# Patient Record
Sex: FEMALE | Race: BLACK OR AFRICAN AMERICAN | Employment: OTHER | URBAN - METROPOLITAN AREA
[De-identification: names, ages, dates, MRNs, and addresses within clinical notes are randomized per-mention and may not be internally consistent; named-entity substitution may affect disease eponyms.]

---

## 2017-03-01 ENCOUNTER — TELEPHONE (OUTPATIENT)
Dept: INTERNAL MEDICINE CLINIC | Age: 67
End: 2017-03-01

## 2017-03-01 DIAGNOSIS — I10 ESSENTIAL HYPERTENSION, BENIGN: Primary | ICD-10-CM

## 2017-03-01 DIAGNOSIS — E78.5 HYPERLIPIDEMIA, UNSPECIFIED HYPERLIPIDEMIA TYPE: ICD-10-CM

## 2017-03-01 NOTE — TELEPHONE ENCOUNTER
Pt is requesting a Lab slip for labs to be done next door   please call pt when ready for pick      # [phone]

## 2017-03-07 LAB
ALBUMIN SERPL-MCNC: 4.1 G/DL (ref 3.6–4.8)
ALBUMIN/GLOB SERPL: 1.2 {RATIO} (ref 1.1–2.5)
ALP SERPL-CCNC: 88 IU/L (ref 39–117)
ALT SERPL-CCNC: 17 IU/L (ref 0–32)
AST SERPL-CCNC: 20 IU/L (ref 0–40)
BASOPHILS # BLD AUTO: 0 X10E3/UL (ref 0–0.2)
BASOPHILS NFR BLD AUTO: 0 %
BILIRUB SERPL-MCNC: 0.6 MG/DL (ref 0–1.2)
BUN SERPL-MCNC: 20 MG/DL (ref 8–27)
BUN/CREAT SERPL: 20 (ref 11–26)
CALCIUM SERPL-MCNC: 9 MG/DL (ref 8.7–10.3)
CHLORIDE SERPL-SCNC: 100 MMOL/L (ref 96–106)
CHOLEST SERPL-MCNC: 170 MG/DL (ref 100–199)
CO2 SERPL-SCNC: 23 MMOL/L (ref 18–29)
CREAT SERPL-MCNC: 1 MG/DL (ref 0.57–1)
EOSINOPHIL # BLD AUTO: 0.1 X10E3/UL (ref 0–0.4)
EOSINOPHIL NFR BLD AUTO: 2 %
ERYTHROCYTE [DISTWIDTH] IN BLOOD BY AUTOMATED COUNT: 13.8 % (ref 12.3–15.4)
GLOBULIN SER CALC-MCNC: 3.5 G/DL (ref 1.5–4.5)
GLUCOSE SERPL-MCNC: 86 MG/DL (ref 65–99)
HCT VFR BLD AUTO: 37.7 % (ref 34–46.6)
HDLC SERPL-MCNC: 45 MG/DL
HGB BLD-MCNC: 12.4 G/DL (ref 11.1–15.9)
IMM GRANULOCYTES # BLD: 0 X10E3/UL (ref 0–0.1)
IMM GRANULOCYTES NFR BLD: 1 %
INTERPRETATION, 910389: NORMAL
INTERPRETATION: NORMAL
LDLC SERPL CALC-MCNC: 108 MG/DL (ref 0–99)
LYMPHOCYTES # BLD AUTO: 2.9 X10E3/UL (ref 0.7–3.1)
LYMPHOCYTES NFR BLD AUTO: 45 %
MCH RBC QN AUTO: 29.2 PG (ref 26.6–33)
MCHC RBC AUTO-ENTMCNC: 32.9 G/DL (ref 31.5–35.7)
MCV RBC AUTO: 89 FL (ref 79–97)
MONOCYTES # BLD AUTO: 0.5 X10E3/UL (ref 0.1–0.9)
MONOCYTES NFR BLD AUTO: 8 %
NEUTROPHILS # BLD AUTO: 2.8 X10E3/UL (ref 1.4–7)
NEUTROPHILS NFR BLD AUTO: 44 %
PDF IMAGE, 910387: NORMAL
PLATELET # BLD AUTO: 270 X10E3/UL (ref 150–379)
POTASSIUM SERPL-SCNC: 4.2 MMOL/L (ref 3.5–5.2)
PROT SERPL-MCNC: 7.6 G/DL (ref 6–8.5)
RBC # BLD AUTO: 4.24 X10E6/UL (ref 3.77–5.28)
SODIUM SERPL-SCNC: 140 MMOL/L (ref 134–144)
TRIGL SERPL-MCNC: 86 MG/DL (ref 0–149)
VLDLC SERPL CALC-MCNC: 17 MG/DL (ref 5–40)
WBC # BLD AUTO: 6.4 X10E3/UL (ref 3.4–10.8)

## 2017-03-09 NOTE — TELEPHONE ENCOUNTER
Patient called back, Writer informed patient Per Dr Tasneem Pastrana advise a higher dose of atorvastatin, needs a follow up appointment to discuss.  Patient understood and has appointment for March 10th with Dr Tasneem Pastrana

## 2017-03-09 NOTE — TELEPHONE ENCOUNTER
Advise a higher dose of atorvastatin    She needs to make a follow up appointment with me to review this

## 2017-03-10 ENCOUNTER — OFFICE VISIT (OUTPATIENT)
Dept: INTERNAL MEDICINE CLINIC | Age: 67
End: 2017-03-10

## 2017-03-10 VITALS
SYSTOLIC BLOOD PRESSURE: 140 MMHG | BODY MASS INDEX: 31.28 KG/M2 | DIASTOLIC BLOOD PRESSURE: 88 MMHG | WEIGHT: 170 LBS | HEART RATE: 86 BPM | RESPIRATION RATE: 16 BRPM | HEIGHT: 62 IN

## 2017-03-10 DIAGNOSIS — Z12.39 BREAST CANCER SCREENING: ICD-10-CM

## 2017-03-10 DIAGNOSIS — I10 ESSENTIAL HYPERTENSION WITH GOAL BLOOD PRESSURE LESS THAN 130/80: Primary | ICD-10-CM

## 2017-03-10 DIAGNOSIS — I10 ESSENTIAL HYPERTENSION: ICD-10-CM

## 2017-03-10 DIAGNOSIS — Z12.11 COLON CANCER SCREENING: ICD-10-CM

## 2017-03-10 DIAGNOSIS — I63.00 CEREBRAL INFARCTION DUE TO THROMBOSIS OF PRECEREBRAL ARTERY (HCC): ICD-10-CM

## 2017-03-10 DIAGNOSIS — E78.5 DYSLIPIDEMIA, GOAL LDL BELOW 100: ICD-10-CM

## 2017-03-10 RX ORDER — SPIRONOLACTONE 25 MG/1
TABLET ORAL
Qty: 60 TAB | Refills: 5 | Status: SHIPPED | OUTPATIENT
Start: 2017-03-10 | End: 2017-10-17 | Stop reason: SDUPTHER

## 2017-03-10 RX ORDER — AMLODIPINE BESYLATE 10 MG/1
10 TABLET ORAL DAILY
Qty: 30 TAB | Refills: 5 | Status: SHIPPED | OUTPATIENT
Start: 2017-03-10 | End: 2017-10-02 | Stop reason: SDUPTHER

## 2017-03-10 RX ORDER — ATORVASTATIN CALCIUM 20 MG/1
20 TABLET, FILM COATED ORAL DAILY
Qty: 30 TAB | Refills: 11 | Status: SHIPPED | OUTPATIENT
Start: 2017-03-10 | End: 2018-02-21 | Stop reason: SDUPTHER

## 2017-03-10 NOTE — MR AVS SNAPSHOT
Visit Information Date & Time Provider Department Dept. Phone Encounter #  
 3/10/2017  8:30 AM Margot Bahena MD [facility] [phone] Upcoming Health Maintenance Date Due Hepatitis C Screening 1950 ZOSTER VACCINE AGE 60> 5/29/2011 BREAST CANCER SCRN MAMMOGRAM 5/12/2016 GLAUCOMA SCREENING Q2Y 5/29/2016 OSTEOPOROSIS SCREENING (DEXA) 5/29/2016 Pneumococcal 65+ Low/Medium Risk (1 of 2 - PCV13) 5/29/2016 MEDICARE YEARLY EXAM 5/29/2016 INFLUENZA AGE 9 TO ADULT 8/1/2016 COLONOSCOPY 4/28/2020 DTaP/Tdap/Td series (2 - Td) 5/21/2022 Allergies as of 3/10/2017  Review Complete On: 8/1/2016 By: Cheyenne Curry LPN Severity Noted Reaction Type Reactions Ace Inhibitors  06/15/2010    Cough Asa-acetaminophen-salicyl-caff  71/45/8983    Not Reported This Time Motrin [Ibuprofen]  06/15/2010    Hives, Swelling Current Immunizations  Reviewed on 3/10/2017 Name Date TDAP Vaccine 5/21/2012 Reviewed by Margot Bahena MD on 3/10/2017 at  9:02 AM  
You Were Diagnosed With   
  
 Codes Comments Cerebral infarction due to thrombosis of precerebral artery (HCC)    -  Primary ICD-10-CM: I63.00 ICD-9-CM: 433.91 Dyslipidemia, goal LDL below 100     ICD-10-CM: E78.5 ICD-9-CM: 272.4 Essential hypertension     ICD-10-CM: I10 
ICD-9-CM: 401.9 Essential hypertension with goal blood pressure less than 130/80     ICD-10-CM: I10 
ICD-9-CM: 401.9 Colon cancer screening     ICD-10-CM: Z12.11 ICD-9-CM: V76.51 Breast cancer screening     ICD-10-CM: Z12.39 
ICD-9-CM: V76.10 Vitals BP Pulse Resp Height(growth percentile) Weight(growth percentile) BMI  
 140/88 86 16 5' 2\" (1.575 m) 170 lb (77.1 kg) 31.09 kg/m2 OB Status Smoking Status Postmenopausal Never Smoker Vitals History BMI and BSA Data Body Mass Index Body Surface Area 31.09 kg/m 2 1.84 m 2 Preferred Pharmacy Pharmacy Name Phone [facility] [phone] Your Updated Medication List  
  
   
This list is accurate as of: 3/10/17  9:16 AM.  Always use your most recent med list. amLODIPine 10 mg tablet Commonly known as:  Ram Alexis Take 1 Tab by mouth daily. aspirin 81 mg chewable tablet Take 81 mg by mouth daily. atorvastatin 20 mg tablet Commonly known as:  LIPITOR Take 1 Tab by mouth daily. spironolactone 25 mg tablet Commonly known as:  ALDACTONE  
TAKE ONE TABLET BY MOUTH TWICE DAILY Prescriptions Sent to Pharmacy Refills  
 atorvastatin (LIPITOR) 20 mg tablet 11 Sig: Take 1 Tab by mouth daily. Class: Normal  
 Pharmacy: [facility] Ph #: [phone] Route: Oral  
 amLODIPine (NORVASC) 10 mg tablet 5 Sig: Take 1 Tab by mouth daily. Class: Normal  
 [facility] Ph #: [phone] Route: Oral  
 spironolactone (ALDACTONE) 25 mg tablet 5 Sig: TAKE ONE TABLET BY MOUTH TWICE DAILY Class: Normal  
 [facility] Ph #: [phone] To-Do List   
 03/10/2017 Imaging:  [facility] BI SCREENING INCL CAD Introducing [facility]! Rasta Edmond introduces OLSET patient portal. Now you can access parts of your medical record, email your doctor's office, and request medication refills online. 1. In your internet browser, go to [email or web address] 2. Click on the First Time User? Click Here link in the Sign In box. You will see the New Member Sign Up page. 3. Enter your OLSET Access Code exactly as it appears below. You will not need to use this code after youve completed the sign-up process.  If you do not sign up before the expiration date, you must request a new code. · Plurality Access Code: [email or web address] Expires: 6/8/2017  9:16 AM 
 
4. Enter the last four digits of your Social Security Number (xxxx) and Date of Birth (mm/dd/yyyy) as indicated and click Submit. You will be taken to the next sign-up page. 5. Create a Plurality ID. This will be your Plurality login ID and cannot be changed, so think of one that is secure and easy to remember. 6. Create a Plurality password. You can change your password at any time. 7. Enter your Password Reset Question and Answer. This can be used at a later time if you forget your password. 8. Enter your e-mail address. You will receive e-mail notification when new information is available in [address] 9. Click Sign Up. You can now view and download portions of your medical record. 10. Click the Download Summary menu link to download a portable copy of your medical information. If you have questions, please visit the Frequently Asked Questions section of the Plurality website. Remember, Plurality is NOT to be used for urgent needs. For medical emergencies, dial 911. Now available from your iPhone and Android! Please provide this summary of care documentation to your next provider. Your primary care clinician is listed as Kaci Mar. If you have any questions after today's visit, please call [phone].

## 2017-03-10 NOTE — PROGRESS NOTES
Chief Complaint   Patient presents with    Follow-up     Thyroid chnage discussion     Medication Evaluation     pt has been taking simvastatin instead of atorvasatin     Patient Active Problem List    Diagnosis    Cerebral infarction due to thrombosis of precerebral artery (HCC)    Dyslipidemia, goal LDL below 100    CVA (cerebral infarction)     2010   Right thalamus      Hypertension     Non compliant with follow up  Not checking home BP  Hypertension ROS: taking medications as instructed, no medication side effects noted, no TIA's, no chest pain on exertion, no dyspnea on exertion, no swelling of ankles, no palpitations, no muscle aches or pain. New concerns: weight better was off satin for 5 days when had labs  Wt Readings from Last 3 Encounters:   03/10/17 170 lb (77.1 kg)   08/01/16 187 lb (84.8 kg)   12/16/15 184 lb (83.5 kg)     Vitals:    03/10/17 0842   BP: 140/88   Pulse: 86   Resp: 16   Weight: 170 lb (77.1 kg)   Height: 5' 2\" (1.575 m)     BP Readings from Last 3 Encounters:   03/10/17 140/88   08/01/16 144/80   12/16/15 147/85       Lab Results   Component Value Date/Time    Cholesterol, total 170 03/06/2017 12:17 PM    HDL Cholesterol 45 03/06/2017 12:17 PM    LDL, calculated 108 03/06/2017 12:17 PM    VLDL, calculated 17 03/06/2017 12:17 PM    Triglyceride 86 03/06/2017 12:17 PM    CHOL/HDL Ratio 3.6 09/17/2010 10:59 AM     .   Assessment:good ldl control  Plan: lab results and schedule of future lab studies reviewed with patient  repeat labs ordered prior to next appointment  orders and follow up as documented in patient record  reviewed diet, exercise and weight control  recommended sodium restriction. DASH diet advised and information given for lifestyle adjustment  May be able to lower BP 10 points with this diet    . Jose Johnston was seen today for follow-up and medication evaluation.     Diagnoses and all orders for this visit:    Cerebral infarction due to thrombosis of precerebral artery (HCC)    Dyslipidemia, goal LDL below 100  -     atorvastatin (LIPITOR) 20 mg tablet; Take 1 Tab by mouth daily. Essential hypertension    Essential hypertension with goal blood pressure less than 130/80  -     amLODIPine (NORVASC) 10 mg tablet; Take 1 Tab by mouth daily. Other orders  -     spironolactone (ALDACTONE) 25 mg tablet; TAKE ONE TABLET BY MOUTH TWICE DAILY    Exercise program for specific area of concern is given with written instructions  Stable status on multiple high risk medications for multiple comorbidities, will not change any of the present treatment plans  See orders  Switch  Atorvastatin due to drug interaction      Advance Care Planning (ACP) Provider Conversation Snapshot    Date of ACP Conversation: 03/10/17  Persons included in Conversation:  patient  Length of ACP Conversation in minutes:  <16 minutes (Non-Billable)    Authorized Decision Maker (if patient is incapable of making informed decisions):    This person is:   Healthcare Agent/Medical Power of  under Advance Directive          For Patients with Decision Making Capacity:   Values/Goals: Exploration of values, goals, and preferences if recovery is not expected, even with continued medical treatment in the event of:  Imminent death  Severe, permanent brain injury    Conversation Outcomes / Follow-Up Plan:   Reviewed existing Advance Directive

## 2017-03-22 ENCOUNTER — HOSPITAL ENCOUNTER (OUTPATIENT)
Dept: MAMMOGRAPHY | Age: 67
Discharge: HOME | End: 2017-03-22
Attending: INTERNAL MEDICINE
Payer: MEDICARE

## 2017-03-22 DIAGNOSIS — Z12.39 BREAST CANCER SCREENING: ICD-10-CM

## 2017-03-22 PROCEDURE — 77067 SCR MAMMO BI INCL CAD: CPT

## 2017-07-28 ENCOUNTER — OFFICE VISIT (OUTPATIENT)
Dept: INTERNAL MEDICINE CLINIC | Age: 67
End: 2017-07-28

## 2017-07-28 VITALS
OXYGEN SATURATION: 96 % | RESPIRATION RATE: 15 BRPM | BODY MASS INDEX: 34.6 KG/M2 | SYSTOLIC BLOOD PRESSURE: 128 MMHG | DIASTOLIC BLOOD PRESSURE: 80 MMHG | WEIGHT: 188 LBS | TEMPERATURE: 98.1 F | HEIGHT: 62 IN | HEART RATE: 97 BPM

## 2017-07-28 DIAGNOSIS — I10 ESSENTIAL HYPERTENSION: Primary | ICD-10-CM

## 2017-07-28 DIAGNOSIS — Z86.73 HISTORY OF CVA (CEREBROVASCULAR ACCIDENT): ICD-10-CM

## 2017-07-28 DIAGNOSIS — E78.5 DYSLIPIDEMIA, GOAL LDL BELOW 100: ICD-10-CM

## 2017-07-28 NOTE — MR AVS SNAPSHOT
Visit Information Date & Time Provider Department Dept. Phone Encounter #  
 7/28/2017  4:00 PM Bharati Jacome MD [facility] [phone] [id number] Upcoming Health Maintenance Date Due Hepatitis C Screening 1950 ZOSTER VACCINE AGE 60> 3/29/2011 GLAUCOMA SCREENING Q2Y 5/29/2016 OSTEOPOROSIS SCREENING (DEXA) 5/29/2016 Pneumococcal 65+ Low/Medium Risk (1 of 2 - PCV13) 5/29/2016 MEDICARE YEARLY EXAM 5/29/2016 INFLUENZA AGE 9 TO ADULT 8/1/2017 BREAST CANCER SCRN MAMMOGRAM 3/22/2019 COLONOSCOPY 4/28/2020 DTaP/Tdap/Td series (2 - Td) 5/21/2022 Allergies as of 7/28/2017  Review Complete On: 8/1/2016 By: Miracle Sweet LPN Severity Noted Reaction Type Reactions Ace Inhibitors  06/15/2010    Cough Asa-acetaminophen-salicyl-caff  76/51/8113    Not Reported This Time Motrin [Ibuprofen]  06/15/2010    Hives, Swelling Current Immunizations  Reviewed on 3/10/2017 Name Date TDAP Vaccine 5/21/2012 Not reviewed this visit You Were Diagnosed With   
  
 Codes Comments Essential hypertension    -  Primary ICD-10-CM: I10 
ICD-9-CM: 401.9 Dyslipidemia, goal LDL below 100     ICD-10-CM: E78.5 ICD-9-CM: 272.4 History of CVA (cerebrovascular accident)     ICD-10-CM: Z86.73 
ICD-9-CM: V12.54 Vitals BP Pulse Temp Resp Height(growth percentile) Weight(growth percentile) 128/80 97 98.1 °F (36.7 °C) (Oral) 15 5' 2\" (1.575 m) 188 lb (85.3 kg) SpO2 BMI OB Status Smoking Status 96% 34.39 kg/m2 Postmenopausal Never Smoker Vitals History BMI and BSA Data Body Mass Index Body Surface Area  
 34.39 kg/m 2 1.93 m 2 Preferred Pharmacy Pharmacy Name Phone [facility] [phone] Your Updated Medication List  
  
   
This list is accurate as of: 7/28/17  4:30 PM.  Always use your most recent med list.  
 amLODIPine 10 mg tablet Commonly known as:  Haig [place] Take 1 Tab by mouth daily. aspirin 81 mg chewable tablet Take 81 mg by mouth daily. atorvastatin 20 mg tablet Commonly known as:  LIPITOR Take 1 Tab by mouth daily. pneumococcal 13 alan conj dip 0.5 mL Syrg injection Commonly known as:  PREVNAR-13  
0.5 mL by IntraMUSCular route once for 1 dose. spironolactone 25 mg tablet Commonly known as:  ALDACTONE  
TAKE ONE TABLET BY MOUTH TWICE DAILY Prescriptions Printed Refills  
 pneumococcal 13 alan conj dip (PREVNAR-13) 0.5 mL syrg injection 0 Si.5 mL by IntraMUSCular route once for 1 dose. Class: Print Route: IntraMUSCular Please provide this summary of care documentation to your next provider. Your primary care clinician is listed as Sophy Taylor. If you have any questions after today's visit, please call [phone].

## 2017-07-28 NOTE — PROGRESS NOTES
Coordination of Care Questions    1. Have you been to the ER, urgent care clinic since your last visit? No       Hospitalized since your last visit? No    2. Have you seen or consulted any other health care providers outside of the [facility] since your last visit? Include any pap smears or colon screening.  No

## 2017-07-29 NOTE — PROGRESS NOTES
Chief Complaint   Patient presents with    Hypertension    Ankle swelling     Ramírez Andujar is a 78 y.o. female with the following Problems and Medications. Patient Active Problem List   Diagnosis Code    Hypertension I10    CVA (cerebral infarction) I63.9    Dyslipidemia, goal LDL below 100 E78.5    Cerebral infarction due to thrombosis of precerebral artery (HCC) I63.00     Current Outpatient Prescriptions   Medication Sig Dispense Refill    atorvastatin (LIPITOR) 20 mg tablet Take 1 Tab by mouth daily. 30 Tab 11    amLODIPine (NORVASC) 10 mg tablet Take 1 Tab by mouth daily. 30 Tab 5    spironolactone (ALDACTONE) 25 mg tablet TAKE ONE TABLET BY MOUTH TWICE DAILY 60 Tab 5    aspirin 81 mg chewable tablet Take 81 mg by mouth daily.        Denies big issues weight gain  Less active  ankle edema better when feet elevated    Cardiovascular ROS: no chest pain or dyspnea on exertion  Neurological ROS: no TIA or stroke symptoms  General ROS: negative for - chills, fatigue, fever, malaise, night sweats or sleep disturbance  Endocrine ROS: negative for - hair pattern changes, hot flashes, malaise/lethargy, palpitations, polydipsia/polyuria, temperature intolerance or unexpected weight changes      Vitals:    07/28/17 1614 07/28/17 1624   BP: 143/88 128/80   Pulse: 97    Resp: 15    Temp: 98.1 °F (36.7 °C)    TempSrc: Oral    SpO2: 96%    Weight: 188 lb (85.3 kg)    Height: 5' 2\" (1.575 m)      no apparent distress  Wt Readings from Last 3 Encounters:   07/28/17 188 lb (85.3 kg)   03/10/17 170 lb (77.1 kg)   08/01/16 187 lb (84.8 kg)     Chest clear  Rhythm regular  No edema now  Lab Results   Component Value Date/Time    Cholesterol, total 170 03/06/2017 12:17 PM    HDL Cholesterol 45 03/06/2017 12:17 PM    LDL, calculated 108 03/06/2017 12:17 PM    VLDL, calculated 17 03/06/2017 12:17 PM    Triglyceride 86 03/06/2017 12:17 PM    CHOL/HDL Ratio 3.6 09/17/2010 10:59 AM       Lipid abnormality controlled on med dose statin    Hypertension controlled for age based on guidelines      Diagnoses and all orders for this visit:    1. Essential hypertension    2. Dyslipidemia, goal LDL below 100    3. History of CVA (cerebrovascular accident)    Other orders  -     pneumococcal 13 alan conj dip (PREVNAR-13) 0.5 mL syrg injection; 0.5 mL by IntraMUSCular route once for 1 dose.

## 2017-10-02 DIAGNOSIS — I10 ESSENTIAL HYPERTENSION WITH GOAL BLOOD PRESSURE LESS THAN 130/80: ICD-10-CM

## 2017-10-02 RX ORDER — AMLODIPINE BESYLATE 10 MG/1
10 TABLET ORAL DAILY
Qty: 30 TAB | Refills: 5 | Status: SHIPPED | OUTPATIENT
Start: 2017-10-02 | End: 2018-02-21 | Stop reason: SDUPTHER

## 2018-02-21 DIAGNOSIS — E78.5 DYSLIPIDEMIA, GOAL LDL BELOW 100: ICD-10-CM

## 2018-02-21 DIAGNOSIS — I10 ESSENTIAL HYPERTENSION WITH GOAL BLOOD PRESSURE LESS THAN 130/80: ICD-10-CM

## 2018-02-21 RX ORDER — SPIRONOLACTONE 25 MG/1
TABLET ORAL
Qty: 60 TAB | Refills: 2 | Status: SHIPPED | OUTPATIENT
Start: 2018-02-21 | End: 2018-06-11 | Stop reason: SDUPTHER

## 2018-02-21 RX ORDER — ATORVASTATIN CALCIUM 20 MG/1
20 TABLET, FILM COATED ORAL DAILY
Qty: 30 TAB | Refills: 11 | Status: SHIPPED | OUTPATIENT
Start: 2018-02-21 | End: 2019-03-08 | Stop reason: SDUPTHER

## 2018-02-21 RX ORDER — AMLODIPINE BESYLATE 10 MG/1
10 TABLET ORAL DAILY
Qty: 30 TAB | Refills: 5 | Status: SHIPPED | OUTPATIENT
Start: 2018-02-21 | End: 2018-05-17 | Stop reason: SDUPTHER

## 2018-02-21 NOTE — TELEPHONE ENCOUNTER
Patient is calling to refill medication amlodipine, atorvastatin and spironolactone.  Questions please call [phone] or [phone]

## 2018-05-17 ENCOUNTER — OFFICE VISIT (OUTPATIENT)
Dept: INTERNAL MEDICINE CLINIC | Age: 68
End: 2018-05-17

## 2018-05-17 VITALS
HEIGHT: 62 IN | HEART RATE: 77 BPM | OXYGEN SATURATION: 98 % | DIASTOLIC BLOOD PRESSURE: 75 MMHG | BODY MASS INDEX: 34.3 KG/M2 | SYSTOLIC BLOOD PRESSURE: 132 MMHG | WEIGHT: 186.4 LBS | TEMPERATURE: 97.9 F | RESPIRATION RATE: 18 BRPM

## 2018-05-17 DIAGNOSIS — H10.12 ACUTE ATOPIC CONJUNCTIVITIS OF LEFT EYE: ICD-10-CM

## 2018-05-17 DIAGNOSIS — Z00.00 MEDICARE ANNUAL WELLNESS VISIT, SUBSEQUENT: ICD-10-CM

## 2018-05-17 DIAGNOSIS — E78.5 DYSLIPIDEMIA, GOAL LDL BELOW 100: ICD-10-CM

## 2018-05-17 DIAGNOSIS — M17.11 PRIMARY OSTEOARTHRITIS OF RIGHT KNEE: ICD-10-CM

## 2018-05-17 DIAGNOSIS — I10 ESSENTIAL HYPERTENSION: Primary | ICD-10-CM

## 2018-05-17 DIAGNOSIS — I10 ESSENTIAL HYPERTENSION WITH GOAL BLOOD PRESSURE LESS THAN 130/80: ICD-10-CM

## 2018-05-17 PROBLEM — M17.10 PRIMARY OSTEOARTHRITIS OF KNEE: Status: ACTIVE | Noted: 2018-05-17

## 2018-05-17 RX ORDER — AMLODIPINE BESYLATE 10 MG/1
10 TABLET ORAL DAILY
Qty: 30 TAB | Refills: 5 | Status: SHIPPED | OUTPATIENT
Start: 2018-05-17 | End: 2019-05-28 | Stop reason: SDUPTHER

## 2018-05-17 NOTE — PROGRESS NOTES
Johnathan Mead is a 78 y.o. female    Chief Complaint   Patient presents with    Hypertension     follow up    Cholesterol Problem     follow up    Ankle swelling     for last 2 weeks in right ankle swells when sitting goes away when gets up and walks around    Knee Pain     right knee     1. Have you been to the ER, urgent care clinic since your last visit? Hospitalized since your last visit? Went to [facility]. for pink eye 3 weeks ago    2. Have you seen or consulted any other health care providers outside of the [address] [place] [address] since your last visit? Include any pap smears or colon screening.  no    Visit Vitals    /70 (BP 1 Location: Right arm, BP Patient Position: Sitting)    Pulse 77    Temp 97.9 °F (36.6 °C) (Oral)    Resp 18    Ht 5' 2\" (1.575 m)    Wt 186 lb 6.4 oz (84.6 kg)    SpO2 98%    BMI 34.09 kg/m2

## 2018-05-17 NOTE — PROGRESS NOTES
Chief Complaint   Patient presents with    Hypertension     follow up    Cholesterol Problem     follow up    Ankle swelling     for last 2 weeks in right ankle swells when sitting goes away when gets up and walks around    Knee Pain     right knee     SUBJECTIVE: Shaun Yan is a 78 y.o. female seen for a follow up visit; she has hypertension, hyperlipidemia and history of prior stroke. Current Outpatient Prescriptions   Medication Sig Dispense Refill    amLODIPine (NORVASC) 10 mg tablet Take 1 Tab by mouth daily. 30 Tab 5    atorvastatin (LIPITOR) 20 mg tablet Take 1 Tab by mouth daily. 30 Tab 11    spironolactone (ALDACTONE) 25 mg tablet TAKE ONE TABLET BY MOUTH TWICE DAILY 60 Tab 2    aspirin 81 mg chewable tablet Take 81 mg by mouth daily. Patient Active Problem List   Diagnosis Code    Hypertension I10    CVA (cerebral infarction) I63.9    Dyslipidemia, goal LDL below 100 E78.5    Cerebral infarction due to thrombosis of precerebral artery (HCC) I63.00    Primary osteoarthritis of knee M17.10     System Review: Cardiovascular ROS - taking medications as instructed, no medication side effects noted, no TIA's, no chest pain on exertion, no dyspnea on exertion, no swelling of ankles, CNS ROS - no TIA or stroke-like symptoms, no amaurosis, diplopia, abnormal speech, unilateral numbness or weakness. New concerns: knee pain unilateral.     OBJECTIVE:  Visit Vitals    /75    Pulse 77    Temp 97.9 °F (36.6 °C) (Oral)    Resp 18    Ht 5' 2\" (1.575 m)    Wt 186 lb 6.4 oz (84.6 kg)    SpO2 98%    BMI 34.09 kg/m2      Appearance: alert, well appearing, and in no distress and oriented to person, place, and time. General exam: CVS exam BP noted to be well controlled today in office, S1, S2 normal, no gallop, no murmur, chest clear, no JVD, no HSM, no edema. Knee rom reduced  Lab review: orders written for new lab studies as appropriate; see orders.      ASSESSMENT:  hypertension stable, hyperlipidemia stable, cerebrovascular disease stable, continue asa. Knee DJD likely use tylenol and aspercream    PLAN:  current treatment plan is effective, no change in therapy. This is the Subsequent Medicare Annual Wellness Exam, performed 12 months or more after the Initial AWV or the last Subsequent AWV    I have reviewed the patient's medical history in detail and updated the computerized patient record. History     Past Medical History:   Diagnosis Date    Hypertension 6/15/2010      Past Surgical History:   Procedure Laterality Date    HX BREAST BIOPSY  1990's    neg     Current Outpatient Prescriptions   Medication Sig Dispense Refill    amLODIPine (NORVASC) 10 mg tablet Take 1 Tab by mouth daily. 30 Tab 5    atorvastatin (LIPITOR) 20 mg tablet Take 1 Tab by mouth daily. 30 Tab 11    spironolactone (ALDACTONE) 25 mg tablet TAKE ONE TABLET BY MOUTH TWICE DAILY 60 Tab 2    aspirin 81 mg chewable tablet Take 81 mg by mouth daily.        Allergies   Allergen Reactions    Ace Inhibitors Cough    Asa-Acetaminophen-Salicyl-Caff Not Reported This Time    Motrin [Ibuprofen] Hives and Swelling     Family History   Problem Relation Age of Onset    Heart Disease Mother     Diabetes Mother     Hypertension Mother     Breast Cancer Maternal Aunt      Social History   Substance Use Topics    Smoking status: Never Smoker    Smokeless tobacco: Never Used    Alcohol use No     Patient Active Problem List   Diagnosis Code    Hypertension I10    CVA (cerebral infarction) I63.9    Dyslipidemia, goal LDL below 100 E78.5    Cerebral infarction due to thrombosis of precerebral artery (HCC) I63.00    Primary osteoarthritis of knee M17.10       Depression Risk Factor Screening:     PHQ over the last two weeks 7/28/2017   Little interest or pleasure in doing things Not at all   Feeling down, depressed or hopeless Not at all   Total Score PHQ 2 0     Alcohol Risk Factor Screening: Functional Ability and Level of Safety:   Hearing Loss  Hearing is good. Activities of Daily Living  The home contains: no safety equipment. Patient does total self care    Fall Risk  Fall Risk Assessment, last 12 mths 7/28/2017   Able to walk? Yes   Fall in past 12 months? No       Abuse Screen  Patient is not abused    Cognitive Screening   Evaluation of Cognitive Function:  Has your family/caregiver stated any concerns about your memory: no  Normal    Patient Care Team   Patient Care Team:  Rojelio Ch MD as PCP - General    Assessment/Plan   Education and counseling provided:  Are appropriate based on today's review and evaluation    Diagnoses and all orders for this visit:    1. Essential hypertension  -     CBC WITH AUTOMATED DIFF  -     LIPID PANEL  -     METABOLIC PANEL, COMPREHENSIVE    2. Dyslipidemia, goal LDL below 100  -     CBC WITH AUTOMATED DIFF  -     LIPID PANEL  -     METABOLIC PANEL, COMPREHENSIVE    3. Acute atopic conjunctivitis of left eye    4. Primary osteoarthritis of right knee  -     XR KNEE RT MAX 2 VWS; Future    5. Essential hypertension with goal blood pressure less than 130/80  -     amLODIPine (NORVASC) 10 mg tablet; Take 1 Tab by mouth daily. 6. Medicare annual wellness visit, subsequent        Health Maintenance Due   Topic Date Due    Hepatitis C Screening  1950    ZOSTER VACCINE AGE 60>  03/29/2011    GLAUCOMA SCREENING Q2Y  05/29/2016    Bone Densitometry (Dexa) Screening  05/29/2016    Pneumococcal 65+ Low/Medium Risk (1 of 2 - PCV13) 05/29/2016    MEDICARE YEARLY EXAM  03/28/2018     I have recommended that this patient have a flu shot and any vaccine but she declines at this time. I have discussed the risks and benefits of this examination with her. The patient verbalizes understanding.

## 2018-05-17 NOTE — PATIENT INSTRUCTIONS
Medicare Wellness Visit, Female    The best way to live healthy is to have a lifestyle where you eat a well-balanced diet, exercise regularly, limit alcohol use, and quit all forms of tobacco/nicotine, if applicable. Regular preventive services are another way to keep healthy. Preventive services (vaccines, screening tests, monitoring & exams) can help personalize your care plan, which helps you manage your own care. Screening tests can find health problems at the earliest stages, when they are easiest to treat. [address] [facility] follows the current, evidence-based guidelines published by the [facility] Cristiano Stuart ([facility]) when recommending preventive services for our patients. Because we follow these guidelines, sometimes recommendations change over time as research supports it. (For example, mammograms used to be recommended annually. Even though Medicare will still pay for an annual mammogram, the newer guidelines recommend a mammogram every two years for women of average risk.)    Of course, you and your provider may decide to screen more often for some diseases, based on your risk and co-morbidities (chronic disease you are already diagnosed with). Preventive services for you include:    - Medicare offers their members a free annual wellness visit, which is time for you and your primary care provider to discuss and plan for your preventive service needs. Take advantage of this benefit every year!    -All people over age 72 should receive the recommended pneumonia vaccines. Current USPSTF guidelines recommend a series of two vaccines for the best pneumonia protection.     -All adults should have a yearly flu vaccine and a tetanus vaccine every 10 years. All adults age 61 years should receive a shingles vaccine once in their lifetime.      -A bone mass density test is recommended when a woman turns 65 to screen for osteoporosis.  This test is only recommended once as a screening. Some providers will use this same test as a disease monitoring tool if you already have osteoporosis. -All adults age 38-68 years who are overweight should have a diabetes screening test once every three years.     -Other screening tests & preventive services for persons with diabetes include: an eye exam to screen for diabetic retinopathy, a kidney function test, a foot exam, and stricter control over your cholesterol.     -Cardiovascular screening for adults with routine risk involves an electrocardiogram (ECG) at intervals determined by the provider.     -Colorectal cancer screenings should be done for adults age 54-65 years with normal risk. There are a number of acceptable methods of screening for this type of cancer. Each test has its own benefits and drawbacks. Discuss with your provider what is most appropriate for you during your annual wellness visit. The different tests include: colonoscopy (considered the best screening method), a fecal occult blood test, a fecal DNA test, and sigmoidoscopy. -Breast cancer screenings are recommended every other year for women of normal risk age 54-69 years.     -Cervical cancer screenings for women over age 72 are only recommended with certain risk factors.     -All adults born between [facility] should be screened once for Hepatitis C.      Here is a list of your current Health Maintenance items (your personalized list of preventive services) with a due date:  Health Maintenance Due   Topic Date Due    Hepatitis C Test  1950    Shingles Vaccine  03/29/2011    Glaucoma Screening   05/29/2016    Bone Mineral Density   05/29/2016    Pneumococcal Vaccine (1 of 2 - PCV13) 05/29/2016    Annual Well Visit  03/28/2018

## 2018-07-04 LAB
ALBUMIN SERPL-MCNC: 3.9 G/DL (ref 3.6–4.8)
ALBUMIN/GLOB SERPL: 1.3 {RATIO} (ref 1.2–2.2)
ALP SERPL-CCNC: 97 IU/L (ref 39–117)
ALT SERPL-CCNC: 16 IU/L (ref 0–32)
AST SERPL-CCNC: 14 IU/L (ref 0–40)
BASOPHILS # BLD AUTO: 0 X10E3/UL (ref 0–0.2)
BASOPHILS NFR BLD AUTO: 0 %
BILIRUB SERPL-MCNC: 0.4 MG/DL (ref 0–1.2)
BUN SERPL-MCNC: 19 MG/DL (ref 8–27)
BUN/CREAT SERPL: 17 (ref 12–28)
CALCIUM SERPL-MCNC: 9.3 MG/DL (ref 8.7–10.3)
CHLORIDE SERPL-SCNC: 101 MMOL/L (ref 96–106)
CHOLEST SERPL-MCNC: 144 MG/DL (ref 100–199)
CO2 SERPL-SCNC: 25 MMOL/L (ref 20–29)
CREAT SERPL-MCNC: 1.13 MG/DL (ref 0.57–1)
EOSINOPHIL # BLD AUTO: 0.1 X10E3/UL (ref 0–0.4)
EOSINOPHIL NFR BLD AUTO: 2 %
ERYTHROCYTE [DISTWIDTH] IN BLOOD BY AUTOMATED COUNT: 13.5 % (ref 12.3–15.4)
GLOBULIN SER CALC-MCNC: 3 G/DL (ref 1.5–4.5)
GLUCOSE SERPL-MCNC: 88 MG/DL (ref 65–99)
HCT VFR BLD AUTO: 38.4 % (ref 34–46.6)
HDLC SERPL-MCNC: 47 MG/DL
HGB BLD-MCNC: 12.4 G/DL (ref 11.1–15.9)
IMM GRANULOCYTES # BLD: 0 X10E3/UL (ref 0–0.1)
IMM GRANULOCYTES NFR BLD: 0 %
INTERPRETATION, 910389: NORMAL
INTERPRETATION: NORMAL
LDLC SERPL CALC-MCNC: 82 MG/DL (ref 0–99)
LYMPHOCYTES # BLD AUTO: 2.6 X10E3/UL (ref 0.7–3.1)
LYMPHOCYTES NFR BLD AUTO: 36 %
MCH RBC QN AUTO: 29.5 PG (ref 26.6–33)
MCHC RBC AUTO-ENTMCNC: 32.3 G/DL (ref 31.5–35.7)
MCV RBC AUTO: 91 FL (ref 79–97)
MONOCYTES # BLD AUTO: 0.7 X10E3/UL (ref 0.1–0.9)
MONOCYTES NFR BLD AUTO: 10 %
NEUTROPHILS # BLD AUTO: 3.9 X10E3/UL (ref 1.4–7)
NEUTROPHILS NFR BLD AUTO: 52 %
PDF IMAGE, 910387: NORMAL
PLATELET # BLD AUTO: 285 X10E3/UL (ref 150–379)
POTASSIUM SERPL-SCNC: 4.7 MMOL/L (ref 3.5–5.2)
PROT SERPL-MCNC: 6.9 G/DL (ref 6–8.5)
RBC # BLD AUTO: 4.21 X10E6/UL (ref 3.77–5.28)
SODIUM SERPL-SCNC: 139 MMOL/L (ref 134–144)
TRIGL SERPL-MCNC: 77 MG/DL (ref 0–149)
VLDLC SERPL CALC-MCNC: 15 MG/DL (ref 5–40)
WBC # BLD AUTO: 7.3 X10E3/UL (ref 3.4–10.8)

## 2018-09-04 ENCOUNTER — TELEPHONE (OUTPATIENT)
Dept: INTERNAL MEDICINE CLINIC | Age: 68
End: 2018-09-04

## 2018-09-04 DIAGNOSIS — M17.0 PRIMARY OSTEOARTHRITIS OF BOTH KNEES: Primary | ICD-10-CM

## 2018-09-04 NOTE — TELEPHONE ENCOUNTER
Spoke with patient. Advised per Dr Dipak Carlson that ordered plain xray not an MRI of her knee   added an order for both knee xray.  Patient verbalized understanding

## 2018-09-04 NOTE — TELEPHONE ENCOUNTER
Patient is requesting to add the left leg to the MRI order,since both legs are in pain. She is waiting

## 2018-09-05 ENCOUNTER — HOSPITAL ENCOUNTER (OUTPATIENT)
Dept: GENERAL RADIOLOGY | Age: 68
Discharge: HOME | End: 2018-09-05
Attending: INTERNAL MEDICINE
Payer: MEDICARE

## 2018-09-05 DIAGNOSIS — M17.0 PRIMARY OSTEOARTHRITIS OF BOTH KNEES: ICD-10-CM

## 2018-09-05 PROCEDURE — 73565 X-RAY EXAM OF KNEES: CPT

## 2018-09-10 ENCOUNTER — TELEPHONE (OUTPATIENT)
Dept: INTERNAL MEDICINE CLINIC | Age: 68
End: 2018-09-10

## 2018-09-10 NOTE — PROGRESS NOTES
Called pt and gave her the results. She expressed understanding and had no further questions. Gave her Dr Martita Arriola's name/number as well.

## 2018-09-10 NOTE — TELEPHONE ENCOUNTER
[id number]  Dr Gladys Estrada office need a copy of xray she did not know if it could be picked or if it was another way to get it to the office. So need by 9/11/2018 1:30pm appt.

## 2018-09-10 NOTE — TELEPHONE ENCOUNTER
Called pt back. Dr Natalie Gallegos has priviledges at [facility] and performs surgeries in their operating rooms. He should be able to access these records. If there are any issues she will reach out to the  [facility] she went to as we have no way of downloading her xrays or making copies of film for her.

## 2019-03-08 DIAGNOSIS — E78.5 DYSLIPIDEMIA, GOAL LDL BELOW 100: ICD-10-CM

## 2019-03-08 RX ORDER — ATORVASTATIN CALCIUM 20 MG/1
TABLET, FILM COATED ORAL
Qty: 30 TAB | Refills: 0 | Status: SHIPPED | OUTPATIENT
Start: 2019-03-08 | End: 2019-05-28 | Stop reason: SDUPTHER

## 2019-05-28 DIAGNOSIS — I10 ESSENTIAL HYPERTENSION WITH GOAL BLOOD PRESSURE LESS THAN 130/80: ICD-10-CM

## 2019-05-28 DIAGNOSIS — E78.5 DYSLIPIDEMIA, GOAL LDL BELOW 100: ICD-10-CM

## 2019-05-28 NOTE — TELEPHONE ENCOUNTER
Pt requesting refills of \"Atorvastatin 20mg\" and \"Amlodipine 10mg\"   Pt stated she only has 4 of her \"Amlodipine 10mg\" left and completely out of \"Atorvastatin\"   Pt stated she is out of refills   PT rescheduled appt set for 05/30 as she had a recent family passing. Appt rescheduled. Monday, Lisa 10, 2019 04:30   PTs pharmacy is [facility] on file.       [place]

## 2019-05-29 DIAGNOSIS — I10 ESSENTIAL HYPERTENSION WITH GOAL BLOOD PRESSURE LESS THAN 130/80: ICD-10-CM

## 2019-05-29 DIAGNOSIS — E78.5 DYSLIPIDEMIA, GOAL LDL BELOW 100: ICD-10-CM

## 2019-05-29 RX ORDER — AMLODIPINE BESYLATE 10 MG/1
10 TABLET ORAL DAILY
Qty: 30 TAB | Refills: 0 | Status: SHIPPED | OUTPATIENT
Start: 2019-05-29 | End: 2019-05-29 | Stop reason: SDUPTHER

## 2019-05-29 RX ORDER — AMLODIPINE BESYLATE 10 MG/1
TABLET ORAL
Qty: 30 TAB | Refills: 0 | Status: SHIPPED | OUTPATIENT
Start: 2019-05-29 | End: 2019-06-10 | Stop reason: SDUPTHER

## 2019-05-29 RX ORDER — ATORVASTATIN CALCIUM 20 MG/1
TABLET, FILM COATED ORAL
Qty: 30 TAB | Refills: 0 | Status: SHIPPED | OUTPATIENT
Start: 2019-05-29 | End: 2019-06-10 | Stop reason: SDUPTHER

## 2019-05-29 RX ORDER — ATORVASTATIN CALCIUM 20 MG/1
TABLET, FILM COATED ORAL
Qty: 30 TAB | Refills: 0 | Status: SHIPPED | OUTPATIENT
Start: 2019-05-29 | End: 2019-05-29 | Stop reason: SDUPTHER

## 2019-06-10 ENCOUNTER — OFFICE VISIT (OUTPATIENT)
Dept: INTERNAL MEDICINE CLINIC | Age: 69
End: 2019-06-10

## 2019-06-10 VITALS
BODY MASS INDEX: 34.04 KG/M2 | HEART RATE: 73 BPM | DIASTOLIC BLOOD PRESSURE: 78 MMHG | HEIGHT: 62 IN | RESPIRATION RATE: 18 BRPM | TEMPERATURE: 97 F | SYSTOLIC BLOOD PRESSURE: 120 MMHG | OXYGEN SATURATION: 99 % | WEIGHT: 185 LBS

## 2019-06-10 DIAGNOSIS — I10 ESSENTIAL HYPERTENSION WITH GOAL BLOOD PRESSURE LESS THAN 130/80: ICD-10-CM

## 2019-06-10 DIAGNOSIS — Z86.73 HISTORY OF CVA (CEREBROVASCULAR ACCIDENT): ICD-10-CM

## 2019-06-10 DIAGNOSIS — E78.5 DYSLIPIDEMIA, GOAL LDL BELOW 100: ICD-10-CM

## 2019-06-10 DIAGNOSIS — I10 ESSENTIAL HYPERTENSION: Primary | ICD-10-CM

## 2019-06-10 DIAGNOSIS — M17.0 PRIMARY OSTEOARTHRITIS OF BOTH KNEES: ICD-10-CM

## 2019-06-10 DIAGNOSIS — I73.9 PAD (PERIPHERAL ARTERY DISEASE) (HCC): ICD-10-CM

## 2019-06-10 RX ORDER — ATORVASTATIN CALCIUM 20 MG/1
20 TABLET, FILM COATED ORAL DAILY
Qty: 30 TAB | Refills: 5 | Status: SHIPPED | OUTPATIENT
Start: 2019-06-10 | End: 2020-01-09 | Stop reason: SDUPTHER

## 2019-06-10 RX ORDER — SPIRONOLACTONE 25 MG/1
TABLET ORAL
Qty: 60 TAB | Refills: 5 | Status: SHIPPED | OUTPATIENT
Start: 2019-06-10 | End: 2020-01-07 | Stop reason: SDUPTHER

## 2019-06-10 RX ORDER — AMLODIPINE BESYLATE 10 MG/1
TABLET ORAL
Qty: 30 TAB | Refills: 5 | Status: SHIPPED | OUTPATIENT
Start: 2019-06-10 | End: 2020-01-07 | Stop reason: SDUPTHER

## 2019-06-10 NOTE — PROGRESS NOTES
1. Have you been to the ER, urgent care clinic since your last visit? Hospitalized since your last visit?no    2. Have you seen or consulted any other health care providers outside of the [address] [place] [address] since your last visit? Include any pap smears or colon screening.  Vascular-dr MCKINNEY

## 2019-06-10 NOTE — PROGRESS NOTES
Chief Complaint   Patient presents with    Hypertension    Cerebrovascular Accident     hx    Cholesterol Problem    Arthritis     Chief Complaint   Patient presents with    Hypertension    Cerebrovascular Accident     hx    Cholesterol Problem    Arthritis     SUBJECTIVE: Sam Moran is a 77 y.o. female seen for a follow up visit; she has hypertension, hyperlipidemia and history of prior stroke. Current Outpatient Medications   Medication Sig Dispense Refill    spironolactone (ALDACTONE) 25 mg tablet TAKE ONE TABLET BY MOUTH TWICE DAILY 60 Tab 5    amLODIPine (NORVASC) 10 mg tablet TAKE ONE TABLET BY MOUTH EVERY DAY 30 Tab 5    atorvastatin (LIPITOR) 20 mg tablet Take 1 Tab by mouth daily. 30 Tab 5    aspirin 81 mg chewable tablet Take 81 mg by mouth daily. Patient Active Problem List   Diagnosis Code    Hypertension I10    CVA (cerebral infarction) I63.9    Dyslipidemia, goal LDL below 100 E78.5    Cerebral infarction due to thrombosis of precerebral artery ([facility]) I63.00    Primary osteoarthritis of knee M17.10    PAD (peripheral artery disease) ([facility]) I73.9    Primary osteoarthritis of both knees M17.0     System Review: Cardiovascular ROS - taking medications as instructed, no medication side effects noted, no TIA's, no chest pain on exertion, no dyspnea on exertion, no swelling of ankles, CNS ROS - no TIA or stroke-like symptoms, no amaurosis, diplopia, abnormal speech, unilateral numbness or weakness. New concerns: knee pain unilateral. Told severe DJD has morning stiffness    OBJECTIVE:  Visit Vitals  /78   Pulse 73   Temp 97 °F (36.1 °C) (Oral)   Resp 18   Ht 5' 2\" (1.575 m)   Wt 185 lb (83.9 kg)   SpO2 99%   BMI 33.84 kg/m²      Appearance: alert, well appearing, and in no distress and oriented to person, place, and time.   General exam: CVS exam BP noted to be well controlled today in office, S1, S2 normal, no gallop, no murmur, chest clear, no JVD, no HSM, no edema.  Knee exam: bilateral positive for moderate crepitations, some mild tenderness and pain on range of motion, nolarge effusion is present, no pseudo laxity noted. Knee rom reduced    Gait normal  Non focal neyuro  Lab review: orders written for new lab studies as appropriate; see orders. ASSESSMENT:  hypertension stable, hyperlipidemia stable, cerebrovascular disease stable, continue asa. Knee DJD likely use tylenol and aspercream    PLAN:  current treatment plan is effective, no change in therapy. Diagnoses and all orders for this visit:    1. Essential hypertension  -     CBC WITH AUTOMATED DIFF  -     LIPID PANEL  -     METABOLIC PANEL, COMPREHENSIVE    2. PAD (peripheral artery disease) ([facility]    3. Primary osteoarthritis of both knees    4. Dyslipidemia, goal LDL below 100  -     atorvastatin (LIPITOR) 20 mg tablet; Take 1 Tab by mouth daily. 5. History of CVA (cerebrovascular accident)    6.  Essential hypertension with goal blood pressure less than 130/80  -     amLODIPine (NORVASC) 10 mg tablet; TAKE ONE TABLET BY MOUTH EVERY DAY    Other orders  -     spironolactone (ALDACTONE) 25 mg tablet; TAKE ONE TABLET BY MOUTH TWICE DAILY      High risk medications reviewed  Prolonged visit with 15 minutes of time out  of more than a  25 minute visit spent counseling patient and family and formulation of care

## 2019-11-13 LAB
CREATININE, EXTERNAL: 1.27
INR, EXTERNAL: 1
PT, EXTERNAL: 11.5

## 2019-11-14 ENCOUNTER — OFFICE VISIT (OUTPATIENT)
Dept: INTERNAL MEDICINE CLINIC | Age: 69
End: 2019-11-14

## 2019-11-14 VITALS
HEART RATE: 88 BPM | BODY MASS INDEX: 34.96 KG/M2 | HEIGHT: 62 IN | DIASTOLIC BLOOD PRESSURE: 75 MMHG | SYSTOLIC BLOOD PRESSURE: 126 MMHG | OXYGEN SATURATION: 99 % | TEMPERATURE: 97.3 F | RESPIRATION RATE: 18 BRPM | WEIGHT: 190 LBS

## 2019-11-14 DIAGNOSIS — H81.13 BENIGN POSITIONAL VERTIGO, BILATERAL: Primary | ICD-10-CM

## 2019-11-14 DIAGNOSIS — I10 ESSENTIAL HYPERTENSION: ICD-10-CM

## 2019-11-14 DIAGNOSIS — E78.5 DYSLIPIDEMIA, GOAL LDL BELOW 100: ICD-10-CM

## 2019-11-14 NOTE — PROGRESS NOTES
1. Have you been to the ER, urgent care clinic since your last visit? Hospitalized since your last visit? ER dizzy and vomiting @ [facility].  EKG was normal, MRI-normal    2. Have you seen or consulted any other health care providers outside of the [address] [place] [address] since your last visit? Include any pap smears or colon screening.  No

## 2019-11-14 NOTE — PROGRESS NOTES
Problem follow up: Demarco Stuart returns for follow up visit regarding er visit. she was seen 2 days ago in  er diagnosed with vertigo or  and treated with antinausea and meclizine. Workup was significant for neg ct lab and ekg. Notes, labs, studies, imaging related to this problem during prior visit were not available . Since that visit, she has significantly improved. she has been compliant with prescribed treatment. Residual symptoms include: none  New issues associated with this problem include: very weak from antinausea    Subjective:  Ms. Orlin Carr labs were reviewed from the hospital ER. Her creatinine was 1.27, total protein 8.8, white count 11.65. The rest of her lab work was normal.  Patient will need to have that all repeated. She never had the lab work done that I gave her in May. Patient Active Problem List    Diagnosis Date Noted    [facility]peripheral artery disease) [facility] 06/10/2019    Primary osteoarthritis of both knees 06/10/2019    Primary osteoarthritis of knee 05/17/2018    [facility] to [facility]cerebral [facility] 12/16/2015    Dyslipidemia, goal LDL below 100 04/28/2011    CVA (cerebral infarction) 07/09/2010    Hypertension 06/15/2010     Vitals:    11/14/19 1610   BP: 142/78   Pulse: 88   Resp: 18   Temp: 97.3 °F (36.3 °C)   TempSrc: Oral   SpO2: 99%   Weight: 190 lb (86.2 kg)   Height: 5' 2\" (1.575 m)     . Vitals:    11/14/19 1610 11/14/19 1633   BP: 142/78 126/75   Pulse: 88    Resp: 18    Temp: 97.3 °F (36.3 °C)    TempSrc: Oral    SpO2: 99%    Weight: 190 lb (86.2 kg)    Height: 5' 2\" (1.575 m)      aileen  S1 and S2 normal, no murmurs, clicks, gallops or rubs. Regular rate and rhythm. Chest is clear; no wheezes or rales. No edema or JVD. The abdomen is soft without tenderness, guarding, mass, rebound or organomegaly. Bowel sounds are normal. No CVA tenderness or inguinal adenopathy noted.       1. Benign positional vertigo, bilateral  Likely advise slow return normal  ./elevate HOB    2. Dyslipidemia, goal LDL below 100  labs  - LIPID PANEL; Future    3. Essential hypertension  Repeat labs  - CBC WITH AUTOMATED DIFF; Future  - LIPID PANEL; Future  - METABOLIC PANEL, COMPREHENSIVE;  Future

## 2020-01-07 DIAGNOSIS — I10 ESSENTIAL HYPERTENSION WITH GOAL BLOOD PRESSURE LESS THAN 130/80: ICD-10-CM

## 2020-01-08 RX ORDER — SPIRONOLACTONE 25 MG/1
TABLET ORAL
Qty: 60 TAB | Refills: 5 | Status: SHIPPED | OUTPATIENT
Start: 2020-01-08 | End: 2020-07-08 | Stop reason: SDUPTHER

## 2020-01-08 RX ORDER — AMLODIPINE BESYLATE 10 MG/1
TABLET ORAL
Qty: 30 TAB | Refills: 5 | Status: SHIPPED | OUTPATIENT
Start: 2020-01-08 | End: 2020-07-08 | Stop reason: SDUPTHER

## 2020-01-09 DIAGNOSIS — E78.5 DYSLIPIDEMIA, GOAL LDL BELOW 100: ICD-10-CM

## 2020-01-09 RX ORDER — ATORVASTATIN CALCIUM 20 MG/1
20 TABLET, FILM COATED ORAL DAILY
Qty: 30 TAB | Refills: 5 | Status: SHIPPED | OUTPATIENT
Start: 2020-01-09 | End: 2020-07-08 | Stop reason: SDUPTHER

## 2020-02-25 ENCOUNTER — TELEPHONE (OUTPATIENT)
Dept: PHARMACY | Age: 70
End: 2020-02-25

## 2020-02-25 NOTE — TELEPHONE ENCOUNTER
CLINICAL PHARMACY: STATIN THERAPY REVIEW    Identified care gap per [employer] statin use in persons with cardiovascular disease and adherence     Per [facility]: [phone]    Medication: Atorvastatin 20 mg tablet  Last 3 fill dates: 1/15/2020  Day supply: 90  Refills remainin  Directions: 1 tab po daily  Insurance billed: [facility]  Prescribing Provider: Savanna Dolan MD      No patient outreach planned at this time. Patient ha 90 day supply delivered and has 1 refill remaining. Last Appt: 20    Next Appt: [facility]  Department, toll free: [phone], option 7    CLINICAL PHARMACY CONSULT: MED RECONCILIATION/REVIEW ADDENDUM    For Pharmacy Admin Tracking Only    PHSO: PHSO Patient?: Yes  Total # of Interventions Recommended: Count: 1    - Maintenance Safety Lab Monitoring #: 1  Recommended intervention potential cost savings:   Accepted intervention potential cost savings:    Total Interventions Accepted: 1  Time Spent (min): [facility]

## 2020-07-08 ENCOUNTER — VIRTUAL VISIT (OUTPATIENT)
Dept: INTERNAL MEDICINE CLINIC | Age: 70
End: 2020-07-08

## 2020-07-08 DIAGNOSIS — E78.5 DYSLIPIDEMIA, GOAL LDL BELOW 100: ICD-10-CM

## 2020-07-08 DIAGNOSIS — I10 ESSENTIAL HYPERTENSION WITH GOAL BLOOD PRESSURE LESS THAN 130/80: ICD-10-CM

## 2020-07-08 DIAGNOSIS — Z12.11 COLON CANCER SCREENING: ICD-10-CM

## 2020-07-08 DIAGNOSIS — I73.9 PAD (PERIPHERAL ARTERY DISEASE) (HCC): ICD-10-CM

## 2020-07-08 DIAGNOSIS — Z78.0 POSTMENOPAUSAL STATE: ICD-10-CM

## 2020-07-08 DIAGNOSIS — Z12.31 ENCOUNTER FOR SCREENING MAMMOGRAM FOR MALIGNANT NEOPLASM OF BREAST: ICD-10-CM

## 2020-07-08 DIAGNOSIS — I10 ESSENTIAL HYPERTENSION: Primary | ICD-10-CM

## 2020-07-08 RX ORDER — SPIRONOLACTONE 25 MG/1
TABLET ORAL
Qty: 180 TAB | Refills: 1 | Status: SHIPPED | OUTPATIENT
Start: 2020-07-08 | End: 2020-11-12

## 2020-07-08 RX ORDER — ATORVASTATIN CALCIUM 20 MG/1
20 TABLET, FILM COATED ORAL DAILY
Qty: 90 TAB | Refills: 1 | Status: SHIPPED | OUTPATIENT
Start: 2020-07-08 | End: 2020-11-12

## 2020-07-08 RX ORDER — AMLODIPINE BESYLATE 10 MG/1
TABLET ORAL
Qty: 90 TAB | Refills: 1 | Status: SHIPPED | OUTPATIENT
Start: 2020-07-08 | End: 2020-11-12

## 2020-07-08 NOTE — PATIENT INSTRUCTIONS
Medicare Wellness Visit, Female The best way to live healthy is to have a lifestyle where you eat a well-balanced diet, exercise regularly, limit alcohol use, and quit all forms of tobacco/nicotine, if applicable. Regular preventive services are another way to keep healthy. Preventive services (vaccines, screening tests, monitoring & exams) can help personalize your care plan, which helps you manage your own care. Screening tests can find health problems at the earliest stages, when they are easiest to treat. [facility] follows the current, evidence-based guidelines published by the [facility] Cristiano Davidson ([facility]) when recommending preventive services for our patients. Because we follow these guidelines, sometimes recommendations change over time as research supports it. (For example, mammograms used to be recommended annually. Even though Medicare will still pay for an annual mammogram, the newer guidelines recommend a mammogram every two years for women of average risk). Of course, you and your doctor may decide to screen more often for some diseases, based on your risk and your co-morbidities (chronic disease you are already diagnosed with). Preventive services for you include: - Medicare offers their members a free annual wellness visit, which is time for you and your primary care provider to discuss and plan for your preventive service needs. Take advantage of this benefit every year! 
-All adults over the age of 72 should receive the recommended pneumonia vaccines. Current USPSTF guidelines recommend a series of two vaccines for the best pneumonia protection.  
-All adults should have a flu vaccine yearly and a tetanus vaccine every 10 years.  
-All adults age 48 and older should receive the shingles vaccines (series of two vaccines). -All adults age 38-68 who are overweight should have a diabetes screening test once every three years. -All adults born between [birth year] and 1964 should be screened once for Hepatitis C. 
-Other screening tests and preventive services for persons with diabetes include: an eye exam to screen for diabetic retinopathy, a kidney function test, a foot exam, and stricter control over your cholesterol.  
-Cardiovascular screening for adults with routine risk involves an electrocardiogram (ECG) at intervals determined by your doctor.  
-Colorectal cancer screenings should be done for adults age 54-65 with no increased risk factors for colorectal cancer. There are a number of acceptable methods of screening for this type of cancer. Each test has its own benefits and drawbacks. Discuss with your doctor what is most appropriate for you during your annual wellness visit. The different tests include: colonoscopy (considered the best screening method), a fecal occult blood test, a fecal DNA test, and sigmoidoscopy. 
 
-A bone mass density test is recommended when a woman turns 65 to screen for osteoporosis. This test is only recommended one time, as a screening. Some providers will use this same test as a disease monitoring tool if you already have osteoporosis. -Breast cancer screenings are recommended every other year for women of normal risk, age 54-69. 
-Cervical cancer screenings for women over age 72 are only recommended with certain risk factors. Here is a list of your current Health Maintenance items (your personalized list of preventive services) with a due date: 
Health Maintenance Due Topic Date Due  
 Hepatitis C Test  1950  Shingles Vaccine (1 of 2) 05/29/2001  Glaucoma Screening   05/29/2016  Bone Mineral Density   05/29/2016  Pneumococcal Vaccine (1 of 1 - PPSV23) 05/29/2016  Mammogram  03/22/2019 [facility] Annual Well Visit  05/18/2019  Cholesterol Test   07/03/2019  Colonoscopy  04/28/2020

## 2020-07-08 NOTE — PROGRESS NOTES
I was at home while conducting this encounter. Consent:  She and/or her healthcare decision maker is aware that this patient-initiated Telehealth encounter is a billable service, with coverage as determined by her insurance carrier. She is aware that she may receive a bill and has provided verbal consent to proceed: Yes    This virtual visit was conducted telephone encounter only. -  I affirm this is a Patient Initiated Episode with an Established Patient who has not had a related appointment within my department in the past 7 days or scheduled within the next 24 hours. Note: this encounter is not billable if this call serves to triage the patient into an appointment for the relevant concern. Total Time: 35 to 40 min    26 minutes on health issues and 15 on wellness  This is the Subsequent Medicare Annual Wellness Exam, performed 12 months or more after the Initial AWV or the last Subsequent AWV    I have reviewed the patient's medical history in detail and updated the computerized patient record. History     Patient Active Problem List   Diagnosis Code    Hypertension I10    CVA (cerebral infarction) I63.9    Dyslipidemia, goal LDL below 100 E78.5    Cerebral infarction due to thrombosis of precerebral artery ([facility]) I63.00    Primary osteoarthritis of knee M17.10    PAD (peripheral artery disease) ([facility]) I73.9    Primary osteoarthritis of both knees M17.0     Past Medical History:   Diagnosis Date    Hypertension 6/15/2010      Past Surgical History:   Procedure Laterality Date    HX BREAST BIOPSY  1990's    neg     Current Outpatient Medications   Medication Sig Dispense Refill    atorvastatin (LIPITOR) 20 mg tablet Take 1 Tab by mouth daily. 30 Tab 5    amLODIPine (NORVASC) 10 mg tablet TAKE ONE TABLET BY MOUTH EVERY DAY 30 Tab 5    spironolactone (ALDACTONE) 25 mg tablet TAKE ONE TABLET BY MOUTH TWICE DAILY 60 Tab 5    aspirin 81 mg chewable tablet Take 81 mg by mouth daily. Allergies   Allergen Reactions    Ace Inhibitors Cough    Asa-Acetaminophen-Salicyl-Caff Not Reported This Time    Motrin [Ibuprofen] Hives and Swelling       Family History   Problem Relation Age of Onset    Heart Disease Mother     Diabetes Mother     Hypertension Mother     Breast Cancer Maternal Aunt      Social History     Tobacco Use    Smoking status: Never Smoker    Smokeless tobacco: Never Used   Substance Use Topics    Alcohol use: No       Depression Risk Factor Screening:     3 most recent PHQ Screens 7/8/2020   Little interest or pleasure in doing things Not at all   Feeling down, depressed, irritable, or hopeless Not at all   Total Score PHQ 2 0       Alcohol Risk Factor Screening:   Do you average 1 drink per night or more than 7 drinks a week:  No    On any one occasion in the past three months have you have had more than 3 drinks containing alcohol:  No      Functional Ability and Level of Safety:   Hearing: Hearing is good. Activities of Daily Living: The home contains: no safety equipment. Patient does total self care     Ambulation: with mild difficulty     Fall Risk:  Fall Risk Assessment, last 12 mths 7/8/2020   Able to walk? Yes   Fall in past 12 months? No     Abuse Screen:  Patient is not abused       Cognitive Screening   Has your family/caregiver stated any concerns about your memory: no    Cognitive Screening: Normal - Verbal Fluency Test    Patient Care Team   Patient Care Team:  Mil Conklin MD as PCP - General  Mil Conklin MD as PCP - Gifty Huerta Provider    Assessment/Plan   Education and counseling provided:  Are appropriate based on today's review and evaluation    Diagnoses and all orders for this visit:    1. PAD (peripheral artery disease) ([facility] [address]    2. Encounter for screening mammogram for malignant neoplasm of breast  -     LUCIANO MAMMO BI SCREENING INCL CAD; Future    3.  Postmenopausal state  -     DEXA BONE DENSITY STUDY AXIAL; Future        Health Maintenance Due   Topic Date Due    Hepatitis C Screening  1950    Shingrix Vaccine Age 50> (1 of 2) 05/29/2001    GLAUCOMA SCREENING Q2Y  05/29/2016    Bone Densitometry (Dexa) Screening  05/29/2016    Pneumococcal 65+ years (1 of 1 - PPSV23) 05/29/2016    Breast Cancer Screen Mammogram  03/22/2019    Medicare Yearly Exam  05/18/2019    Lipid Screen  07/03/2019    Colonoscopy  04/28/2020       Kelsi Kiser, who was evaluated through a synchronous (real-time) audio only encounter, and/or her healthcare decision maker, is aware that it is a billable service, with coverage as determined by her insurance carrier. She provided verbal consent to proceed: Yes, and patient identification was verified. It was conducted pursuant to the emergency declaration under the [address] [place] [address], [address] [place] [address] authority and the [employer] Act. A caregiver was present when appropriate. Ability to conduct physical exam was limited. I was at home. The patient was at home   Chief Complaint   Patient presents with    Medication Refill     Chief Complaint   Patient presents with    Medication Refill     SUBJECTIVE: Kelsi Kiser is a 72 y.o. female seen for a follow up visit; she has hypertension, hyperlipidemia and history of prior stroke. Current Outpatient Medications   Medication Sig Dispense Refill    atorvastatin (LIPITOR) 20 mg tablet Take 1 Tab by mouth daily. 90 Tab 1    amLODIPine (NORVASC) 10 mg tablet TAKE ONE TABLET BY MOUTH EVERY DAY 90 Tab 1    spironolactone (ALDACTONE) 25 mg tablet TAKE ONE TABLET BY MOUTH TWICE DAILY 180 Tab 1    aspirin 81 mg chewable tablet Take 81 mg by mouth daily.        Patient Active Problem List   Diagnosis Code    Hypertension I10    CVA (cerebral infarction) I63.9    Dyslipidemia, goal LDL below 100 E78.5    Cerebral infarction due to thrombosis of precerebral artery ([facility]) I63.00    Primary osteoarthritis of knee M17.10    PAD (peripheral artery disease) ([facility]) I73.9    Primary osteoarthritis of both knees M17.0     System Review: Cardiovascular ROS - taking medications as instructed, no medication side effects noted, no TIA's, no chest pain on exertion, no dyspnea on exertion, no swelling of ankles, CNS ROS - no TIA or stroke-like symptoms, no amaurosis, diplopia, abnormal speech, unilateral numbness or weakness. New concerns:knees worse in cold weather    OBJECTIVE:   Sounds well fluent  There were no vitals taken for this visit. homr BP reviewed        Lab review: orders written for new lab studies as appropriate; see orders. ASSESSMENT:  hypertension stable, hyperlipidemia stable, cerebrovascular disease stable, continue asa. Knee DJD likely use tylenol and aspercream    PLAN:  current treatment plan is effective, no change in therapy. Diagnoses and all orders for this visit:    1. Essential hypertension  -     CBC WITH AUTOMATED DIFF; Future  -     METABOLIC PANEL, COMPREHENSIVE; Future  -     URIC ACID; Future  -     MAGNESIUM; Future    2. PAD (peripheral artery disease) ([facility] [address]    3. Encounter for screening mammogram for malignant neoplasm of breast  -     [facility] MAMMO BI SCREENING INCL CAD; Future    4. Postmenopausal state  -     DEXA BONE DENSITY STUDY AXIAL; Future    5. Dyslipidemia, goal LDL below 100  -     LIPID PANEL; Future  -     atorvastatin (LIPITOR) 20 mg tablet; Take 1 Tab by mouth daily. 6. Colon cancer screening  -     COLOGUARD TEST (FECAL DNA COLORECTAL CANCER SCREENING)    7.  Essential hypertension with goal blood pressure less than 130/80  -     amLODIPine (NORVASC) 10 mg tablet; TAKE ONE TABLET BY MOUTH EVERY DAY    Other orders  -     spironolactone (ALDACTONE) 25 mg tablet; TAKE ONE TABLET BY MOUTH TWICE DAILY      High risk medications reviewed  Prolonged visit with 15 minutes of time out  of more than a  25 minute visit spent counseling patient and family and formulation of care    Requested Prescriptions     Signed Prescriptions Disp Refills    atorvastatin (LIPITOR) 20 mg tablet 90 Tab 1     Sig: Take 1 Tab by mouth daily.  amLODIPine (NORVASC) 10 mg tablet 90 Tab 1     Sig: TAKE ONE TABLET BY MOUTH EVERY DAY    spironolactone (ALDACTONE) 25 mg tablet 180 Tab 1     Sig: TAKE ONE TABLET BY MOUTH TWICE DAILY     Vaccines discussed  .     Dodie Cazares MD

## 2020-07-08 NOTE — PROGRESS NOTES
1. Have you been to the ER, urgent care clinic since your last visit? Hospitalized since your last visit? No    2. Have you seen or consulted any other health care providers outside of the [address] [place] [address] since your last visit? Include any pap smears or colon screening.  No   Chief Complaint   Patient presents with    Medication Refill

## 2020-07-15 ENCOUNTER — HOSPITAL ENCOUNTER (OUTPATIENT)
Dept: LAB | Age: 70
Discharge: HOME | End: 2020-07-15

## 2020-07-15 DIAGNOSIS — I10 ESSENTIAL HYPERTENSION: ICD-10-CM

## 2020-07-15 DIAGNOSIS — E78.5 DYSLIPIDEMIA, GOAL LDL BELOW 100: ICD-10-CM

## 2020-07-15 LAB
ALBUMIN SERPL-MCNC: 3.6 G/DL (ref 3.5–5)
ALBUMIN/GLOB SERPL: 0.9 {RATIO} (ref 1.1–2.2)
ALP SERPL-CCNC: 113 U/L (ref 45–117)
ALT SERPL-CCNC: 25 U/L (ref 12–78)
ANION GAP SERPL CALC-SCNC: 6 MMOL/L (ref 5–15)
AST SERPL-CCNC: 17 U/L (ref 15–37)
BASOPHILS # BLD: 0 K/UL (ref 0–0.1)
BASOPHILS NFR BLD: 0 % (ref 0–1)
BILIRUB SERPL-MCNC: 0.5 MG/DL (ref 0.2–1)
BUN SERPL-MCNC: 17 MG/DL (ref 6–20)
BUN/CREAT SERPL: 16 (ref 12–20)
CALCIUM SERPL-MCNC: 8.6 MG/DL (ref 8.5–10.1)
CHLORIDE SERPL-SCNC: 105 MMOL/L (ref 97–108)
CHOLEST SERPL-MCNC: 149 MG/DL
CO2 SERPL-SCNC: 25 MMOL/L (ref 21–32)
CREAT SERPL-MCNC: 1.09 MG/DL (ref 0.55–1.02)
DIFFERENTIAL METHOD BLD: NORMAL
EOSINOPHIL # BLD: 0.1 K/UL (ref 0–0.4)
EOSINOPHIL NFR BLD: 1 % (ref 0–7)
ERYTHROCYTE [DISTWIDTH] IN BLOOD BY AUTOMATED COUNT: 13.1 % (ref 11.5–14.5)
GLOBULIN SER CALC-MCNC: 4.2 G/DL (ref 2–4)
GLUCOSE SERPL-MCNC: 88 MG/DL (ref 65–100)
HCT VFR BLD AUTO: 40.8 % (ref 35–47)
HDLC SERPL-MCNC: 45 MG/DL
HDLC SERPL: 3.3 {RATIO} (ref 0–5)
HGB BLD-MCNC: 13.4 G/DL (ref 11.5–16)
IMM GRANULOCYTES # BLD AUTO: 0 K/UL (ref 0–0.04)
IMM GRANULOCYTES NFR BLD AUTO: 0 % (ref 0–0.5)
LDLC SERPL CALC-MCNC: 86.8 MG/DL (ref 0–100)
LIPID PROFILE,FLP: NORMAL
LYMPHOCYTES # BLD: 3 K/UL (ref 0.8–3.5)
LYMPHOCYTES NFR BLD: 36 % (ref 12–49)
MAGNESIUM SERPL-MCNC: 2.3 MG/DL (ref 1.6–2.4)
MCH RBC QN AUTO: 30 PG (ref 26–34)
MCHC RBC AUTO-ENTMCNC: 32.8 G/DL (ref 30–36.5)
MCV RBC AUTO: 91.3 FL (ref 80–99)
MONOCYTES # BLD: 0.9 K/UL (ref 0–1)
MONOCYTES NFR BLD: 11 % (ref 5–13)
NEUTS SEG # BLD: 4.4 K/UL (ref 1.8–8)
NEUTS SEG NFR BLD: 52 % (ref 32–75)
NRBC # BLD: 0 K/UL (ref 0–0.01)
NRBC BLD-RTO: 0 PER 100 WBC
PLATELET # BLD AUTO: 292 K/UL (ref 150–400)
PMV BLD AUTO: 10 FL (ref 8.9–12.9)
POTASSIUM SERPL-SCNC: 3.9 MMOL/L (ref 3.5–5.1)
PROT SERPL-MCNC: 7.8 G/DL (ref 6.4–8.2)
RBC # BLD AUTO: 4.47 M/UL (ref 3.8–5.2)
SODIUM SERPL-SCNC: 136 MMOL/L (ref 136–145)
TRIGL SERPL-MCNC: 86 MG/DL (ref ?–150)
URATE SERPL-MCNC: 6.2 MG/DL (ref 2.6–6)
VLDLC SERPL CALC-MCNC: 17.2 MG/DL
WBC # BLD AUTO: 8.4 K/UL (ref 3.6–11)

## 2020-07-17 ENCOUNTER — HOSPITAL ENCOUNTER (OUTPATIENT)
Dept: MAMMOGRAPHY | Age: 70
Discharge: HOME | End: 2020-07-17
Attending: INTERNAL MEDICINE
Payer: MEDICARE

## 2020-07-17 DIAGNOSIS — Z12.31 ENCOUNTER FOR SCREENING MAMMOGRAM FOR MALIGNANT NEOPLASM OF BREAST: ICD-10-CM

## 2020-07-17 DIAGNOSIS — Z78.0 POSTMENOPAUSAL STATE: ICD-10-CM

## 2020-07-17 PROCEDURE — 77080 DXA BONE DENSITY AXIAL: CPT

## 2020-07-17 PROCEDURE — 77067 SCR MAMMO BI INCL CAD: CPT

## 2021-03-30 DIAGNOSIS — I10 ESSENTIAL HYPERTENSION WITH GOAL BLOOD PRESSURE LESS THAN 130/80: ICD-10-CM

## 2021-03-30 DIAGNOSIS — E78.5 DYSLIPIDEMIA, GOAL LDL BELOW 100: ICD-10-CM

## 2021-03-30 RX ORDER — SPIRONOLACTONE 25 MG/1
TABLET ORAL
Qty: 180 TAB | Refills: 3 | OUTPATIENT
Start: 2021-03-30

## 2021-03-30 RX ORDER — AMLODIPINE BESYLATE 10 MG/1
TABLET ORAL
Qty: 90 TAB | Refills: 3 | OUTPATIENT
Start: 2021-03-30

## 2021-03-30 RX ORDER — ATORVASTATIN CALCIUM 20 MG/1
TABLET, FILM COATED ORAL
Qty: 90 TAB | Refills: 3 | OUTPATIENT
Start: 2021-03-30

## 2021-05-03 ENCOUNTER — OFFICE VISIT (OUTPATIENT)
Dept: INTERNAL MEDICINE CLINIC | Age: 71
End: 2021-05-03
Payer: MEDICARE

## 2021-05-03 VITALS
RESPIRATION RATE: 16 BRPM | HEIGHT: 62 IN | TEMPERATURE: 97.4 F | DIASTOLIC BLOOD PRESSURE: 77 MMHG | HEART RATE: 76 BPM | SYSTOLIC BLOOD PRESSURE: 133 MMHG | BODY MASS INDEX: 34.34 KG/M2 | OXYGEN SATURATION: 98 % | WEIGHT: 186.6 LBS

## 2021-05-03 DIAGNOSIS — I10 ESSENTIAL HYPERTENSION: Primary | ICD-10-CM

## 2021-05-03 DIAGNOSIS — I10 ESSENTIAL HYPERTENSION WITH GOAL BLOOD PRESSURE LESS THAN 130/80: ICD-10-CM

## 2021-05-03 DIAGNOSIS — I73.9 PAD (PERIPHERAL ARTERY DISEASE) (HCC): ICD-10-CM

## 2021-05-03 DIAGNOSIS — E78.5 DYSLIPIDEMIA, GOAL LDL BELOW 100: ICD-10-CM

## 2021-05-03 PROBLEM — I87.9 VEIN DISORDER: Status: ACTIVE | Noted: 2021-05-03

## 2021-05-03 PROCEDURE — G8432 DEP SCR NOT DOC, RNG: HCPCS | Performed by: INTERNAL MEDICINE

## 2021-05-03 PROCEDURE — G9899 SCRN MAM PERF RSLTS DOC: HCPCS | Performed by: INTERNAL MEDICINE

## 2021-05-03 PROCEDURE — G8427 DOCREV CUR MEDS BY ELIG CLIN: HCPCS | Performed by: INTERNAL MEDICINE

## 2021-05-03 PROCEDURE — 3017F COLORECTAL CA SCREEN DOC REV: CPT | Performed by: INTERNAL MEDICINE

## 2021-05-03 PROCEDURE — 99214 OFFICE O/P EST MOD 30 MIN: CPT | Performed by: INTERNAL MEDICINE

## 2021-05-03 PROCEDURE — G8752 SYS BP LESS 140: HCPCS | Performed by: INTERNAL MEDICINE

## 2021-05-03 PROCEDURE — 1090F PRES/ABSN URINE INCON ASSESS: CPT | Performed by: INTERNAL MEDICINE

## 2021-05-03 PROCEDURE — G8419 CALC BMI OUT NRM PARAM NOF/U: HCPCS | Performed by: INTERNAL MEDICINE

## 2021-05-03 PROCEDURE — G8754 DIAS BP LESS 90: HCPCS | Performed by: INTERNAL MEDICINE

## 2021-05-03 PROCEDURE — G8399 PT W/DXA RESULTS DOCUMENT: HCPCS | Performed by: INTERNAL MEDICINE

## 2021-05-03 PROCEDURE — G8536 NO DOC ELDER MAL SCRN: HCPCS | Performed by: INTERNAL MEDICINE

## 2021-05-03 PROCEDURE — 1101F PT FALLS ASSESS-DOCD LE1/YR: CPT | Performed by: INTERNAL MEDICINE

## 2021-05-03 RX ORDER — AMLODIPINE BESYLATE 10 MG/1
TABLET ORAL
Qty: 90 TAB | Refills: 1 | Status: SHIPPED | OUTPATIENT
Start: 2021-05-03 | End: 2021-12-06

## 2021-05-03 RX ORDER — SPIRONOLACTONE 25 MG/1
TABLET ORAL
Qty: 180 TAB | Refills: 1 | Status: SHIPPED | OUTPATIENT
Start: 2021-05-03 | End: 2022-02-01

## 2021-05-03 RX ORDER — ATORVASTATIN CALCIUM 20 MG/1
TABLET, FILM COATED ORAL
Qty: 90 TAB | Refills: 1 | Status: SHIPPED | OUTPATIENT
Start: 2021-05-03 | End: 2022-02-01

## 2021-05-03 NOTE — PROGRESS NOTES
1. Have you been to the ER, urgent care clinic since your last visit? Hospitalized since your last visit? No    2. Have you seen or consulted any other health care providers outside of the [address] [place] [address] since your last visit? Include any pap smears or colon screening.  No   Chief Complaint   Patient presents with    Blood Pressure Check    Cerebrovascular Accident    Osteoarthritis    Gallbladder Attack    Medication Refill

## 2021-05-04 NOTE — PROGRESS NOTES
Chief Complaint   Patient presents with    Blood Pressure Check    Cerebrovascular Accident    Osteoarthritis    Gallbladder Attack    Medication Refill     Chief Complaint   Patient presents with    Blood Pressure Check    Cerebrovascular Accident    Osteoarthritis    Gallbladder Attack    Medication Refill   old stroke no 11 years ago  SUBJECTIVE: Abimael Mendieta is a 72 y.o. female seen for a follow up visit; she has hypertension, hyperlipidemia and history of prior stroke. Current Outpatient Medications   Medication Sig Dispense Refill    atorvastatin (LIPITOR) 20 mg tablet TAKE 1 TABLET BY MOUTH  DAILY 90 Tab 1    spironolactone (ALDACTONE) 25 mg tablet TAKE 1 TABLET BY MOUTH  TWICE DAILY 180 Tab 1    amLODIPine (NORVASC) 10 mg tablet TAKE 1 TABLET BY MOUTH  DAILY 90 Tab 1    aspirin 81 mg chewable tablet Take 81 mg by mouth daily. 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   New concerns: knee pain unilateral. Told severe DJD has morning stiffness   Tolerates it so far    OBJECTIVE:  Visit Vitals  /77 (BP 1 Location: Left upper arm, BP Patient Position: Sitting, BP Cuff Size: Adult)   Pulse 76   Temp 97.4 °F (36.3 °C) (Temporal)   Resp 16   Ht 5' 2\" (1.575 m)   Wt 186 lb 9.6 oz (84.6 kg)   SpO2 98%   BMI 34.13 kg/m²      Appearance: alert, well appearing, and in no distress and oriented to person, place, and time. General exam: CVS exam BP noted to be well controlled today in office, S1, S2 normal, no gallop, no murmur, chest clear, no JVD, no HSM, no edema. Knee exam: bilateral positive for moderate crepitations, some mild tenderness and pain on range of motion, nolarge effusion is present, no pseudo laxity noted. Knee rom reduced    Gait normal  Non focal neyuro  Lab review: orders written for new lab studies as appropriate; see orders. ASSESSMENT:  hypertension stable, hyperlipidemia stable, cerebrovascular disease stable, continue asa. Knee DJD likely use tylenol and aspercream    PLAN:  current treatment plan is effective, no change in therapy. Diagnoses and all orders for this visit:    1. Essential hypertension  -     CBC WITH AUTOMATED DIFF; Future  -     METABOLIC PANEL, COMPREHENSIVE; Future  -     LIPID PANEL; Future  -     MAGNESIUM; Future    2. PAD (peripheral artery disease) ([facility] [address]    3.  Dyslipidemia, goal LDL below 100  -     atorvastatin (LIPITOR) 20 mg tablet; TAKE 1 TABLET BY MOUTH  DAILY    4. Essential hypertension with goal blood pressure less than 130/80  -     amLODIPine (NORVASC) 10 mg tablet; TAKE 1 TABLET BY MOUTH  DAILY    Other orders  -     spironolactone (ALDACTONE) 25 mg tablet; TAKE 1 TABLET BY MOUTH  TWICE DAILY      High risk medications reviewed  Prolonged visit with 15 minutes of time out  of more than a  25 minute visit spent counseling patient and family and formulation of care    Requested Prescriptions     Signed Prescriptions Disp Refills    atorvastatin (LIPITOR) 20 mg tablet 90 Tab 1     Sig: TAKE 1 TABLET BY MOUTH  DAILY    spironolactone (ALDACTONE) 25 mg tablet 180 Tab 1     Sig: TAKE 1 TABLET BY MOUTH  TWICE DAILY    amLODIPine (NORVASC) 10 mg tablet 90 Tab 1     Sig: TAKE 1 TABLET BY MOUTH  DAILY

## 2021-11-15 ENCOUNTER — TELEPHONE (OUTPATIENT)
Dept: PHARMACY | Age: 71
End: 2021-11-15

## 2021-11-15 NOTE — TELEPHONE ENCOUNTER
CLINICAL PHARMACY: ADHERENCE REVIEW  Identified care gap per Maru; fills at [facility]: Statin adherence    Last Visit: 5/3/21    Patient not found in Outcomes MTM    [address]    Per Insurance Records through 11/6/21 (YTD [place] = 81%; Potential Fail Date: 11/19/21): Atorvastatin 20mg daily last filled on 6/1/21 for 90 day supply. Next refill due: 9/26/21    Per chart, reordered 5/3/21 for #90, 1 refill    Lab Results   Component Value Date/Time    Cholesterol, total 149 07/15/2020 08:00 AM    HDL Cholesterol 45 07/15/2020 08:00 AM    LDL, calculated 86.8 07/15/2020 08:00 AM    VLDL, calculated 17.2 07/15/2020 08:00 AM    Triglyceride 86 07/15/2020 08:00 AM    CHOL/HDL Ratio 3.3 07/15/2020 08:00 AM     ALT (SGPT)   Date Value Ref Range Status   07/15/2020 25 12 - 78 U/L Final     AST (SGOT)   Date Value Ref Range Status   07/15/2020 17 15 - 37 U/L Final     The ASCVD Risk score (Stephani Castillots., et al., 2013) failed to calculate for the following reasons: The patient has a prior MI or stroke diagnosis     PLAN  The following are interventions that have been identified:  - Patient overdue refilling atorvastatin and active on home medication list - should have refill/s left on rx at [facility]    Attempting to reach patient to review.  Left message asking for return call. No future appointments.     Marquis Stephenson, PharmD, [address] [facility], toll free: [phone], option 2

## 2021-11-17 NOTE — TELEPHONE ENCOUNTER
Reached patient to review. Reports she plans to reorder her rxs today from [facility], including atorvastatin. Unclear how she sets up or organizes her medications, but states she got ahead on fills/supply.  Denies missing doses or ADRs.    =========================================================   For Pharmacy [facility] [address] in place: No   Recommendation Provided To: Patient/Caregiver: 1 via Telephone   Intervention Detail: Adherence Monitorin   Gap Closed?: Yes   Intervention Accepted By: Patient/Caregiver: 1   Time Spent (min): 10

## 2021-12-06 DIAGNOSIS — I10 ESSENTIAL HYPERTENSION WITH GOAL BLOOD PRESSURE LESS THAN 130/80: ICD-10-CM

## 2021-12-06 RX ORDER — AMLODIPINE BESYLATE 10 MG/1
TABLET ORAL
Qty: 90 TABLET | Refills: 3 | Status: SHIPPED | OUTPATIENT
Start: 2021-12-06 | End: 2022-04-27 | Stop reason: SDUPTHER

## 2022-01-26 LAB — SARS-COV-2, NAA: POSITIVE

## 2022-03-18 PROBLEM — M17.10 PRIMARY OSTEOARTHRITIS OF KNEE: Status: ACTIVE | Noted: 2018-05-17

## 2022-03-18 PROBLEM — I73.9 PAD (PERIPHERAL ARTERY DISEASE) (HCC): Status: ACTIVE | Noted: 2019-06-10

## 2022-03-18 PROBLEM — M17.0 PRIMARY OSTEOARTHRITIS OF BOTH KNEES: Status: ACTIVE | Noted: 2019-06-10

## 2022-03-19 PROBLEM — I87.9 VEIN DISORDER: Status: ACTIVE | Noted: 2021-05-03

## 2022-04-27 ENCOUNTER — OFFICE VISIT (OUTPATIENT)
Dept: INTERNAL MEDICINE CLINIC | Age: 72
End: 2022-04-27
Payer: MEDICARE

## 2022-04-27 VITALS
SYSTOLIC BLOOD PRESSURE: 121 MMHG | OXYGEN SATURATION: 98 % | WEIGHT: 186.2 LBS | RESPIRATION RATE: 17 BRPM | HEART RATE: 68 BPM | TEMPERATURE: 98.1 F | BODY MASS INDEX: 34.06 KG/M2 | DIASTOLIC BLOOD PRESSURE: 70 MMHG

## 2022-04-27 DIAGNOSIS — Z12.31 ENCOUNTER FOR SCREENING MAMMOGRAM FOR MALIGNANT NEOPLASM OF BREAST: ICD-10-CM

## 2022-04-27 DIAGNOSIS — I67.9 CEREBRAL VASCULAR DISEASE: ICD-10-CM

## 2022-04-27 DIAGNOSIS — M17.0 PRIMARY OSTEOARTHRITIS OF BOTH KNEES: ICD-10-CM

## 2022-04-27 DIAGNOSIS — Z00.00 MEDICARE ANNUAL WELLNESS VISIT, SUBSEQUENT: ICD-10-CM

## 2022-04-27 DIAGNOSIS — I73.9 PAD (PERIPHERAL ARTERY DISEASE) (HCC): ICD-10-CM

## 2022-04-27 DIAGNOSIS — I10 ESSENTIAL HYPERTENSION WITH GOAL BLOOD PRESSURE LESS THAN 130/80: Primary | ICD-10-CM

## 2022-04-27 PROCEDURE — G8427 DOCREV CUR MEDS BY ELIG CLIN: HCPCS | Performed by: INTERNAL MEDICINE

## 2022-04-27 PROCEDURE — G9899 SCRN MAM PERF RSLTS DOC: HCPCS | Performed by: INTERNAL MEDICINE

## 2022-04-27 PROCEDURE — 1101F PT FALLS ASSESS-DOCD LE1/YR: CPT | Performed by: INTERNAL MEDICINE

## 2022-04-27 PROCEDURE — 3017F COLORECTAL CA SCREEN DOC REV: CPT | Performed by: INTERNAL MEDICINE

## 2022-04-27 PROCEDURE — 99214 OFFICE O/P EST MOD 30 MIN: CPT | Performed by: INTERNAL MEDICINE

## 2022-04-27 PROCEDURE — G8419 CALC BMI OUT NRM PARAM NOF/U: HCPCS | Performed by: INTERNAL MEDICINE

## 2022-04-27 PROCEDURE — G0439 PPPS, SUBSEQ VISIT: HCPCS | Performed by: INTERNAL MEDICINE

## 2022-04-27 PROCEDURE — G8432 DEP SCR NOT DOC, RNG: HCPCS | Performed by: INTERNAL MEDICINE

## 2022-04-27 PROCEDURE — 1090F PRES/ABSN URINE INCON ASSESS: CPT | Performed by: INTERNAL MEDICINE

## 2022-04-27 PROCEDURE — G8752 SYS BP LESS 140: HCPCS | Performed by: INTERNAL MEDICINE

## 2022-04-27 PROCEDURE — G8399 PT W/DXA RESULTS DOCUMENT: HCPCS | Performed by: INTERNAL MEDICINE

## 2022-04-27 PROCEDURE — G8536 NO DOC ELDER MAL SCRN: HCPCS | Performed by: INTERNAL MEDICINE

## 2022-04-27 PROCEDURE — G8754 DIAS BP LESS 90: HCPCS | Performed by: INTERNAL MEDICINE

## 2022-04-27 RX ORDER — AMLODIPINE BESYLATE 10 MG/1
10 TABLET ORAL DAILY
Qty: 90 TABLET | Refills: 3 | Status: SHIPPED | OUTPATIENT
Start: 2022-04-27 | End: 2022-07-19 | Stop reason: SDUPTHER

## 2022-04-27 NOTE — PATIENT INSTRUCTIONS

## 2022-04-27 NOTE — PROGRESS NOTES
Identified pt with two pt identifiers(name and ). Reviewed record in preparation for visit and have obtained necessary documentation. All patient medications has been reviewed. Chief Complaint   Patient presents with   [address] [place] [address] Annual Wellness Visit       3 most recent PHQ Screens 2020   Little interest or pleasure in doing things Not at all   Feeling down, depressed, irritable, or hopeless Not at all   Total Score PHQ 2 0     Abuse Screening Questionnaire 2022   Do you ever feel afraid of your partner? N   Are you in a relationship with someone who physically or mentally threatens you? N   Is it safe for you to go home? Y       Health Maintenance Due   Topic    Hepatitis C Screening     Shingrix Vaccine Age 49> (1 of 2)    Pneumococcal 65+ years (1 - PCV)    Depression Screen     Medicare Yearly Exam     Lipid Screen     Breast Cancer Screen Mammogram      Health Maintenance Review: Patient reminded of \"due or due soon\" health maintenance. I have asked the patient to contact his/her primary care provider (PCP) for follow-up on his/her health maintenance. Vitals:    22 0954   BP: 121/70   Pulse: 68   Resp: 17   Temp: 98.1 °F (36.7 °C)   TempSrc: Temporal   SpO2: 98%   Weight: 186 lb 3.2 oz (84.5 kg)   PainSc:   0 - No pain       Wt Readings from Last 3 Encounters:   22 186 lb 3.2 oz (84.5 kg)   21 186 lb 9.6 oz (84.6 kg)   19 190 lb (86.2 kg)     Temp Readings from Last 3 Encounters:   22 98.1 °F (36.7 °C) (Temporal)   21 97.4 °F (36.3 °C) (Temporal)   19 97.3 °F (36.3 °C) (Oral)     BP Readings from Last 3 Encounters:   22 121/70   21 133/77   19 126/75     Pulse Readings from Last 3 Encounters:   22 68   21 76   19 88       1. \"Have you been to the ER, urgent care clinic since your last visit? Hospitalized since your last visit? \" No    2.  \"Have you seen or consulted any other health care providers outside of the [facility] [address] [facility] since your last visit? \" No     3. For patients aged 39-70: Has the patient had a colonoscopy / FIT/ Cologuard? No      If the patient is female:    4. For patients aged 41-77: Has the patient had a mammogram within the past 2 years? Yes - no Care Gap present      5. For patients aged 21-65: Has the patient had a pap smear?  NA - based on age or sex

## 2022-04-27 NOTE — PROGRESS NOTES
This is the Subsequent Medicare Annual Wellness Exam, performed 12 months or more after the Initial AWV or the last Subsequent AWV    I have reviewed the patient's medical history in detail and updated the computerized patient record. Assessment/Plan   Education and counseling provided:  Are appropriate based on today's review and evaluation  Pneumococcal Vaccine  Influenza Vaccine  Screening Mammography    1. PAD (peripheral artery disease) ([facility]  2. Medicare annual wellness visit, subsequent  3. Encounter for screening mammogram for malignant neoplasm of breast  -     [facility] MAMMO BI SCREENING INCL CAD; Future       Depression Risk Factor Screening     3 most recent PHQ Screens 7/8/2020   Little interest or pleasure in doing things Not at all   Feeling down, depressed, irritable, or hopeless Not at all   Total Score PHQ 2 0       Alcohol & Drug Abuse Risk Screen    Do you average more than 1 drink per night or more than 7 drinks a week:  no    On any one occasion in the past three months have you have had more than 3 drinks containing alcohol:  No          Functional Ability and Level of Safety    Hearing: The patient needs further evaluation. Activities of Daily Living: The home contains: no safety equipment. Patient needs help with:  walking      Ambulation: with mild difficulty   due to knee arthritisFall Risk:  Fall Risk Assessment, last 12 mths 4/27/2022   Able to walk? Yes   Fall in past 12 months? 0   Do you feel unsteady?  0   Are you worried about falling 0      Abuse Screen:  Patient is not abused       Cognitive Screening    Has your family/caregiver stated any concerns about your memory: no     Cognitive Screening: Normal - Verbal Fluency Test    Health Maintenance Due     Health Maintenance Due   Topic Date Due    Shingrix Vaccine Age 49> (1 of 2) Never done    Depression Screen  07/08/2021    Lipid Screen  07/15/2021    Breast Cancer Screen Mammogram  07/17/2021       Patient Care Team Patient Care Team:  Mushtaq Bazzi MD as PCP - General  Mushtaq Bazzi MD as PCP - [facility]    History     Patient Active Problem List   Diagnosis Code    Hypertension I10    CVA (cerebral infarction) I63.9    Dyslipidemia, goal LDL below 100 E78.5    Cerebral infarction due to thrombosis of precerebral artery ([facility] I63.00    Primary osteoarthritis of knee M17.10    [facility]AD [facility]peripheral artery disease) ([facility]) I73.9    Primary osteoarthritis of both knees M17.0    Vein disorder I87.9     Past Medical History:   Diagnosis Date    Hypertension 6/15/2010      Past Surgical History:   Procedure Laterality Date    HX BREAST BIOPSY  1990's    neg     Current Outpatient Medications   Medication Sig Dispense Refill    spironolactone (ALDACTONE) 25 mg tablet TAKE 1 TABLET BY MOUTH  TWICE DAILY 180 Tablet 0    atorvastatin (LIPITOR) 20 mg tablet TAKE 1 TABLET BY MOUTH  DAILY 90 Tablet 0    amLODIPine (NORVASC) 10 mg tablet TAKE 1 TABLET BY MOUTH  DAILY 90 Tablet 3    aspirin 81 mg chewable tablet Take 81 mg by mouth daily. Allergies   Allergen Reactions    Ace Inhibitors Cough    Asa-Acetaminophen-Salicyl-Caff Not Reported This Time    Motrin [Ibuprofen] Hives and Swelling       Family History   Problem Relation Age of Onset    Heart Disease Mother     Diabetes Mother     Hypertension Mother     Breast Cancer Maternal Aunt      Social History     Tobacco Use    Smoking status: Never Smoker    Smokeless tobacco: Never Used   Substance Use Topics    Alcohol use: No     Handicap sticker form given. Chelsea Baird MD         SUBJECTIVE: Li Arboleda is a 80 y.o. female seen for a follow up visit; she has hypertension, hyperlipidemia, peripheral vascular disease and history of prior stroke. Current Outpatient Medications   Medication Sig Dispense Refill    amLODIPine (NORVASC) 10 mg tablet Take 1 Tablet by mouth daily.  90 Tablet 3    spironolactone (ALDACTONE) 25 mg tablet TAKE 1 TABLET BY MOUTH  TWICE DAILY 180 Tablet 0    atorvastatin (LIPITOR) 20 mg tablet TAKE 1 TABLET BY MOUTH  DAILY 90 Tablet 0    aspirin 81 mg chewable tablet Take 81 mg by mouth daily. Patient Active Problem List   Diagnosis Code    Hypertension I10    CVA (cerebral infarction) I63.9    Dyslipidemia, goal LDL below 100 E78.5    Cerebral infarction due to thrombosis of precerebral artery ([facility]) I63.00    Primary osteoarthritis of knee M17.10    PAD (peripheral artery disease) ([facility]) I73.9    Primary osteoarthritis of both knees M17.0    Vein disorder I87.9     System Review: Cardiovascular ROS - taking medications as instructed, no medication side effects noted, patient does not perform home BP monitoring, no TIA's, no chest pain on exertion, no dyspnea on exertion, no swelling of ankles, no orthostatic dizziness or lightheadedness, no palpitations, no intermittent claudication symptoms, CNS ROS - no TIA or stroke-like symptoms, no amaurosis, diplopia, abnormal speech, unilateral numbness or weakness. New concerns: refills  Handicap sticker. OBJECTIVE:  Visit Vitals  /70 (BP 1 Location: Left upper arm, BP Patient Position: Sitting, BP Cuff Size: Adult)   Pulse 68   Temp 98.1 °F (36.7 °C) (Temporal)   Resp 17   Wt 186 lb 3.2 oz (84.5 kg)   SpO2 98%   BMI 34.06 kg/m²      Appearance: alert, well appearing, and in no distress, oriented to person, place, and time and overweight. General exam: CVS exam BP noted to be well controlled today in office, S1, S2 normal, no gallop, no murmur, chest clear, no JVD, no HSM, no edema, neurological exam alert, oriented, normal speech, no focal findings or movement disorder noted, screening mental status exam normal, neck supple without rigidity, motor and sensory grossly normal bilaterally.   rom reduced both knees dark hyperpigmented legs  Lab review: orders written for new lab studies as appropriate; see orders, no lab studies available for review at time of visit. ASSESSMENT:  hypertension well controlled, stable, improved, hyperlipidemia control uncertain, cerebrovascular disease well controlled, stable, knee arthritis bilat will eventually need replacement . PLAN:  current treatment plan is effective, no change in therapy  lab results and schedule of future lab studies reviewed with patient  repeat labs ordered prior to next appointment  orders and follow up as documented in patient record  reviewed diet, exercise and weight control  recommended sodium restriction  reviewed medications and side effects in detail  reviewed potential future medication changes and side effects  use of aspirin to prevent MI and TIA's discussed. ASSESSMENT:.diagmed  1. PAD (peripheral artery disease) (HCC)  No issues now    2. Medicare annual wellness visit, subsequent  reviewed    3. Encounter for screening mammogram for malignant neoplasm of breast  due  - [facility] MAMMO BI SCREENING INCL CAD; Future    4. Essential hypertension with goal blood pressure less than 130/80  Controlled well  - amLODIPine (NORVASC) 10 mg tablet; Take 1 Tablet by mouth daily. Dispense: 90 Tablet; Refill: 3  - MAGNESIUM; Future  - METABOLIC PANEL, COMPREHENSIVE; Future  - LIPID PANEL; Future  - CBC WITH AUTOMATED DIFF; Future    5.  Primary osteoarthritis of both knees  Using topical

## 2022-05-19 ENCOUNTER — HOSPITAL ENCOUNTER (OUTPATIENT)
Dept: MAMMOGRAPHY | Age: 72
Discharge: HOME | End: 2022-05-19
Attending: INTERNAL MEDICINE
Payer: MEDICARE

## 2022-05-19 DIAGNOSIS — Z12.31 ENCOUNTER FOR SCREENING MAMMOGRAM FOR MALIGNANT NEOPLASM OF BREAST: ICD-10-CM

## 2022-05-19 PROCEDURE — 77067 SCR MAMMO BI INCL CAD: CPT

## 2022-06-21 DIAGNOSIS — E78.5 DYSLIPIDEMIA, GOAL LDL BELOW 100: ICD-10-CM

## 2022-06-22 RX ORDER — ATORVASTATIN CALCIUM 20 MG/1
TABLET, FILM COATED ORAL
Qty: 90 TABLET | Refills: 3 | Status: SHIPPED | OUTPATIENT
Start: 2022-06-22

## 2022-06-22 RX ORDER — SPIRONOLACTONE 25 MG/1
TABLET ORAL
Qty: 180 TABLET | Refills: 3 | Status: SHIPPED | OUTPATIENT
Start: 2022-06-22

## 2022-07-19 DIAGNOSIS — I10 ESSENTIAL HYPERTENSION WITH GOAL BLOOD PRESSURE LESS THAN 130/80: ICD-10-CM

## 2022-07-20 RX ORDER — AMLODIPINE BESYLATE 10 MG/1
10 TABLET ORAL DAILY
Qty: 90 TABLET | Refills: 3 | Status: SHIPPED | OUTPATIENT
Start: 2022-07-20

## 2022-09-15 ENCOUNTER — TELEPHONE (OUTPATIENT)
Dept: INTERNAL MEDICINE CLINIC | Age: 72
End: 2022-09-15

## 2022-09-15 NOTE — TELEPHONE ENCOUNTER
[facility]. She dropped off x ray and record of visit. She states she will call to schedule follow up based on how she feels after taking her medications that she was prescribed for a week.

## 2022-11-29 ENCOUNTER — OFFICE VISIT (OUTPATIENT)
Dept: INTERNAL MEDICINE CLINIC | Age: 72
End: 2022-11-29
Payer: MEDICARE

## 2022-11-29 VITALS
OXYGEN SATURATION: 97 % | DIASTOLIC BLOOD PRESSURE: 73 MMHG | SYSTOLIC BLOOD PRESSURE: 136 MMHG | BODY MASS INDEX: 33.64 KG/M2 | TEMPERATURE: 98 F | RESPIRATION RATE: 19 BRPM | HEIGHT: 62 IN | HEART RATE: 75 BPM | WEIGHT: 182.8 LBS

## 2022-11-29 DIAGNOSIS — K21.9 REFLUX LARYNGITIS: Primary | ICD-10-CM

## 2022-11-29 DIAGNOSIS — I10 ESSENTIAL HYPERTENSION WITH GOAL BLOOD PRESSURE LESS THAN 130/80: ICD-10-CM

## 2022-11-29 DIAGNOSIS — N18.31 STAGE 3A CHRONIC KIDNEY DISEASE (HCC): ICD-10-CM

## 2022-11-29 DIAGNOSIS — J04.0 REFLUX LARYNGITIS: Primary | ICD-10-CM

## 2022-11-29 PROBLEM — N18.30 CHRONIC RENAL DISEASE, STAGE III (HCC): Status: ACTIVE | Noted: 2022-11-29

## 2022-11-29 PROCEDURE — G8754 DIAS BP LESS 90: HCPCS | Performed by: INTERNAL MEDICINE

## 2022-11-29 PROCEDURE — 1101F PT FALLS ASSESS-DOCD LE1/YR: CPT | Performed by: INTERNAL MEDICINE

## 2022-11-29 PROCEDURE — G8419 CALC BMI OUT NRM PARAM NOF/U: HCPCS | Performed by: INTERNAL MEDICINE

## 2022-11-29 PROCEDURE — 3017F COLORECTAL CA SCREEN DOC REV: CPT | Performed by: INTERNAL MEDICINE

## 2022-11-29 PROCEDURE — 1090F PRES/ABSN URINE INCON ASSESS: CPT | Performed by: INTERNAL MEDICINE

## 2022-11-29 PROCEDURE — G8536 NO DOC ELDER MAL SCRN: HCPCS | Performed by: INTERNAL MEDICINE

## 2022-11-29 PROCEDURE — 99213 OFFICE O/P EST LOW 20 MIN: CPT | Performed by: INTERNAL MEDICINE

## 2022-11-29 PROCEDURE — G8432 DEP SCR NOT DOC, RNG: HCPCS | Performed by: INTERNAL MEDICINE

## 2022-11-29 PROCEDURE — G8752 SYS BP LESS 140: HCPCS | Performed by: INTERNAL MEDICINE

## 2022-11-29 PROCEDURE — G8399 PT W/DXA RESULTS DOCUMENT: HCPCS | Performed by: INTERNAL MEDICINE

## 2022-11-29 PROCEDURE — G9899 SCRN MAM PERF RSLTS DOC: HCPCS | Performed by: INTERNAL MEDICINE

## 2022-11-29 PROCEDURE — G8427 DOCREV CUR MEDS BY ELIG CLIN: HCPCS | Performed by: INTERNAL MEDICINE

## 2022-11-29 RX ORDER — PANTOPRAZOLE SODIUM 40 MG/1
40 TABLET, DELAYED RELEASE ORAL DAILY
Qty: 90 TABLET | Refills: 1 | Status: SHIPPED | OUTPATIENT
Start: 2022-11-29

## 2022-11-29 RX ORDER — PROMETHAZINE HYDROCHLORIDE AND DEXTROMETHORPHAN HYDROBROMIDE 6.25; 15 MG/5ML; MG/5ML
SYRUP ORAL
COMMUNITY
Start: 2022-10-07

## 2022-11-29 RX ORDER — PANTOPRAZOLE SODIUM 40 MG/1
TABLET, DELAYED RELEASE ORAL
COMMUNITY
Start: 2022-09-15 | End: 2022-11-29 | Stop reason: SDUPTHER

## 2022-11-29 NOTE — PROGRESS NOTES
No chief complaint on file. Vitals:    11/29/22 0950   BP: 136/73   Pulse: 75   Resp: 19   Temp: 98 °F (36.7 °C)   TempSrc: Temporal   SpO2: 97%   Weight: 182 lb 12.8 oz (82.9 kg)   Height: 5' 2\" (1.575 m)   PainSc:   0 - No pain       Current Outpatient Medications on File Prior to Visit   Medication Sig Dispense Refill    amLODIPine (NORVASC) 10 mg tablet Take 1 Tablet by mouth in the morning. 90 Tablet 3    spironolactone (ALDACTONE) 25 mg tablet TAKE 1 TABLET BY MOUTH  TWICE DAILY 180 Tablet 3    aspirin 81 mg chewable tablet Take 81 mg by mouth daily. atorvastatin (LIPITOR) 20 mg tablet TAKE 1 TABLET BY MOUTH  DAILY 90 Tablet 3     No current facility-administered medications on file prior to visit. Health Maintenance Due   Topic    Depression Screen     Shingrix Vaccine Age 50> (1 of 2)    DTaP/Tdap/Td series (2 - Td or Tdap)    COVID-19 Vaccine (4 - Booster for [employer] series)    Flu Vaccine (1)       1. \"Have you been to the ER, urgent care clinic since your last visit? Hospitalized since your last visit? \" Yes [facility]  [facility] October 2. \"Have you seen or consulted any other health care providers outside of the [address] [place] [address] since your last visit? \" No     3. For patients aged 39-70: Has the patient had a colonoscopy / FIT/ Cologuard? No      If the patient is female:    4. For patients aged 41-77: Has the patient had a mammogram within the past 2 years? Yes - no Care Gap present      5. For patients aged 21-65: Has the patient had a pap smear?  NA - based on age or sex

## 2022-11-29 NOTE — PROGRESS NOTES
Urgent care in sept  fotr cough  Neg chest xray    Went to Urgent Care for a chronic cough one to two months back in September, I do not have the reports, nor does she have them. Her chest x-ray she tells me was normal.  They put her on Protonix 40 one daily for 30 days and they gave her a cough medicine. She had no viral syndrome with it. She still has no viral syndrome. She said she was lot better for a month, but the symptoms have come back. She has a little bit of an irritating post nasal drip and cough. Denies reflux or gastric reflux symptoms. Denies gastritis. Denies indigestion. Denies hematemesis. Denies melanotic stools. Blood pressure normal.  Belly soft. Lungs are clear. She seems to have GERD of the upper airway type, responded to a PPI. I have put her on reflux meds. Elevate head of the bed. She did well with Protonix. So, therefore we will put her on 40 of Protonix daily. She will return and see me in about four months to see how she is doing. Vitals:    11/29/22 0950   BP: 136/73   Pulse: 75   Resp: 19   Temp: 98 °F (36.7 °C)   TempSrc: Temporal   SpO2: 97%   Weight: 182 lb 12.8 oz (82.9 kg)   Height: 5' 2\" (1.575 m)     1. Stage 3a chronic kidney disease ([facility]  follow    2. Reflux laryngitis  Ppi and elevate hob  - pantoprazole (PROTONIX) 40 mg tablet; Take 1 Tablet by mouth daily. Dispense: 90 Tablet; Refill: 1    3.  Essential hypertension with goal blood pressure less than 130/80  Controlled use amlodipine at night

## 2023-03-14 LAB
CREATININE, EXTERNAL: 1.16
SARS-COV-2: NEGATIVE

## 2023-03-22 DIAGNOSIS — I10 ESSENTIAL HYPERTENSION WITH GOAL BLOOD PRESSURE LESS THAN 130/80: ICD-10-CM

## 2023-03-22 DIAGNOSIS — E78.5 DYSLIPIDEMIA, GOAL LDL BELOW 100: ICD-10-CM

## 2023-03-22 RX ORDER — ATORVASTATIN CALCIUM 20 MG/1
20 TABLET, FILM COATED ORAL DAILY
Qty: 90 TABLET | Refills: 3 | Status: SHIPPED | OUTPATIENT
Start: 2023-03-22

## 2023-03-22 RX ORDER — AMLODIPINE BESYLATE 10 MG/1
10 TABLET ORAL DAILY
Qty: 90 TABLET | Refills: 3 | Status: SHIPPED | OUTPATIENT
Start: 2023-03-22

## 2023-03-22 RX ORDER — SPIRONOLACTONE 25 MG/1
25 TABLET ORAL 2 TIMES DAILY
Qty: 180 TABLET | Refills: 3 | Status: SHIPPED | OUTPATIENT
Start: 2023-03-22

## 2023-03-22 NOTE — TELEPHONE ENCOUNTER
PCP: Jose Miguel Perez MD    Last appt: 11/29/2022  Future Appointments   Date Time Provider Columba Hickman   5/2/2023  9:00 AM [facility]       Requested Prescriptions     Pending Prescriptions Disp Refills    atorvastatin (LIPITOR) 20 mg tablet 90 Tablet 3     Sig: Take 1 Tablet by mouth daily. amLODIPine (NORVASC) 10 mg tablet 90 Tablet 3     Sig: Take 1 Tablet by mouth daily. spironolactone (ALDACTONE) 25 mg tablet 180 Tablet 3     Sig: Take 1 Tablet by mouth two (2) times a day.

## 2023-07-25 ENCOUNTER — OFFICE VISIT (OUTPATIENT)
Age: 73
End: 2023-07-25
Payer: MEDICARE

## 2023-07-25 VITALS
HEART RATE: 83 BPM | WEIGHT: 181 LBS | RESPIRATION RATE: 18 BRPM | DIASTOLIC BLOOD PRESSURE: 70 MMHG | OXYGEN SATURATION: 97 % | BODY MASS INDEX: 33.31 KG/M2 | SYSTOLIC BLOOD PRESSURE: 130 MMHG | TEMPERATURE: 98.1 F | HEIGHT: 62 IN

## 2023-07-25 DIAGNOSIS — E78.5 DYSLIPIDEMIA, GOAL LDL BELOW 100: ICD-10-CM

## 2023-07-25 DIAGNOSIS — I10 ESSENTIAL (PRIMARY) HYPERTENSION: ICD-10-CM

## 2023-07-25 DIAGNOSIS — Z12.31 BREAST CANCER SCREENING BY MAMMOGRAM: ICD-10-CM

## 2023-07-25 DIAGNOSIS — N18.31 STAGE 3A CHRONIC KIDNEY DISEASE (HCC): ICD-10-CM

## 2023-07-25 DIAGNOSIS — J45.20 MILD INTERMITTENT ASTHMA, UNSPECIFIED WHETHER COMPLICATED: ICD-10-CM

## 2023-07-25 DIAGNOSIS — I73.9 PERIPHERAL VASCULAR DISEASE, UNSPECIFIED (HCC): ICD-10-CM

## 2023-07-25 DIAGNOSIS — I87.9 VEIN DISORDER: ICD-10-CM

## 2023-07-25 DIAGNOSIS — I10 ESSENTIAL (PRIMARY) HYPERTENSION: Primary | ICD-10-CM

## 2023-07-25 PROCEDURE — 99214 OFFICE O/P EST MOD 30 MIN: CPT | Performed by: INTERNAL MEDICINE

## 2023-07-25 PROCEDURE — 1123F ACP DISCUSS/DSCN MKR DOCD: CPT | Performed by: INTERNAL MEDICINE

## 2023-07-25 PROCEDURE — 3074F SYST BP LT 130 MM HG: CPT | Performed by: INTERNAL MEDICINE

## 2023-07-25 PROCEDURE — 3078F DIAST BP <80 MM HG: CPT | Performed by: INTERNAL MEDICINE

## 2023-07-25 RX ORDER — ATORVASTATIN CALCIUM 20 MG/1
20 TABLET, FILM COATED ORAL DAILY
Qty: 90 TABLET | Refills: 1 | Status: SHIPPED | OUTPATIENT
Start: 2023-07-25

## 2023-07-25 RX ORDER — AMLODIPINE BESYLATE 10 MG/1
10 TABLET ORAL DAILY
Qty: 90 TABLET | Refills: 1 | Status: SHIPPED | OUTPATIENT
Start: 2023-07-25

## 2023-07-25 RX ORDER — FLUTICASONE FUROATE, UMECLIDINIUM BROMIDE AND VILANTEROL TRIFENATATE 100; 62.5; 25 UG/1; UG/1; UG/1
POWDER RESPIRATORY (INHALATION)
COMMUNITY
Start: 2023-07-03

## 2023-07-25 RX ORDER — SPIRONOLACTONE 25 MG/1
25 TABLET ORAL 2 TIMES DAILY
Qty: 180 TABLET | Refills: 1 | Status: SHIPPED | OUTPATIENT
Start: 2023-07-25

## 2023-07-25 SDOH — ECONOMIC STABILITY: HOUSING INSECURITY
IN THE LAST 12 MONTHS, WAS THERE A TIME WHEN YOU DID NOT HAVE A STEADY PLACE TO SLEEP OR SLEPT IN A SHELTER (INCLUDING NOW)?: NO

## 2023-07-25 SDOH — ECONOMIC STABILITY: FOOD INSECURITY: WITHIN THE PAST 12 MONTHS, THE FOOD YOU BOUGHT JUST DIDN'T LAST AND YOU DIDN'T HAVE MONEY TO GET MORE.: NEVER TRUE

## 2023-07-25 SDOH — ECONOMIC STABILITY: INCOME INSECURITY: HOW HARD IS IT FOR YOU TO PAY FOR THE VERY BASICS LIKE FOOD, HOUSING, MEDICAL CARE, AND HEATING?: NOT VERY HARD

## 2023-07-25 SDOH — ECONOMIC STABILITY: FOOD INSECURITY: WITHIN THE PAST 12 MONTHS, YOU WORRIED THAT YOUR FOOD WOULD RUN OUT BEFORE YOU GOT MONEY TO BUY MORE.: NEVER TRUE

## 2023-07-25 ASSESSMENT — ANXIETY QUESTIONNAIRES
4. TROUBLE RELAXING: 0
1. FEELING NERVOUS, ANXIOUS, OR ON EDGE: 0
GAD7 TOTAL SCORE: 0
6. BECOMING EASILY ANNOYED OR IRRITABLE: 0
2. NOT BEING ABLE TO STOP OR CONTROL WORRYING: 0
3. WORRYING TOO MUCH ABOUT DIFFERENT THINGS: 0
7. FEELING AFRAID AS IF SOMETHING AWFUL MIGHT HAPPEN: 0
5. BEING SO RESTLESS THAT IT IS HARD TO SIT STILL: 0

## 2023-07-25 ASSESSMENT — PATIENT HEALTH QUESTIONNAIRE - PHQ9
2. FEELING DOWN, DEPRESSED OR HOPELESS: 0
SUM OF ALL RESPONSES TO PHQ QUESTIONS 1-9: 0
SUM OF ALL RESPONSES TO PHQ QUESTIONS 1-9: 0
1. LITTLE INTEREST OR PLEASURE IN DOING THINGS: 0
SUM OF ALL RESPONSES TO PHQ QUESTIONS 1-9: 0
SUM OF ALL RESPONSES TO PHQ9 QUESTIONS 1 & 2: 0
SUM OF ALL RESPONSES TO PHQ QUESTIONS 1-9: 0

## 2023-07-26 LAB
ALBUMIN SERPL-MCNC: 4.1 G/DL (ref 3.8–4.8)
ALBUMIN/GLOB SERPL: 1 {RATIO} (ref 1.2–2.2)
ALP SERPL-CCNC: 124 IU/L (ref 44–121)
ALT SERPL-CCNC: 22 IU/L (ref 0–32)
AST SERPL-CCNC: 25 IU/L (ref 0–40)
BASOPHILS # BLD AUTO: 0.1 X10E3/UL (ref 0–0.2)
BASOPHILS NFR BLD AUTO: 1 %
BILIRUB SERPL-MCNC: 0.6 MG/DL (ref 0–1.2)
BUN SERPL-MCNC: 15 MG/DL (ref 8–27)
BUN/CREAT SERPL: 13 (ref 12–28)
CALCIUM SERPL-MCNC: 9.4 MG/DL (ref 8.7–10.3)
CHLORIDE SERPL-SCNC: 99 MMOL/L (ref 96–106)
CHOLEST SERPL-MCNC: 162 MG/DL (ref 100–199)
CO2 SERPL-SCNC: 27 MMOL/L (ref 20–29)
CREAT SERPL-MCNC: 1.12 MG/DL (ref 0.57–1)
EGFRCR SERPLBLD CKD-EPI 2021: 52 ML/MIN/1.73
EOSINOPHIL # BLD AUTO: 0.2 X10E3/UL (ref 0–0.4)
EOSINOPHIL NFR BLD AUTO: 3 %
ERYTHROCYTE [DISTWIDTH] IN BLOOD BY AUTOMATED COUNT: 12.9 % (ref 11.7–15.4)
GLOBULIN SER CALC-MCNC: 4.1 G/DL (ref 1.5–4.5)
GLUCOSE SERPL-MCNC: 97 MG/DL (ref 70–99)
HCT VFR BLD AUTO: 41.7 % (ref 34–46.6)
HDLC SERPL-MCNC: 57 MG/DL
HGB BLD-MCNC: 13.9 G/DL (ref 11.1–15.9)
IMM GRANULOCYTES # BLD AUTO: 0.1 X10E3/UL (ref 0–0.1)
IMM GRANULOCYTES NFR BLD AUTO: 1 %
IMP & REVIEW OF LAB RESULTS: NORMAL
LDLC SERPL CALC-MCNC: 92 MG/DL (ref 0–99)
LYMPHOCYTES # BLD AUTO: 2.5 X10E3/UL (ref 0.7–3.1)
LYMPHOCYTES NFR BLD AUTO: 33 %
MCH RBC QN AUTO: 29.8 PG (ref 26.6–33)
MCHC RBC AUTO-ENTMCNC: 33.3 G/DL (ref 31.5–35.7)
MCV RBC AUTO: 89 FL (ref 79–97)
MONOCYTES # BLD AUTO: 1 X10E3/UL (ref 0.1–0.9)
MONOCYTES NFR BLD AUTO: 13 %
NEUTROPHILS # BLD AUTO: 3.7 X10E3/UL (ref 1.4–7)
NEUTROPHILS NFR BLD AUTO: 49 %
PLATELET # BLD AUTO: 282 X10E3/UL (ref 150–450)
POTASSIUM SERPL-SCNC: 4.6 MMOL/L (ref 3.5–5.2)
PROT SERPL-MCNC: 8.2 G/DL (ref 6–8.5)
RBC # BLD AUTO: 4.67 X10E6/UL (ref 3.77–5.28)
REPORT: NORMAL
SODIUM SERPL-SCNC: 138 MMOL/L (ref 134–144)
TRIGL SERPL-MCNC: 67 MG/DL (ref 0–149)
VLDLC SERPL CALC-MCNC: 13 MG/DL (ref 5–40)
WBC # BLD AUTO: 7.5 X10E3/UL (ref 3.4–10.8)

## 2023-07-27 ENCOUNTER — TELEPHONE (OUTPATIENT)
Age: 73
End: 2023-07-27

## 2023-08-04 ENCOUNTER — TELEPHONE (OUTPATIENT)
Age: 73
End: 2023-08-04

## 2023-08-04 NOTE — TELEPHONE ENCOUNTER
Pt states that the pharmacy emailed her about the following medication    Katie Vázquez 100-62.5-25 MCG/ACT AEPB inhaler    Pt states that pharmacy does not have generic for this medication and needs to be contacted by Dr to discuss the script.

## 2023-08-09 ENCOUNTER — TELEPHONE (OUTPATIENT)
Age: 73
End: 2023-08-09

## 2023-09-08 ENCOUNTER — TELEPHONE (OUTPATIENT)
Age: 73
End: 2023-09-08

## 2023-09-08 NOTE — TELEPHONE ENCOUNTER
----- Message from Ewa Neff sent at 9/5/2023  9:40 AM EDT -----  Subject: Message to Provider    QUESTIONS  Information for Provider? [address] called because has sent a   medical records request on Sept 1st. She would like to verify that it was   received. Would like a call back. ---------------------------------------------------------------------------  --------------  Sofy Frazier [employer]  [phone]; OK to leave message on voicemail  ---------------------------------------------------------------------------  --------------  SCRIPT ANSWERS  Relationship to Patient? Covered Entity  Covered Entity Type? Health Insurance? Representative Name?  Monster Nassar

## 2023-12-14 ENCOUNTER — APPOINTMENT (OUTPATIENT)
Facility: HOSPITAL | Age: 73
End: 2023-12-14
Payer: MEDICARE

## 2023-12-14 ENCOUNTER — HOSPITAL ENCOUNTER (EMERGENCY)
Facility: HOSPITAL | Age: 73
Discharge: HOME | End: 2023-12-14
Attending: STUDENT IN AN ORGANIZED HEALTH CARE EDUCATION/TRAINING PROGRAM
Payer: MEDICARE

## 2023-12-14 VITALS
RESPIRATION RATE: 12 BRPM | SYSTOLIC BLOOD PRESSURE: 117 MMHG | OXYGEN SATURATION: 99 % | HEIGHT: 62 IN | WEIGHT: 188.49 LBS | TEMPERATURE: 98.8 F | DIASTOLIC BLOOD PRESSURE: 65 MMHG | BODY MASS INDEX: 34.69 KG/M2 | HEART RATE: 87 BPM

## 2023-12-14 DIAGNOSIS — S09.90XA INJURY OF HEAD, INITIAL ENCOUNTER: Primary | ICD-10-CM

## 2023-12-14 DIAGNOSIS — S01.01XA LACERATION OF SCALP, INITIAL ENCOUNTER: ICD-10-CM

## 2023-12-14 PROCEDURE — 6360000002 HC RX W HCPCS: Performed by: STUDENT IN AN ORGANIZED HEALTH CARE EDUCATION/TRAINING PROGRAM

## 2023-12-14 PROCEDURE — 90714 TD VACC NO PRESV 7 YRS+ IM: CPT | Performed by: STUDENT IN AN ORGANIZED HEALTH CARE EDUCATION/TRAINING PROGRAM

## 2023-12-14 PROCEDURE — 99284 EMERGENCY DEPT VISIT MOD MDM: CPT

## 2023-12-14 PROCEDURE — 12013 RPR F/E/E/N/L/M 2.6-5.0 CM: CPT

## 2023-12-14 PROCEDURE — 90471 IMMUNIZATION ADMIN: CPT | Performed by: STUDENT IN AN ORGANIZED HEALTH CARE EDUCATION/TRAINING PROGRAM

## 2023-12-14 PROCEDURE — 70450 CT HEAD/BRAIN W/O DYE: CPT

## 2023-12-14 RX ORDER — TETANUS AND DIPHTHERIA TOXOIDS ADSORBED 2; 2 [LF]/.5ML; [LF]/.5ML
0.5 INJECTION INTRAMUSCULAR ONCE
Status: COMPLETED | OUTPATIENT
Start: 2023-12-14 | End: 2023-12-14

## 2023-12-14 RX ADMIN — TETANUS AND DIPHTHERIA TOXOIDS ADSORBED 0.5 ML: 2; 2 INJECTION INTRAMUSCULAR at 16:26

## 2023-12-14 ASSESSMENT — PAIN DESCRIPTION - LOCATION: LOCATION: HEAD

## 2023-12-14 ASSESSMENT — PAIN - FUNCTIONAL ASSESSMENT: PAIN_FUNCTIONAL_ASSESSMENT: 0-10

## 2023-12-14 ASSESSMENT — PAIN DESCRIPTION - FREQUENCY: FREQUENCY: CONTINUOUS

## 2023-12-14 ASSESSMENT — PAIN SCALES - GENERAL: PAINLEVEL_OUTOF10: 5

## 2023-12-14 ASSESSMENT — PAIN DESCRIPTION - DESCRIPTORS: DESCRIPTORS: ACHING

## 2023-12-14 NOTE — ED NOTES
The patient was discharged home by Dr Izabela Kong in stable condition. The patient is alert and oriented, in no respiratory distress and discharge vital signs obtained. The patient's diagnosis, condition and treatment were explained. The patient expressed understanding. No prescriptions given/e-scribed to pharmacy. No work/school note given. A discharge plan has been developed. A  was not involved in the process. Aftercare instructions were given. Pt ambulatory out of the ED with family.        Latoya Parsons RN  12/14/23 [phone]

## 2023-12-14 NOTE — DISCHARGE INSTRUCTIONS
You presented to ED after striking her head and received a laceration. Laceration was repaired with glue at your request.  If glue breaks off and and starts bleeding. Apply direct pressure. Follow-up with your PCP.

## 2024-02-13 ENCOUNTER — TRANSCRIBE ORDERS (OUTPATIENT)
Facility: HOSPITAL | Age: 74
End: 2024-02-13

## 2024-02-13 DIAGNOSIS — Z12.31 VISIT FOR SCREENING MAMMOGRAM: Primary | ICD-10-CM

## 2024-02-23 ENCOUNTER — HOSPITAL ENCOUNTER (OUTPATIENT)
Facility: HOSPITAL | Age: 74
Discharge: HOME | End: 2024-02-26

## 2024-02-23 ENCOUNTER — TELEPHONE (OUTPATIENT)
Age: 74
End: 2024-02-23

## 2024-02-23 DIAGNOSIS — Z12.31 VISIT FOR SCREENING MAMMOGRAM: ICD-10-CM

## 2024-02-23 NOTE — TELEPHONE ENCOUNTER
Lety called to relay message for patient.  She was seen today for her mammogram and while there spoke to the tech about feeling a lump in her left breast. Mammogram tech also felt lump.  Patient only had a routine mammogram and will need a diagnostic mammogram done for the lump.  Patient wondering if she needs to come in to see a provider for a breast exam to have the order put in or can new orders be placed with out a visit. Please advise.    Alexey PERDOMO LPN

## 2024-02-26 ENCOUNTER — TELEPHONE (OUTPATIENT)
Age: 74
End: 2024-02-26

## 2024-02-26 NOTE — TELEPHONE ENCOUNTER
Pt's chart shows screening was canceled. Pt may need new order.     Regarding  Lety called to relay message for patient.  She was seen today for her mammogram and while there spoke to the tech about feeling a lump in her left breast. Mammogram tech also felt lump.  Patient only had a routine mammogram and will need a diagnostic mammogram done for the lump.  Patient wondering if she needs to come in to see a provider for a breast exam to have the order put in or can new orders be placed with out a visit. Please advise.     Alexey Bland LPN

## 2024-03-06 ENCOUNTER — OFFICE VISIT (OUTPATIENT)
Age: 74
End: 2024-03-06
Payer: MEDICARE

## 2024-03-06 VITALS
OXYGEN SATURATION: 97 % | RESPIRATION RATE: 16 BRPM | DIASTOLIC BLOOD PRESSURE: 80 MMHG | WEIGHT: 190.8 LBS | SYSTOLIC BLOOD PRESSURE: 132 MMHG | TEMPERATURE: 97.2 F | HEIGHT: 62 IN | HEART RATE: 76 BPM | BODY MASS INDEX: 35.11 KG/M2

## 2024-03-06 DIAGNOSIS — N63.21 MASS OF UPPER OUTER QUADRANT OF LEFT BREAST: Primary | ICD-10-CM

## 2024-03-06 PROCEDURE — 3075F SYST BP GE 130 - 139MM HG: CPT | Performed by: INTERNAL MEDICINE

## 2024-03-06 PROCEDURE — 99213 OFFICE O/P EST LOW 20 MIN: CPT | Performed by: INTERNAL MEDICINE

## 2024-03-06 PROCEDURE — 3079F DIAST BP 80-89 MM HG: CPT | Performed by: INTERNAL MEDICINE

## 2024-03-06 PROCEDURE — 1123F ACP DISCUSS/DSCN MKR DOCD: CPT | Performed by: INTERNAL MEDICINE

## 2024-03-06 ASSESSMENT — PATIENT HEALTH QUESTIONNAIRE - PHQ9
SUM OF ALL RESPONSES TO PHQ QUESTIONS 1-9: 0
SUM OF ALL RESPONSES TO PHQ9 QUESTIONS 1 & 2: 0
SUM OF ALL RESPONSES TO PHQ QUESTIONS 1-9: 0
1. LITTLE INTEREST OR PLEASURE IN DOING THINGS: 0
2. FEELING DOWN, DEPRESSED OR HOPELESS: 0

## 2024-03-06 ASSESSMENT — ENCOUNTER SYMPTOMS
SHORTNESS OF BREATH: 0
COUGH: 0

## 2024-03-06 NOTE — PROGRESS NOTES
I have reviewed all needed documentation in preparation for visit. Verified patient by name and date of birth    Chief Complaint   Patient presents with    lump in breast     Lump in left breast       \"Have you been to the ER, urgent care clinic since your last visit?  Hospitalized since your last visit?\"    Yes [place], 12/2024, head blleding from injury    “Have you seen or consulted any other health care providers outside of [facility] since your last visit?”    NO    “Have you had a colorectal cancer screening such as a colonoscopy/FIT/Cologuard?    NO           Vitals:    03/06/24 1141   BP: (!) 153/87   Site: Right Upper Arm   Position: Sitting   Cuff Size: Medium Adult   Pulse: 76   Resp: 16   Temp: 97.2 °F (36.2 °C)   TempSrc: Temporal   SpO2: 97%   Weight: 86.5 kg (190 lb 12.8 oz)   Height: 1.575 m (5' 2\")       Health Maintenance Due   Topic Date Due    Pneumococcal 65+ years Vaccine (1 - PCV) Never done    Shingles vaccine (1 of 2) Never done    Respiratory Syncytial Virus (RSV) Pregnant or age 60 yrs+ (1 - 1-dose 60+ series) Never done    Colorectal Cancer Screen  07/31/2023    Flu vaccine (1) Never done    COVID-19 Vaccine (4 - 2023-24 season) 09/01/2023    Annual Wellness Visit (Medicare Advantage)  Never done       Rosie Bland LPN

## 2024-03-06 NOTE — PROGRESS NOTES
Erica Holly is a 73 y.o. female  Chief Complaint   Patient presents with    lump in breast     Lump in left breast, may need colorectal screening       Vitals:    03/06/24 1158   BP: 132/80   Pulse:    Resp:    Temp:    SpO2:           HPI  Ms. Holly is a 73 y.o. who noticed a lump on her left breast 2 weeks ago, felt a lump while preparing to do screening mammogram, she knows she has a cyst in the past. She doesn't have any pain, discharge from the breast. She didn't loose weight and no other symptoms to note.     She has an aunt who has a breast cancer at younger age, but doesn't remember her age, no other family history of cancer..       HTN: home blood pressure is ok around 130/80, compliant with meds, repeat BP is back to normal.     .  Past Medical History:   Diagnosis Date    Hypertension 6/15/2010            ROS  Review of Systems   Constitutional:  Negative for fever.   Respiratory:  Negative for cough and shortness of breath.    Cardiovascular:  Negative for chest pain and palpitations.   Neurological:  Negative for headaches.   Psychiatric/Behavioral:  Negative for dysphoric mood.            EXAM  Physical Exam  Vitals and nursing note reviewed.   HENT:      Head: Normocephalic and atraumatic.   Cardiovascular:      Rate and Rhythm: Normal rate and regular rhythm.      Pulses: Normal pulses.      Heart sounds: Normal heart sounds. No murmur heard.  Pulmonary:      Effort: Pulmonary effort is normal. No respiratory distress.      Breath sounds: Normal breath sounds.   Chest:          Comments: Round to oval 2-3 cm diameter mobile mass, no fluctuance or tenderness   Musculoskeletal:      Right lower leg: No edema.      Left lower leg: No edema.   Neurological:      Mental Status: She is alert.   Psychiatric:         Mood and Affect: Mood normal.         Behavior: Behavior normal.         Thought Content: Thought content normal.         Judgment: Judgment normal.         Health Maintenance Due   Topic

## 2024-03-28 ENCOUNTER — HOSPITAL ENCOUNTER (OUTPATIENT)
Facility: HOSPITAL | Age: 74
Discharge: HOME | End: 2024-03-28
Attending: INTERNAL MEDICINE
Payer: MEDICARE

## 2024-03-28 ENCOUNTER — HOSPITAL ENCOUNTER (OUTPATIENT)
Facility: HOSPITAL | Age: 74
End: 2024-03-28
Attending: INTERNAL MEDICINE
Payer: MEDICARE

## 2024-03-28 VITALS — WEIGHT: 190 LBS | BODY MASS INDEX: 34.96 KG/M2 | HEIGHT: 62 IN

## 2024-03-28 DIAGNOSIS — N63.21 MASS OF UPPER OUTER QUADRANT OF LEFT BREAST: ICD-10-CM

## 2024-03-28 PROCEDURE — G0279 TOMOSYNTHESIS, MAMMO: HCPCS

## 2024-03-28 PROCEDURE — 76642 ULTRASOUND BREAST LIMITED: CPT

## 2024-03-29 ENCOUNTER — TELEPHONE (OUTPATIENT)
Age: 74
End: 2024-03-29

## 2024-03-29 NOTE — TELEPHONE ENCOUNTER
Per telephone call. Patient Patient must bring order from referring physician on day of Apirl 8 for her Biopsy at [address]  [address]  [place] [address]  Dept directions for [facility] RADIOLOGY:  [address]  [address]  [place], [place] [address] [phone]

## 2024-04-02 ENCOUNTER — OFFICE VISIT (OUTPATIENT)
Age: 74
End: 2024-04-02
Payer: MEDICARE

## 2024-04-02 VITALS
DIASTOLIC BLOOD PRESSURE: 80 MMHG | HEART RATE: 70 BPM | OXYGEN SATURATION: 98 % | SYSTOLIC BLOOD PRESSURE: 138 MMHG | RESPIRATION RATE: 14 BRPM | HEIGHT: 62 IN | BODY MASS INDEX: 35.33 KG/M2 | WEIGHT: 192 LBS | TEMPERATURE: 97.4 F

## 2024-04-02 DIAGNOSIS — J45.20 MILD INTERMITTENT ASTHMA, UNSPECIFIED WHETHER COMPLICATED: ICD-10-CM

## 2024-04-02 DIAGNOSIS — Z01.818 PREOP EXAM FOR INTERNAL MEDICINE: ICD-10-CM

## 2024-04-02 DIAGNOSIS — I10 ESSENTIAL (PRIMARY) HYPERTENSION: ICD-10-CM

## 2024-04-02 DIAGNOSIS — N63.0 BREAST MASS IN FEMALE: Primary | ICD-10-CM

## 2024-04-02 PROCEDURE — 3075F SYST BP GE 130 - 139MM HG: CPT | Performed by: INTERNAL MEDICINE

## 2024-04-02 PROCEDURE — 1123F ACP DISCUSS/DSCN MKR DOCD: CPT | Performed by: INTERNAL MEDICINE

## 2024-04-02 PROCEDURE — 99214 OFFICE O/P EST MOD 30 MIN: CPT | Performed by: INTERNAL MEDICINE

## 2024-04-02 PROCEDURE — 3079F DIAST BP 80-89 MM HG: CPT | Performed by: INTERNAL MEDICINE

## 2024-04-02 NOTE — PROGRESS NOTES
Chief Complaint   Patient presents with    Pre-op Exam     Patient states this is a regular physical - found a lump in her mammo      Erica Holly was referred for evaluation by:Self Referred for Pre- Op Evaluation.  Please see encounter details and orders for consultative summary.    Type of surgery : breast biopsy  Surgery site : guerrero  Anesthesia type: local  Date of procedure:  next weekl    Allergies:   Allergies   Allergen Reactions    Ace Inhibitors Cough    Asa-Apap-Salicyl-Caff      Other reaction(s): Not Reported This Time    Ibuprofen Hives and Swelling     Latex allergy: no  Prior reactions to anesthesia:  None    Functional status:  she is able to ambulate up 2 flights of step withno shortness of breath, chest pain  Prior cardiac evaluation:   no    Current Outpatient Medications   Medication Sig    TRELEGY ELLIPTA 100-62.5-25 MCG/ACT AEPB inhaler     spironolactone (ALDACTONE) 25 MG tablet Take 1 tablet by mouth 2 times daily    atorvastatin (LIPITOR) 20 MG tablet Take 1 tablet by mouth daily    amLODIPine (NORVASC) 10 MG tablet Take 1 tablet by mouth daily    aspirin 81 MG chewable tablet Take 1 tablet by mouth daily     No current facility-administered medications for this visit.     Past Medical History:   Diagnosis Date    Hypertension 6/15/2010     Past Surgical History:   Procedure Laterality Date    BREAST BIOPSY  1990's    neg     Social History     Tobacco Use    Smoking status: Never    Smokeless tobacco: Never   Vaping Use    Vaping Use: Never used   Substance Use Topics    Alcohol use: No    Drug use: No       Blood pressure 138/80, pulse 70, temperature 97.4 °F (36.3 °C), temperature source Temporal, resp. rate 14, height 1.575 m (5' 2\"), weight 87.1 kg (192 lb), SpO2 98 %.  no apparent distress  Wt Readings from Last 3 Encounters:   04/02/24 87.1 kg (192 lb)   03/28/24 86.2 kg (190 lb)   03/06/24 86.5 kg (190 lb 12.8 oz)     Vitals:    04/02/24 1121 04/02/24 1205   BP: (!) 145/81

## 2024-04-03 LAB
ALBUMIN SERPL-MCNC: 4.1 G/DL (ref 3.8–4.8)
ALBUMIN/GLOB SERPL: 1.2 {RATIO} (ref 1.2–2.2)
ALP SERPL-CCNC: 113 IU/L (ref 44–121)
ALT SERPL-CCNC: 15 IU/L (ref 0–32)
AST SERPL-CCNC: 19 IU/L (ref 0–40)
BASOPHILS # BLD AUTO: 0 X10E3/UL (ref 0–0.2)
BASOPHILS NFR BLD AUTO: 1 %
BILIRUB SERPL-MCNC: 0.5 MG/DL (ref 0–1.2)
BUN SERPL-MCNC: 13 MG/DL (ref 8–27)
BUN/CREAT SERPL: 11 (ref 12–28)
CALCIUM SERPL-MCNC: 9.3 MG/DL (ref 8.7–10.3)
CHLORIDE SERPL-SCNC: 101 MMOL/L (ref 96–106)
CHOLEST SERPL-MCNC: 154 MG/DL (ref 100–199)
CO2 SERPL-SCNC: 26 MMOL/L (ref 20–29)
CREAT SERPL-MCNC: 1.14 MG/DL (ref 0.57–1)
EGFRCR SERPLBLD CKD-EPI 2021: 51 ML/MIN/1.73
EOSINOPHIL # BLD AUTO: 0.1 X10E3/UL (ref 0–0.4)
EOSINOPHIL NFR BLD AUTO: 2 %
ERYTHROCYTE [DISTWIDTH] IN BLOOD BY AUTOMATED COUNT: 13.1 % (ref 11.7–15.4)
GLOBULIN SER CALC-MCNC: 3.5 G/DL (ref 1.5–4.5)
GLUCOSE SERPL-MCNC: 90 MG/DL (ref 70–99)
HCT VFR BLD AUTO: 40.2 % (ref 34–46.6)
HDLC SERPL-MCNC: 48 MG/DL
HGB BLD-MCNC: 13.1 G/DL (ref 11.1–15.9)
IMM GRANULOCYTES # BLD AUTO: 0 X10E3/UL (ref 0–0.1)
IMM GRANULOCYTES NFR BLD AUTO: 0 %
IMP & REVIEW OF LAB RESULTS: NORMAL
LDLC SERPL CALC-MCNC: 90 MG/DL (ref 0–99)
LYMPHOCYTES # BLD AUTO: 2.3 X10E3/UL (ref 0.7–3.1)
LYMPHOCYTES NFR BLD AUTO: 33 %
MCH RBC QN AUTO: 29 PG (ref 26.6–33)
MCHC RBC AUTO-ENTMCNC: 32.6 G/DL (ref 31.5–35.7)
MCV RBC AUTO: 89 FL (ref 79–97)
MONOCYTES # BLD AUTO: 0.7 X10E3/UL (ref 0.1–0.9)
MONOCYTES NFR BLD AUTO: 10 %
NEUTROPHILS # BLD AUTO: 3.8 X10E3/UL (ref 1.4–7)
NEUTROPHILS NFR BLD AUTO: 54 %
PLATELET # BLD AUTO: 304 X10E3/UL (ref 150–450)
POTASSIUM SERPL-SCNC: 4.4 MMOL/L (ref 3.5–5.2)
PROT SERPL-MCNC: 7.6 G/DL (ref 6–8.5)
RBC # BLD AUTO: 4.52 X10E6/UL (ref 3.77–5.28)
REPORT: NORMAL
REPORT: NORMAL
SODIUM SERPL-SCNC: 140 MMOL/L (ref 134–144)
TRIGL SERPL-MCNC: 87 MG/DL (ref 0–149)
VLDLC SERPL CALC-MCNC: 16 MG/DL (ref 5–40)
WBC # BLD AUTO: 7 X10E3/UL (ref 3.4–10.8)

## 2024-04-08 ENCOUNTER — TELEPHONE (OUTPATIENT)
Age: 74
End: 2024-04-08

## 2024-04-08 ENCOUNTER — HOSPITAL ENCOUNTER (OUTPATIENT)
Facility: HOSPITAL | Age: 74
Discharge: HOME | End: 2024-04-11
Payer: MEDICARE

## 2024-04-08 DIAGNOSIS — N63.22 MASS OF UPPER INNER QUADRANT OF LEFT BREAST: ICD-10-CM

## 2024-04-08 PROCEDURE — 77065 DX MAMMO INCL CAD UNI: CPT

## 2024-04-08 PROCEDURE — 19083 BX BREAST 1ST LESION US IMAG: CPT

## 2024-04-08 PROCEDURE — 88342 IMHCHEM/IMCYTCHM 1ST ANTB: CPT

## 2024-04-08 PROCEDURE — 88360 TUMOR IMMUNOHISTOCHEM/MANUAL: CPT

## 2024-04-08 PROCEDURE — 88305 TISSUE EXAM BY PATHOLOGIST: CPT

## 2024-04-08 PROCEDURE — 88341 IMHCHEM/IMCYTCHM EA ADD ANTB: CPT

## 2024-04-08 NOTE — PROGRESS NOTES
1:30 p.m.  Patient received for a left breast ultrasound-guided biopsy/breast clip.  Procedure explained to patient as well as risks associated with procedure.  Discharge instructions briefly discussed.  Patient expects to be contacted by phone with pathology results.    2:15 p.m.  Time-out performed with all participants present.    3:10 p.m.  Patient tolerated procedure well with minimal bleeding.  Pressure held to biopsy site for 5 minutes; a dressing was applied to site (steri-strip, gauze, and tegaderm).  Patient then sent for a post biopsy mammogram.  Discharge instructions reviewed with patient and a copy given to patient.  Dressing dry and intact on discharge; an ice pack was applied over dressing to site.  Patient expects to be contacted by phone with pathology results.

## 2024-04-08 NOTE — RESULT ENCOUNTER NOTE
Called and spoke with pt.  Verified identity x2.  Relayed Dr Salazar's note that pt's labs are stable and no change is advised at this time  Pt verbalized understanding    Rosie Bland LPN

## 2024-04-08 NOTE — TELEPHONE ENCOUNTER
Called and spoke with pt.  Verified identity x2.    While on the phone with pt to relay lab result, pt reported she had a left breast biopsy today  Pt states she will call with the results when she gets them    Rosie Bland LPN

## 2024-04-12 NOTE — PROGRESS NOTES
8:58 a.m.  Dr. Kevin Orantes spoke with patient earlier regarding her pathology results.  I spoke to patient to see if she had any follow-up questions.  Also, let her know about her appointment with Dr. Antony Hazel with [facility] at [facility], on May 1st at 4:30 p.m. .  Patient states that she has had no issues with her biopsy site.  Pathology results were fax'd to Dr. Rickey Salazar.

## 2024-04-22 ENCOUNTER — TELEPHONE (OUTPATIENT)
Age: 74
End: 2024-04-22

## 2024-04-22 NOTE — TELEPHONE ENCOUNTER
Patient called asking if she needed to postpone her dental work prior to her appointment and I informed her she did not need to postpone. Patient had also called the dental office who advised her to postpone until a later date. I advised that she can do whatever makes her feel comfortable either way.

## 2024-04-23 ENCOUNTER — TELEPHONE (OUTPATIENT)
Facility: HOSPITAL | Age: 74
End: 2024-04-23

## 2024-04-23 DIAGNOSIS — C50.919 MALIGNANT NEOPLASM OF BREAST IN FEMALE, ESTROGEN RECEPTOR NEGATIVE, UNSPECIFIED LATERALITY, UNSPECIFIED SITE OF BREAST (HCC): Primary | ICD-10-CM

## 2024-04-23 DIAGNOSIS — Z17.1 MALIGNANT NEOPLASM OF BREAST IN FEMALE, ESTROGEN RECEPTOR NEGATIVE, UNSPECIFIED LATERALITY, UNSPECIFIED SITE OF BREAST (HCC): Primary | ICD-10-CM

## 2024-04-23 DIAGNOSIS — Z12.39 BREAST CANCER SCREENING, HIGH RISK PATIENT: ICD-10-CM

## 2024-04-23 DIAGNOSIS — Z17.0 MALIGNANT NEOPLASM OF LEFT BREAST IN FEMALE, ESTROGEN RECEPTOR POSITIVE, UNSPECIFIED SITE OF BREAST (HCC): Primary | ICD-10-CM

## 2024-04-23 DIAGNOSIS — C50.912 MALIGNANT NEOPLASM OF LEFT BREAST IN FEMALE, ESTROGEN RECEPTOR POSITIVE, UNSPECIFIED SITE OF BREAST (HCC): Primary | ICD-10-CM

## 2024-04-23 NOTE — TELEPHONE ENCOUNTER
[facility]  Breast Navigator Encounter    Name:    Erica Holly  Age:    73 y.o.  Diagnosis: IDC    Interdisciplinary Team:  Med-Onc:      Surg-Onc:   Yasemin   Rad-Onc:      Plastics:      :      Nurse Navigator:  Angely ZAIDI-RN      Encounter type:  []Patient Initiated  []Navigator Follow-up []Pre-op  []Post-op  []Check-in Prior to First Treatment []Treatment Modality Change  [x]Initial Navigator Encounter []Other:       Narrative:    Reached out to patient for initial navigator contact.  I explained what happens at the first consultation - an exam by the physician, a possible ultrasound, then complete discussion of surgical options and other treatment that may be considered.  I encouraged the patient to bring someone with her to this appointment, They will be bringing their  and their daughter.        Discussed that a breast MRI has been ordered by Dr. Hazel, and is the next step in her work-up.  I told her I will reach out to the imaging navigator to try to get this scheduled prior to her appointment.   I explained the MRI procedure to her. With the help of the imaging navigator I was able to get this MRI scheduled for [place] [address] on 4/24. We discussed all instructions and I provided her with the address as well.                     Referrals/Handouts:            Angely ZAIDI, RN.  Breast Cancer Navigator   [address]  [address]  Office: [phone]  Cell: [phone]  F: [phone]  [email or web address]  Good Help to Those in Need®

## 2024-04-24 ENCOUNTER — HOSPITAL ENCOUNTER (OUTPATIENT)
Age: 74
Discharge: HOME | End: 2024-04-27
Payer: MEDICARE

## 2024-04-24 DIAGNOSIS — Z12.39 BREAST CANCER SCREENING, HIGH RISK PATIENT: ICD-10-CM

## 2024-04-24 DIAGNOSIS — Z17.0 MALIGNANT NEOPLASM OF LEFT BREAST IN FEMALE, ESTROGEN RECEPTOR POSITIVE, UNSPECIFIED SITE OF BREAST (HCC): ICD-10-CM

## 2024-04-24 DIAGNOSIS — C50.912 MALIGNANT NEOPLASM OF LEFT BREAST IN FEMALE, ESTROGEN RECEPTOR POSITIVE, UNSPECIFIED SITE OF BREAST (HCC): ICD-10-CM

## 2024-04-24 PROCEDURE — C8908 MRI W/O FOL W/CONT, BREAST,: HCPCS

## 2024-04-24 PROCEDURE — 6360000004 HC RX CONTRAST MEDICATION: Performed by: RADIOLOGY

## 2024-04-24 PROCEDURE — A9585 GADOBUTROL INJECTION: HCPCS | Performed by: RADIOLOGY

## 2024-04-24 RX ORDER — GADOBUTROL 604.72 MG/ML
8.5 INJECTION INTRAVENOUS
Status: COMPLETED | OUTPATIENT
Start: 2024-04-24 | End: 2024-04-24

## 2024-04-24 RX ADMIN — GADOBUTROL 8.5 ML: 604.72 INJECTION INTRAVENOUS at 13:06

## 2024-05-01 ENCOUNTER — CLINICAL DOCUMENTATION (OUTPATIENT)
Age: 74
End: 2024-05-01

## 2024-05-01 ENCOUNTER — TELEPHONE (OUTPATIENT)
Age: 74
End: 2024-05-01

## 2024-05-01 ENCOUNTER — OFFICE VISIT (OUTPATIENT)
Age: 74
End: 2024-05-01
Payer: MEDICARE

## 2024-05-01 ENCOUNTER — SOCIAL WORK (OUTPATIENT)
Age: 74
End: 2024-05-01

## 2024-05-01 ENCOUNTER — CLINICAL DOCUMENTATION (OUTPATIENT)
Facility: HOSPITAL | Age: 74
End: 2024-05-01

## 2024-05-01 VITALS
BODY MASS INDEX: 34.96 KG/M2 | HEIGHT: 62 IN | DIASTOLIC BLOOD PRESSURE: 80 MMHG | WEIGHT: 190 LBS | HEART RATE: 84 BPM | SYSTOLIC BLOOD PRESSURE: 151 MMHG

## 2024-05-01 DIAGNOSIS — R92.8 ABNORMAL MRI, BREAST: Primary | ICD-10-CM

## 2024-05-01 DIAGNOSIS — C50.912 INVASIVE DUCTAL CARCINOMA OF BREAST, LEFT (HCC): Primary | ICD-10-CM

## 2024-05-01 PROCEDURE — 3079F DIAST BP 80-89 MM HG: CPT | Performed by: SURGERY

## 2024-05-01 PROCEDURE — 99205 OFFICE O/P NEW HI 60 MIN: CPT | Performed by: SURGERY

## 2024-05-01 PROCEDURE — 93702 BIS XTRACELL FLUID ANALYSIS: CPT | Performed by: SURGERY

## 2024-05-01 PROCEDURE — 3077F SYST BP >= 140 MM HG: CPT | Performed by: SURGERY

## 2024-05-01 PROCEDURE — 76642 ULTRASOUND BREAST LIMITED: CPT | Performed by: SURGERY

## 2024-05-01 PROCEDURE — 1123F ACP DISCUSS/DSCN MKR DOCD: CPT | Performed by: SURGERY

## 2024-05-01 NOTE — PROGRESS NOTES
[facility] Social Work   Psychosocial Assessment    [] Med-Onc [facility] [] Med-Onc [facility] [] Med-Onc [facility] [] Rad-Onc [facility] [] Rad-Onc [facility] [] Rad-Onc [facility] [] Rad-Onc [facility] [x] [facility]       Patient: Erica Holly    Reason for Assessment:    [] Social Work Referral  [x] Initial Assessment  [x] New Diagnosis  [] Other:     Advance Care Planning:  [] AMD Discussed and Completed  [] AMD Reviewed Verbally [] AMD Copy Provided  [x] Conversation Deferred [] Conversation Declined      Sources of Information:    [x] Patient  [x] Family  [x] Staff  [x] Medical Record    Mental Status:    [x] Alert  [] Lethargic  [] Unresponsive  [] Unable to assess   Oriented to: [x] Person  [x] Place  [x] Time  [x] Situation      Relationship Status:  [] Single  [x]   [] Significant Other/Life Partner  []   []   []     Living Circumstances:  [] Lives Alone  [x] Family/Significant Other in Household  [] Roommates  [] Children in the Home  [] Paid Caregivers  [] Assisted Living Facility/Group Home  [] Skilled Nursing Facility  [] Homeless  [] Incarcerated  [] Environmental/Care Concerns  [] Other:    Employment Status:   [] Employed Full-time  [] Employed Part-time  [] Homemaker  [x] Retired  [] Short-Term Disability  [] Long-Term Disability  [] Unemployed  [] SSI  [] SSDI  [] Self-Employment    Transportation Resources:   [x] Self  [x] Family/Friends  [] Insurance  [] Community Programs  [] Other:    Barriers to Learning:    [] Language  [] Developmental  [] Cognitive  [] Altered Mental Status  [] Visual/Hearing Impairment  [] Unable to Read/Write  [] Motivational  [] Challenges Understanding Medical Jargon [x] No Barriers Identified      Financial/Legal Concerns:    [] Uninsured  [] Limited Income/Resources  [] Non-Citizen  [] Food Insecurity [] No Concerns Identified   [x] Other: Concerned about the cost of surgery and chemotherapy    Mosque/Spiritual/Existential:   Does patient have any spiritual

## 2024-05-01 NOTE — TELEPHONE ENCOUNTER
MRI bx right breast    2nd look US and bx left breast v MRI guided bx    US guided bx left axillary LN

## 2024-05-01 NOTE — PROGRESS NOTES
HISTORY OF PRESENT ILLNESS  Erica Holly is a 73 y.o. female.    HPI   NEW patient consult referred by Dr. Rickey Salazar for LEFT Breast IDC.   Patient is here today with her  and oldest daughter.    4/8/24: LEFT breast, 1 o'clock position; needle bx: IDC with squamous/ metaplastic features and necrosis, histologic grade 3. Size of largest viable invasive tumor focus in one tissue core: 3.5 mm. ER-/KS-/HER2-, Ki-67 80%    Family History:  None        MRI Result (most recent):  MRI BREAST BILATERAL W WO CONTRAST 04/24/2024     Narrative  INDICATION: Left invasive ductal carcinoma.     COMPARISON: Prior breast imaging from 2022 and 2024.     TECHNIQUE:  Multisequence, multiplanar, bilateral breast MRI was performed in prone position  using a dedicated breast coil. Images were obtained without contrast and dynamic  postcontrast images were obtained in multiple phases. 8.5 mL IV gadobutrol  (Gadavist) was administered. Subtraction images were reconstructed. Postcontrast  images were reviewed with dedicated kinetic analysis software.     FINDINGS:  There is moderate background parenchymal enhancement and heterogeneous  fibroglandular tissue.  In the left breast at 3:00, a 28 x 30 x 27 mm, a heterogeneously enhancing mass  with washout kinetics corresponds to the known breast carcinoma. BI-RADS 6.  A left axillary lymph node shows focal cortical thickening anteriorly, measuring  5-6 mm. Other left axillary lymph nodes are less than 5 mm in overall size and  are similar size to visible right axillary lymph nodes. No internal mammary  chain lymphadenopathy.  At 6:00 in the posterior third of the left breast, a heterogeneously enhancing  mass with predominantly plateau kinetics measures 18 x 9 x 17 mm.  Heterogeneously T2 hyperintense. BI-RADS 4.  In the posterior third of the right upper inner quadrant, an area of  non-mass-like enhancement measures 11 x 17 x 9 mm and shows predominantly  persistent kinetics. T2

## 2024-05-01 NOTE — PROGRESS NOTES
HISTORY OF PRESENT ILLNESS  Erica Holly is a 73 y.o. female     HPI NEW patient consult referred by Dr. Rickey Salazar for LEFT Breast IDC.   Patient is here today with her  and oldest daughter.     4/8/24: LEFT breast, 1 o'clock position; needle bx: IDC with squamous/ metaplastic features and necrosis, histologic grade 3. Size of largest viable invasive tumor focus in one tissue core: 3.5 mm. ER-/IL-/HER2-, Ki-67 80%     Family History:  None      MRI Result (most recent):  MRI BREAST BILATERAL W WO CONTRAST 04/24/2024    Narrative  INDICATION: Left invasive ductal carcinoma.    COMPARISON: Prior breast imaging from 2022 and 2024.    TECHNIQUE:  Multisequence, multiplanar, bilateral breast MRI was performed in prone position  using a dedicated breast coil. Images were obtained without contrast and dynamic  postcontrast images were obtained in multiple phases. 8.5 mL IV gadobutrol  (Gadavist) was administered. Subtraction images were reconstructed. Postcontrast  images were reviewed with dedicated kinetic analysis software.    FINDINGS:  There is moderate background parenchymal enhancement and heterogeneous  fibroglandular tissue.  In the left breast at 3:00, a 28 x 30 x 27 mm, a heterogeneously enhancing mass  with washout kinetics corresponds to the known breast carcinoma. BI-RADS 6.  A left axillary lymph node shows focal cortical thickening anteriorly, measuring  5-6 mm. Other left axillary lymph nodes are less than 5 mm in overall size and  are similar size to visible right axillary lymph nodes. No internal mammary  chain lymphadenopathy.  At 6:00 in the posterior third of the left breast, a heterogeneously enhancing  mass with predominantly plateau kinetics measures 18 x 9 x 17 mm.  Heterogeneously T2 hyperintense. BI-RADS 4.  In the posterior third of the right upper inner quadrant, an area of  non-mass-like enhancement measures 11 x 17 x 9 mm and shows predominantly  persistent kinetics. T2

## 2024-05-01 NOTE — PROGRESS NOTES
[facility]  Breast Navigator Encounter    Name:  Erica Holly      Age:  73 y.o.  Diagnosis:    LEFT breast cancer      Interdisciplinary Team:  Med-Onc:         Dr. Woods                  Surg-Onc:      Dr. Hazel           Rad-Onc:         Plastics:           :     Nurse Navigator:  Mone Garsia RN, BSN, [facility]    Encounter type:  []Patient Initiated  []Navigator Follow-up []Pre-op  []Post-op  []Check-in Prior to First Treatment []Treatment Modality Change  [x]Initial Navigator Encounter []Other:       Narrative:    Met patient,  and daughter at the time of her initial visit with Dr. Hazel.  They felt like they had all of their questions answered and received a lot of information today.      Discussed plan which is an appointment in the next week or so with Dr. Woods, medical oncology.  I will make this appointment tomorrow and call the patient with the date/time.      She also needs a few more biopsies (one on RIGHT and another on the LEFT breast as well as the LEFT axilla)  I told her that she would hear from a  regarding getting these set up.      She also needs a port placement, and I told her she would hear from the surgery scheduler for Dr. Hazel with the date/time of this appointment.      Provided the patient with my contact information and discussed my role in her care.       I will continue to follow the patient.      Referrals/Handouts:   Patient tracker, 31 bag, [employer], business card, breast cancer pin.            Mone Garsia RN, BSN, [facility]  Oncology Breast Navigator     [address]  [address]  W: [phone]  F: [phone]  [email or web address]  Good Help to Those in Need®

## 2024-05-01 NOTE — PROGRESS NOTES
NCCN Distress Thermometer    Date Screening Completed: 5/1/24    Screening Declined:  [] Yes    Number that best describes how much distress you've experienced in the past week, including today?  0 [] - No distress 1 []      2 []      3 []      4 []       5 []       6 []      7 []      8 []      9 []       10 [] - Extreme distress    PROBLEM LIST  Have you had concerns about any of the items below in the past week, including today?      Physical Concerns Practical Concerns   [] Pain [] Taking care of myself    [] Sleep [] Taking care of others    [] Fatigue [] Work   [] Tobacco use  [] School   [] Substance use  [] Housing   [] Memory or concentration [x] Finances   [] Sexual health [] Insurance   [] Changes in eating  [] Transportation   [] Loss or change of physical abilities  []     [] Having enough food   Emotional Concerns [] Access to medicine   [] Worry or anxiety [x] Treatment decisions   [] Sadness or depression    [] Loss of interest or enjoyment  Spiritual or Pentecostalism Concerns   [] Grief or loss  [] Sense of meaning or purpose   [] Fear [] Changes in roger or beliefs   [] Loneliness  [] Death, dying, or afterlife   [] Anger [] Conflict between beliefs and cancer treatments    [] Changes in appearance [] Relationship with the sacred   [] Feelings of worthlessness or being a burden [] Ritual or dietary needs        Social Concerns     [] Relationship with spouse or partner     [] Relationship with children    [] Relationship with family members     [] Relationship with friends or coworkers     [] Communication with health care team     [] Ability to have children     [] Prejudice or discrimination        Other Concerns:     Patient received resource information and education:  [x] Yes  [] No

## 2024-05-02 ENCOUNTER — PREP FOR PROCEDURE (OUTPATIENT)
Age: 74
End: 2024-05-02

## 2024-05-02 ENCOUNTER — TELEPHONE (OUTPATIENT)
Facility: HOSPITAL | Age: 74
End: 2024-05-02

## 2024-05-02 DIAGNOSIS — C50.912 MALIGNANT NEOPLASM OF LEFT BREAST IN FEMALE, ESTROGEN RECEPTOR NEGATIVE, UNSPECIFIED SITE OF BREAST (HCC): Primary | ICD-10-CM

## 2024-05-02 DIAGNOSIS — Z17.1 MALIGNANT NEOPLASM OF LEFT BREAST IN FEMALE, ESTROGEN RECEPTOR NEGATIVE, UNSPECIFIED SITE OF BREAST (HCC): Primary | ICD-10-CM

## 2024-05-02 NOTE — TELEPHONE ENCOUNTER
[facility]  Breast Navigator Encounter    Name:    Erica Holly  Age:    73 y.o.  Diagnosis:    LEFT breast cancer    LM for patient that her appt with Dr. Woods is 5/8/2024 at 1:00, [address], [facility], [address] [place] [address].  I asked her to call me back to confirm that she got this message.                              Mone Garsia RN, BSN, [facility]  Oncology Breast Navigator     [address]  [address]  W: [phone]  F: [phone]  [email or web address]  Good Help to Those in Need®

## 2024-05-03 ENCOUNTER — TELEPHONE (OUTPATIENT)
Facility: HOSPITAL | Age: 74
End: 2024-05-03

## 2024-05-03 NOTE — TELEPHONE ENCOUNTER
[facility]  Breast Navigator Encounter    Name:    Erica Holly  Age:    73 y.o.  Diagnosis:   LEFT breast cancer    Called patient to make sure that she had received the information about her appt with Dr. Woods next week on 5/8. She has this information and will take her  to the appt.      Asked about a party that she is helping her daughter with at the end of May, I think 5/31.  Asked whether or not she would be able to help with this.  I told her to discuss this with Dr. Woods because I told her that chemotherapy would probably come before surgery, which she did not seem aware of but is okay with.         Asked about the port, and I told her that this is scheduled for 5/23.  She should get a call from the surgery scheduler with the time.  I told her this was at [facility] in ambulatory surgery on the 7th floor.      I told her that the infusions would be at the infusion center on the ground floor near where she saw Dr. Hazel.    She has her dental appt on Tuesday to take care of some teeth that need work.   Has developed a head cold since she saw Dr. Hazel on 5/1, but feels okay.     She was appreciative of the phone call.                                 Mone Garsia RN, BSN, [facility]N  Oncology Breast Navigator     [address]  [address]  W: [phone]  F: [phone]  [email or web address]  Good Help to Those in Need®

## 2024-05-08 ENCOUNTER — CLINICAL DOCUMENTATION (OUTPATIENT)
Age: 74
End: 2024-05-08

## 2024-05-08 ENCOUNTER — INITIAL CONSULT (OUTPATIENT)
Age: 74
End: 2024-05-08
Payer: MEDICARE

## 2024-05-08 VITALS
SYSTOLIC BLOOD PRESSURE: 153 MMHG | HEART RATE: 103 BPM | BODY MASS INDEX: 33.47 KG/M2 | TEMPERATURE: 98.2 F | DIASTOLIC BLOOD PRESSURE: 84 MMHG | OXYGEN SATURATION: 94 % | RESPIRATION RATE: 20 BRPM | WEIGHT: 183 LBS

## 2024-05-08 DIAGNOSIS — C50.919 TRIPLE NEGATIVE BREAST CANCER (HCC): Primary | ICD-10-CM

## 2024-05-08 DIAGNOSIS — C50.112 MALIGNANT NEOPLASM OF CENTRAL PORTION OF LEFT BREAST IN FEMALE, ESTROGEN RECEPTOR NEGATIVE (HCC): ICD-10-CM

## 2024-05-08 DIAGNOSIS — I10 PRIMARY HYPERTENSION: ICD-10-CM

## 2024-05-08 DIAGNOSIS — Z17.1 MALIGNANT NEOPLASM OF CENTRAL PORTION OF LEFT BREAST IN FEMALE, ESTROGEN RECEPTOR NEGATIVE (HCC): ICD-10-CM

## 2024-05-08 PROBLEM — Z17.421 TRIPLE NEGATIVE BREAST CANCER (HCC): Status: ACTIVE | Noted: 2024-05-08

## 2024-05-08 PROCEDURE — 3077F SYST BP >= 140 MM HG: CPT | Performed by: INTERNAL MEDICINE

## 2024-05-08 PROCEDURE — 3079F DIAST BP 80-89 MM HG: CPT | Performed by: INTERNAL MEDICINE

## 2024-05-08 PROCEDURE — 99215 OFFICE O/P EST HI 40 MIN: CPT | Performed by: INTERNAL MEDICINE

## 2024-05-08 PROCEDURE — 99205 OFFICE O/P NEW HI 60 MIN: CPT | Performed by: INTERNAL MEDICINE

## 2024-05-08 NOTE — PROGRESS NOTES
Erica Holly is a 73 y.o. female  Chief Complaint   Patient presents with    Consultation     Invasive ductal carcinoma of breast, left     1. Have you been to the ER, urgent care clinic since your last visit?  Hospitalized since your last visit?No    2. Have you seen or consulted any other health care providers outside of the [facility] since your last visit?  Include any pap smears or colon screening. No.    
of left hemidiaphragm  eventration.    A summary portfolio with key images has been sent from kinetic analysis software  to PACS.    Impression  1. Left breast carcinoma at 3:00 (28 x 30 x 27 mm). BI-RADS 6.  2. Possible left axillary emma metastasis. BI-RADS 4.  3. Left breast mass at 6:00 (18 x 9 x 17 mm). BI-RADS 4.  4. Right upper outer quadrant non-masslike enhancement (11 x 17 x 9 mm). BI-RADS  4A.  5. Overall assessment: BI-RADS Assessment Category 4: Suspicious abnormality.  6. Recommendation: Bilateral MRI guided breast biopsies. Consider  ultrasound-guided left axillary lymph node biopsy.    Records reviewed and summarized above.  Pathology report(s) reviewed above.  Radiology report(s) reviewed above.      Assessment:/PLAN     1)  clinical T2N1 LEFT breast cancer post breast biopsy only 4/8/24  ER          [place]   Interpretation:     Negative     Interpretation:     Negative   Nuclear Staining %:     0%     Nuclear Staining %:     0%   Intensity:     NA     Intensity     NA   HER-2/abilio  Immunohistochemistry for Breast tumors   Results:     Negative, Score = 0   Ki-67 Immunohistochemical Assay   Result:          80% nuclear staining in invasive carcinoma     Records and history reviewed with pt today.  Reviewed path and data specifically with pt.   Discussed dx, stage and treatment of breast cancer.   Discussed triple negative path and no hormonal therapy options.   Discussed neoadjuvant and adjuvant chemo.   Discussed clinical trials today specifically / d/w trial nurse today.   Sent here for neoadjuvant chemo.   Pt is open to chemo.   Needs more MRI biopsies LEFT and RIGHT and LEFT LN biopsy.   D/w breast surgery.   Will need a port.   Pending biopsy results can screen for trial  trino chemo.   Chemo info given today. Rx for wig given as pt wanted this.   Pt has good family support and will discuss chemo with them.   Labs reviewed.   Fu in 2 weeks or post biopsies for chemo set up.   Pt feels well

## 2024-05-08 NOTE — PROGRESS NOTES
NCCN Distress Thermometer    Date Screening Completed: 5/8/2024    Screening Declined:  [] Yes    Number that best describes how much distress you've experienced in the past week, including today?  0 [x] - No distress 1 []      2 []      3 []      4 []       5 []       6 []      7 []      8 []      9 []       10 [] - Extreme distress    PROBLEM LIST  Have you had concerns about any of the items below in the past week, including today?      Physical Concerns Practical Concerns   [] Pain [] Taking care of myself    [] Sleep [] Taking care of others    [] Fatigue [] Work   [] Tobacco use  [] School   [] Substance use  [] Housing   [] Memory or concentration [x] Finances   [] Sexual health [] Insurance   [] Changes in eating  [] Transportation   [] Loss or change of physical abilities  []     [] Having enough food   Emotional Concerns [] Access to medicine   [] Worry or anxiety [] Treatment decisions   [] Sadness or depression    [] Loss of interest or enjoyment  Spiritual or Confucianist Concerns   [] Grief or loss  [] Sense of meaning or purpose   [] Fear [] Changes in roger or beliefs   [] Loneliness  [] Death, dying, or afterlife   [] Anger [] Conflict between beliefs and cancer treatments    [] Changes in appearance [] Relationship with the sacred   [] Feelings of worthlessness or being a burden [] Ritual or dietary needs        Social Concerns     [] Relationship with spouse or partner     [] Relationship with children    [] Relationship with family members     [] Relationship with friends or coworkers     [] Communication with health care team     [] Ability to have children     [] Prejudice or discrimination        Other Concerns: n/a    Patient received resource information and education:  [] Yes  [x] No - None discussed. SW did not meet with pt at this visit. Score <5 does not indicate Distress Screening follow-up.

## 2024-05-14 ENCOUNTER — HOSPITAL ENCOUNTER (OUTPATIENT)
Facility: HOSPITAL | Age: 74
Discharge: HOME | End: 2024-05-17
Attending: SURGERY
Payer: MEDICARE

## 2024-05-14 DIAGNOSIS — R92.8 ABNORMAL MRI, BREAST: ICD-10-CM

## 2024-05-14 PROCEDURE — 88305 TISSUE EXAM BY PATHOLOGIST: CPT

## 2024-05-14 PROCEDURE — 2500000003 HC RX 250 WO HCPCS: Performed by: RADIOLOGY

## 2024-05-14 PROCEDURE — 2709999900 US BREAST BIOPSY AXILLA LEFT

## 2024-05-14 PROCEDURE — 76642 ULTRASOUND BREAST LIMITED: CPT

## 2024-05-14 RX ORDER — LIDOCAINE HYDROCHLORIDE AND EPINEPHRINE 10; 10 MG/ML; UG/ML
10 INJECTION, SOLUTION INFILTRATION; PERINEURAL ONCE
Status: COMPLETED | OUTPATIENT
Start: 2024-05-14 | End: 2024-05-14

## 2024-05-14 RX ORDER — LIDOCAINE HYDROCHLORIDE 10 MG/ML
5 INJECTION, SOLUTION INFILTRATION; PERINEURAL ONCE
Status: COMPLETED | OUTPATIENT
Start: 2024-05-14 | End: 2024-05-14

## 2024-05-14 RX ORDER — LIDOCAINE HYDROCHLORIDE AND EPINEPHRINE 10; 10 MG/ML; UG/ML
40 INJECTION, SOLUTION INFILTRATION; PERINEURAL ONCE
Status: COMPLETED | OUTPATIENT
Start: 2024-05-15 | End: 2024-05-15

## 2024-05-14 RX ORDER — LIDOCAINE HYDROCHLORIDE 10 MG/ML
40 INJECTION, SOLUTION EPIDURAL; INFILTRATION; INTRACAUDAL; PERINEURAL ONCE
Status: COMPLETED | OUTPATIENT
Start: 2024-05-15 | End: 2024-05-15

## 2024-05-14 RX ADMIN — LIDOCAINE HYDROCHLORIDE 5 ML: 10 INJECTION, SOLUTION INFILTRATION; PERINEURAL at 13:55

## 2024-05-14 RX ADMIN — LIDOCAINE HYDROCHLORIDE,EPINEPHRINE BITARTRATE 10 ML: 10; .01 INJECTION, SOLUTION INFILTRATION; PERINEURAL at 13:55

## 2024-05-14 NOTE — PROGRESS NOTES
Patient tolerated left axilla biopsy well with scant bleeding. Biopsy site was bandaged in the usual fashion and discharge instructions were reviewed with the patient. She was provided with a written copy as well. Advised patient that results should be available in 2-3 business days and that she will receive a phone call. Encouraged her to call with any questions or concerns.

## 2024-05-15 ENCOUNTER — HOSPITAL ENCOUNTER (OUTPATIENT)
Age: 74
Discharge: HOME | End: 2024-05-18
Payer: MEDICARE

## 2024-05-15 DIAGNOSIS — R92.8 ABNORMAL MRI, BREAST: ICD-10-CM

## 2024-05-15 PROCEDURE — 19085 BX BREAST 1ST LESION MR IMAG: CPT

## 2024-05-15 PROCEDURE — 77066 DX MAMMO INCL CAD BI: CPT

## 2024-05-15 PROCEDURE — 2500000003 HC RX 250 WO HCPCS: Performed by: SURGERY

## 2024-05-15 PROCEDURE — 6360000004 HC RX CONTRAST MEDICATION: Performed by: RADIOLOGY

## 2024-05-15 PROCEDURE — A9585 GADOBUTROL INJECTION: HCPCS | Performed by: RADIOLOGY

## 2024-05-15 PROCEDURE — A4648 IMPLANTABLE TISSUE MARKER: HCPCS

## 2024-05-15 RX ORDER — GADOBUTROL 604.72 MG/ML
8 INJECTION INTRAVENOUS
Status: COMPLETED | OUTPATIENT
Start: 2024-05-15 | End: 2024-05-15

## 2024-05-15 RX ADMIN — LIDOCAINE HYDROCHLORIDE 40 ML: 10 INJECTION, SOLUTION EPIDURAL; INFILTRATION; INTRACAUDAL; PERINEURAL at 12:50

## 2024-05-15 RX ADMIN — LIDOCAINE HYDROCHLORIDE,EPINEPHRINE BITARTRATE 40 ML: 10; .01 INJECTION, SOLUTION INFILTRATION; PERINEURAL at 13:50

## 2024-05-15 RX ADMIN — GADOBUTROL 8 ML: 604.72 INJECTION INTRAVENOUS at 13:47

## 2024-05-15 NOTE — PROGRESS NOTES
Patient tolerated bilateral breast biopsy well with small amount of bleeding. Biopsy site was bandaged using steri strips, gauze and tegaderm, ice pack placed on site. Discharge instructions were reviewed with the patient who expresses understanding. She was provided with a written copy as well. Advised patient that results should be available by Friday or Monday, and that she will receive a phone call with these results. Encouraged her to call with any questions or concerns.

## 2024-05-22 ENCOUNTER — OFFICE VISIT (OUTPATIENT)
Age: 74
End: 2024-05-22
Payer: MEDICARE

## 2024-05-22 VITALS
WEIGHT: 182 LBS | HEART RATE: 72 BPM | OXYGEN SATURATION: 96 % | TEMPERATURE: 98.2 F | RESPIRATION RATE: 20 BRPM | DIASTOLIC BLOOD PRESSURE: 77 MMHG | BODY MASS INDEX: 33.29 KG/M2 | SYSTOLIC BLOOD PRESSURE: 139 MMHG

## 2024-05-22 DIAGNOSIS — I10 PRIMARY HYPERTENSION: ICD-10-CM

## 2024-05-22 DIAGNOSIS — C50.919 TRIPLE NEGATIVE BREAST CANCER (HCC): Primary | ICD-10-CM

## 2024-05-22 PROCEDURE — 99214 OFFICE O/P EST MOD 30 MIN: CPT | Performed by: INTERNAL MEDICINE

## 2024-05-22 PROCEDURE — 3075F SYST BP GE 130 - 139MM HG: CPT | Performed by: INTERNAL MEDICINE

## 2024-05-22 PROCEDURE — 3078F DIAST BP <80 MM HG: CPT | Performed by: INTERNAL MEDICINE

## 2024-05-22 PROCEDURE — 1123F ACP DISCUSS/DSCN MKR DOCD: CPT | Performed by: INTERNAL MEDICINE

## 2024-05-22 NOTE — PROGRESS NOTES
Erica Holly is a 73 y.o. female    Chief Complaint   Patient presents with    Follow-up     breast cancer       1. Have you been to the ER, urgent care clinic since your last visit?  Hospitalized since your last visit?No    2. Have you seen or consulted any other health care providers outside of the [facility] since your last visit?  Include any pap smears or colon screening. No      
reviewed above.  Radiology report(s) reviewed above.      Assessment:/PLAN     1)  clinical T2N1 LEFT breast cancer post breast biopsy only 4/8/24  ER          [place]   Interpretation:     Negative     Interpretation:     Negative   Nuclear Staining %:     0%     Nuclear Staining %:     0%   Intensity:     NA     Intensity     NA   HER-2/abilio  Immunohistochemistry for Breast tumors   Results:     Negative, Score = 0   Ki-67 Immunohistochemical Assay   Result:          80% nuclear staining in invasive carcinoma        Seen today for fu to discuss systemic therapy options.   Feels fine.   had MRI biopsies LEFT and RIGHT negative.   LEFT axillary LN biopsy+  Reviewed path today with pt and .   Discussed triple neg path again today.   Discussed neoadjuvant and adjuvant chemo.   Pt is unsure about doing any chemo.   Pt now wants LEFT mastectomy  Pt does NOT want chemo prior to surgery.   Does not want trial  trino chemo.   Pt wants to go to mastectomy surgery.    d/w breast surgery.   Pt will fu with breast surgery and see us after surgery.   Pt agreeable to post surgery fu here.     2) HTN/arthritis.  per IM.     3) FamHX.  None.   Aunt cancer not breast.     4) Psychosocial. Mood good. Coping well.   Has children that are supportive.   Not working now. 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       Fu here in 6 weeks or post breast surgery  Call if questions    I appreciate the opportunity to participate in Ms. Erica Holly's care.    Signed By: Jessa Woods, DO

## 2024-05-31 ENCOUNTER — TELEPHONE (OUTPATIENT)
Facility: HOSPITAL | Age: 74
End: 2024-05-31

## 2024-05-31 ENCOUNTER — TELEPHONE (OUTPATIENT)
Age: 74
End: 2024-05-31

## 2024-05-31 NOTE — TELEPHONE ENCOUNTER
[facility]  Breast Navigator Encounter    Name:    Erica Holly  Age:    74 y.o.  Diagnosis:   LEFT breast cancer    Called patient today to make sure that she wanted to move forward with surgery prior to any treatment with Dr. Woods.  She does want to proceed with a mastectomy, but she is not interested in reconstruction.    She has an appt with Dr. Hazel next week on 6/5.  I encouraged her to keep this appointment so she could discuss surgery with him at that time.    I wished her a happy birthday.  She was appreciative of the call.                              Mone Garsia RN, BSN, [facility]  Oncology Breast Navigator     [address]  [address]  W: [phone]  F: [phone]  [email or web address]  Good Help to Those in Need®

## 2024-06-05 ENCOUNTER — OFFICE VISIT (OUTPATIENT)
Age: 74
End: 2024-06-05
Payer: MEDICARE

## 2024-06-05 VITALS — BODY MASS INDEX: 33.49 KG/M2 | WEIGHT: 182 LBS | HEIGHT: 62 IN

## 2024-06-05 DIAGNOSIS — Z17.1 MALIGNANT NEOPLASM OF CENTRAL PORTION OF LEFT BREAST IN FEMALE, ESTROGEN RECEPTOR NEGATIVE (HCC): Primary | ICD-10-CM

## 2024-06-05 DIAGNOSIS — C50.112 MALIGNANT NEOPLASM OF CENTRAL PORTION OF LEFT BREAST IN FEMALE, ESTROGEN RECEPTOR NEGATIVE (HCC): Primary | ICD-10-CM

## 2024-06-05 PROCEDURE — 99213 OFFICE O/P EST LOW 20 MIN: CPT | Performed by: SURGERY

## 2024-06-05 PROCEDURE — 1123F ACP DISCUSS/DSCN MKR DOCD: CPT | Performed by: SURGERY

## 2024-06-05 NOTE — PROGRESS NOTES
HISTORY OF PRESENT ILLNESS  Erica Holly is a 74 y.o. female     HPI ESTABLISHED patient here for pre op visit pertaining to LEFT breast cancer. Pt is doing well overall. Mentioned a bump on left breast that has developed. Has some concern about it.         4/8/24: LEFT breast, 1 o'clock position; needle bx: IDC with squamous/ metaplastic features and necrosis, histologic grade 3. Size of largest viable invasive tumor focus in one tissue core: 3.5 mm. ER-/AR-/HER2-, Ki-67 80%     5/14/24: LEFT axilla, biopsy: Metastatic carcinoma. Metastatic deposit measures 3 mm in greatest linear extent. No extranodal extension identified.         Review of Systems      Physical Exam       ASSESSMENT and PLAN  {Assessment and Plan Chronic Disease:[id number]}    
   Overall Financial Resource Strain (CARDIA)     Difficulty of Paying Living Expenses: Not very hard   Food Insecurity: Not on file (2023)   Transportation Needs: Unknown (2023)    PRAPARE - Transportation     Lack of Transportation (Medical): Not on file     Lack of Transportation (Non-Medical): No   Physical Activity: Not on file   Stress: Not on file   Social Connections: Not on file   Intimate Partner Violence: Not on file   Housing Stability: Unknown (2023)    Housing Stability Vital Sign     Unable to Pay for Housing in the Last Year: Not on file     Number of Places Lived in the Last Year: Not on file     Unstable Housing in the Last Year: No       Current Outpatient Medications on File Prior to Visit   Medication Sig Dispense Refill    Cranial Prosthesis MISC by Does not apply route Wig for breast cancer chemo 1 each 0    TRELEGY ELLIPTA 100-62.5-25 MCG/ACT AEPB inhaler       spironolactone (ALDACTONE) 25 MG tablet Take 1 tablet by mouth 2 times daily 180 tablet 1    atorvastatin (LIPITOR) 20 MG tablet Take 1 tablet by mouth daily 90 tablet 1    amLODIPine (NORVASC) 10 MG tablet Take 1 tablet by mouth daily 90 tablet 1    aspirin 81 MG chewable tablet Take 1 tablet by mouth daily       No current facility-administered medications on file prior to visit.       Allergies   Allergen Reactions    Ace Inhibitors Cough    Asa-Apap-Salicyl-Caff      Other reaction(s): Not Reported This Time    Ibuprofen Hives and Swelling       OB History          2    Para   2    Term   2            AB        Living             SAB        IAB        Ectopic        Molar        Multiple        Live Births   2                Review of Systems    Physical Exam  Exam conducted with a chaperone present.   Constitutional:       Appearance: She is not diaphoretic.   HENT:      Head: Normocephalic and atraumatic.      Right Ear: External ear normal.      Left Ear: External ear normal.   Eyes:      General: No

## 2024-06-07 ENCOUNTER — HOSPITAL ENCOUNTER (OUTPATIENT)
Facility: HOSPITAL | Age: 74
End: 2024-06-07
Payer: MEDICARE

## 2024-06-07 VITALS
TEMPERATURE: 98.2 F | SYSTOLIC BLOOD PRESSURE: 138 MMHG | HEART RATE: 64 BPM | WEIGHT: 179 LBS | HEIGHT: 62 IN | BODY MASS INDEX: 32.94 KG/M2 | DIASTOLIC BLOOD PRESSURE: 78 MMHG

## 2024-06-07 LAB
ANION GAP SERPL CALC-SCNC: 5 MMOL/L (ref 5–15)
BASOPHILS # BLD: 0 K/UL (ref 0–0.1)
BASOPHILS NFR BLD: 1 % (ref 0–1)
BUN SERPL-MCNC: 13 MG/DL (ref 6–20)
BUN/CREAT SERPL: 12 (ref 12–20)
CALCIUM SERPL-MCNC: 9.4 MG/DL (ref 8.5–10.1)
CHLORIDE SERPL-SCNC: 105 MMOL/L (ref 97–108)
CO2 SERPL-SCNC: 27 MMOL/L (ref 21–32)
CREAT SERPL-MCNC: 1.13 MG/DL (ref 0.55–1.02)
DIFFERENTIAL METHOD BLD: NORMAL
EKG ATRIAL RATE: 73 BPM
EKG DIAGNOSIS: NORMAL
EKG P AXIS: 50 DEGREES
EKG P-R INTERVAL: 174 MS
EKG Q-T INTERVAL: 408 MS
EKG QRS DURATION: 98 MS
EKG QTC CALCULATION (BAZETT): 449 MS
EKG R AXIS: -33 DEGREES
EKG T AXIS: 46 DEGREES
EKG VENTRICULAR RATE: 73 BPM
EOSINOPHIL # BLD: 0.2 K/UL (ref 0–0.4)
EOSINOPHIL NFR BLD: 2 % (ref 0–7)
ERYTHROCYTE [DISTWIDTH] IN BLOOD BY AUTOMATED COUNT: 14.1 % (ref 11.5–14.5)
GLUCOSE SERPL-MCNC: 90 MG/DL (ref 65–100)
HCT VFR BLD AUTO: 38.9 % (ref 35–47)
HGB BLD-MCNC: 13 G/DL (ref 11.5–16)
IMM GRANULOCYTES # BLD AUTO: 0 K/UL (ref 0–0.04)
IMM GRANULOCYTES NFR BLD AUTO: 0 % (ref 0–0.5)
LYMPHOCYTES # BLD: 2 K/UL (ref 0.8–3.5)
LYMPHOCYTES NFR BLD: 26 % (ref 12–49)
MCH RBC QN AUTO: 29.3 PG (ref 26–34)
MCHC RBC AUTO-ENTMCNC: 33.4 G/DL (ref 30–36.5)
MCV RBC AUTO: 87.6 FL (ref 80–99)
MONOCYTES # BLD: 0.8 K/UL (ref 0–1)
MONOCYTES NFR BLD: 10 % (ref 5–13)
NEUTS SEG # BLD: 4.6 K/UL (ref 1.8–8)
NEUTS SEG NFR BLD: 61 % (ref 32–75)
NRBC # BLD: 0 K/UL (ref 0–0.01)
NRBC BLD-RTO: 0 PER 100 WBC
PLATELET # BLD AUTO: 279 K/UL (ref 150–400)
PMV BLD AUTO: 9 FL (ref 8.9–12.9)
POTASSIUM SERPL-SCNC: 4 MMOL/L (ref 3.5–5.1)
RBC # BLD AUTO: 4.44 M/UL (ref 3.8–5.2)
SODIUM SERPL-SCNC: 137 MMOL/L (ref 136–145)
WBC # BLD AUTO: 7.6 K/UL (ref 3.6–11)

## 2024-06-07 PROCEDURE — 85025 COMPLETE CBC W/AUTO DIFF WBC: CPT

## 2024-06-07 PROCEDURE — 93005 ELECTROCARDIOGRAM TRACING: CPT | Performed by: SURGERY

## 2024-06-07 PROCEDURE — 80048 BASIC METABOLIC PNL TOTAL CA: CPT

## 2024-06-07 PROCEDURE — 36415 COLL VENOUS BLD VENIPUNCTURE: CPT

## 2024-06-07 NOTE — PERIOP NOTE
[address] [address]   MAIN OR                                  [phone]    MAIN PRE OP             [phone]                                                                                AMBULATORY PRE OP          [phone]  PRE-ADMISSION TESTING    [phone]     Surgery Date:  6/13/24       Is surgery arrival time given by surgeon?  YES  NO    If “NO”, [facility] staff will call you between 4 and 7pm the day before your surgery with your arrival time. (If your surgery is on a Monday, we will call you the Friday before.)    Call [phone] after 7pm Monday-Friday if you did not receive this call.    INSTRUCTIONS BEFORE YOUR SURGERY   When You  Arrive Arrive at [facility] Patient Access on 1st floor the day of your surgery.  Have your insurance card, photo ID,living will/advanced directive/POA (if applicable),  and any copayment (if needed)   Food   and   Drink NO solid food after midnight the night before surgery. You can drink clear liquids from midnight until ONE hour prior to your arrival at the hospital on the day of your surgery. Clear liquids include:  Water  Fruit juices without pulp  Carbonated beverages  Black coffee(no cream/milk)  Tea(no cream/milk)  Gatorade    No alcohol (beer, wine, liquor) or marijuana (smoking) 24 hours, edibles (3 days). Stop smoking cigarettes 14 days before surgery (helps w/healing and breathing).   Medications to   TAKE   Morning of Surgery MEDICATIONS TO TAKE THE MORNING OF SURGERY WITH A SIP OF WATER:   NONE  You may take these medications, IF NEEDED, the morning of surgery: TRELEGY INHALER     Medications to STOP  before surgery Non-Steroidal anti-inflammatory Drugs (NSAID's): for example, Ibuprofen (Advil, Motrin), Naproxen (Aleve) 3 days  STOP all herbal supplements and vitamins(unless prescribed by your doctor), and fish oil for 7 days  Other:  (Pain medications not listed above,

## 2024-06-13 ENCOUNTER — ANESTHESIA (OUTPATIENT)
Facility: HOSPITAL | Age: 74
End: 2024-06-13
Payer: MEDICARE

## 2024-06-13 ENCOUNTER — HOSPITAL ENCOUNTER (OUTPATIENT)
Facility: HOSPITAL | Age: 74
Discharge: HOME | End: 2024-06-14
Attending: SURGERY | Admitting: SURGERY
Payer: MEDICARE

## 2024-06-13 ENCOUNTER — ANESTHESIA EVENT (OUTPATIENT)
Facility: HOSPITAL | Age: 74
End: 2024-06-13
Payer: MEDICARE

## 2024-06-13 DIAGNOSIS — Z17.1 MALIGNANT NEOPLASM OF LEFT BREAST IN FEMALE, ESTROGEN RECEPTOR NEGATIVE, UNSPECIFIED SITE OF BREAST (HCC): ICD-10-CM

## 2024-06-13 DIAGNOSIS — Z90.12 S/P MASTECTOMY, LEFT: Primary | ICD-10-CM

## 2024-06-13 DIAGNOSIS — C50.912 MALIGNANT NEOPLASM OF LEFT BREAST IN FEMALE, ESTROGEN RECEPTOR NEGATIVE, UNSPECIFIED SITE OF BREAST (HCC): ICD-10-CM

## 2024-06-13 PROBLEM — C50.919 BREAST CANCER IN FEMALE (HCC): Status: ACTIVE | Noted: 2024-06-13

## 2024-06-13 PROCEDURE — 7100000000 HC PACU RECOVERY - FIRST 15 MIN: Performed by: SURGERY

## 2024-06-13 PROCEDURE — 2709999900 HC NON-CHARGEABLE SUPPLY: Performed by: SURGERY

## 2024-06-13 PROCEDURE — 88309 TISSUE EXAM BY PATHOLOGIST: CPT

## 2024-06-13 PROCEDURE — 2580000003 HC RX 258: Performed by: SURGERY

## 2024-06-13 PROCEDURE — 2500000003 HC RX 250 WO HCPCS: Performed by: NURSE ANESTHETIST, CERTIFIED REGISTERED

## 2024-06-13 PROCEDURE — C9290 INJ, BUPIVACAINE LIPOSOME: HCPCS | Performed by: SURGERY

## 2024-06-13 PROCEDURE — 6370000000 HC RX 637 (ALT 250 FOR IP): Performed by: SURGERY

## 2024-06-13 PROCEDURE — 2720000010 HC SURG SUPPLY STERILE: Performed by: SURGERY

## 2024-06-13 PROCEDURE — 3700000000 HC ANESTHESIA ATTENDED CARE: Performed by: SURGERY

## 2024-06-13 PROCEDURE — 3600000013 HC SURGERY LEVEL 3 ADDTL 15MIN: Performed by: SURGERY

## 2024-06-13 PROCEDURE — 6360000002 HC RX W HCPCS: Performed by: NURSE ANESTHETIST, CERTIFIED REGISTERED

## 2024-06-13 PROCEDURE — 2580000003 HC RX 258: Performed by: NURSE ANESTHETIST, CERTIFIED REGISTERED

## 2024-06-13 PROCEDURE — 3700000001 HC ADD 15 MINUTES (ANESTHESIA): Performed by: SURGERY

## 2024-06-13 PROCEDURE — 6370000000 HC RX 637 (ALT 250 FOR IP)

## 2024-06-13 PROCEDURE — 6360000002 HC RX W HCPCS: Performed by: SURGERY

## 2024-06-13 PROCEDURE — 3600000003 HC SURGERY LEVEL 3 BASE: Performed by: SURGERY

## 2024-06-13 PROCEDURE — 7100000001 HC PACU RECOVERY - ADDTL 15 MIN: Performed by: SURGERY

## 2024-06-13 PROCEDURE — 88360 TUMOR IMMUNOHISTOCHEM/MANUAL: CPT

## 2024-06-13 RX ORDER — OXYCODONE HYDROCHLORIDE AND ACETAMINOPHEN 5; 325 MG/1; MG/1
1 TABLET ORAL EVERY 4 HOURS PRN
Qty: 15 TABLET | Refills: 0 | Status: SHIPPED | OUTPATIENT
Start: 2024-06-13 | End: 2024-06-16

## 2024-06-13 RX ORDER — HYDROMORPHONE HYDROCHLORIDE 1 MG/ML
0.5 INJECTION, SOLUTION INTRAMUSCULAR; INTRAVENOUS; SUBCUTANEOUS
Status: DISCONTINUED | OUTPATIENT
Start: 2024-06-13 | End: 2024-06-14 | Stop reason: HOSPADM

## 2024-06-13 RX ORDER — ONDANSETRON 2 MG/ML
4 INJECTION INTRAMUSCULAR; INTRAVENOUS EVERY 6 HOURS PRN
Status: CANCELLED | OUTPATIENT
Start: 2024-06-13

## 2024-06-13 RX ORDER — BUDESONIDE 0.5 MG/2ML
0.5 INHALANT ORAL
Status: DISCONTINUED | OUTPATIENT
Start: 2024-06-14 | End: 2024-06-14

## 2024-06-13 RX ORDER — OXYCODONE HYDROCHLORIDE 5 MG/1
5 TABLET ORAL EVERY 4 HOURS PRN
Status: DISCONTINUED | OUTPATIENT
Start: 2024-06-13 | End: 2024-06-14 | Stop reason: HOSPADM

## 2024-06-13 RX ORDER — ONDANSETRON 2 MG/ML
INJECTION INTRAMUSCULAR; INTRAVENOUS PRN
Status: DISCONTINUED | OUTPATIENT
Start: 2024-06-13 | End: 2024-06-13 | Stop reason: SDUPTHER

## 2024-06-13 RX ORDER — FENTANYL CITRATE 50 UG/ML
INJECTION, SOLUTION INTRAMUSCULAR; INTRAVENOUS PRN
Status: DISCONTINUED | OUTPATIENT
Start: 2024-06-13 | End: 2024-06-13 | Stop reason: SDUPTHER

## 2024-06-13 RX ORDER — FENTANYL/BUPIVACAINE/NS/PF 2-1250MCG
1-15 PLASTIC BAG, INJECTION (ML) INJECTION CONTINUOUS
Status: CANCELLED | OUTPATIENT
Start: 2024-06-13

## 2024-06-13 RX ORDER — SODIUM CHLORIDE 0.9 % (FLUSH) 0.9 %
5-40 SYRINGE (ML) INJECTION PRN
Status: DISCONTINUED | OUTPATIENT
Start: 2024-06-13 | End: 2024-06-14 | Stop reason: HOSPADM

## 2024-06-13 RX ORDER — DEXTROSE MONOHYDRATE, SODIUM CHLORIDE, AND POTASSIUM CHLORIDE 50; 1.49; 4.5 G/1000ML; G/1000ML; G/1000ML
INJECTION, SOLUTION INTRAVENOUS CONTINUOUS
Status: DISCONTINUED | OUTPATIENT
Start: 2024-06-13 | End: 2024-06-14 | Stop reason: HOSPADM

## 2024-06-13 RX ORDER — SUCCINYLCHOLINE/SOD CL,ISO/PF 200MG/10ML
SYRINGE (ML) INTRAVENOUS PRN
Status: DISCONTINUED | OUTPATIENT
Start: 2024-06-13 | End: 2024-06-13 | Stop reason: SDUPTHER

## 2024-06-13 RX ORDER — SODIUM CHLORIDE 9 MG/ML
INJECTION, SOLUTION INTRAVENOUS PRN
Status: DISCONTINUED | OUTPATIENT
Start: 2024-06-13 | End: 2024-06-14 | Stop reason: HOSPADM

## 2024-06-13 RX ORDER — MIDAZOLAM HYDROCHLORIDE 1 MG/ML
INJECTION INTRAMUSCULAR; INTRAVENOUS PRN
Status: DISCONTINUED | OUTPATIENT
Start: 2024-06-13 | End: 2024-06-13 | Stop reason: SDUPTHER

## 2024-06-13 RX ORDER — ONDANSETRON 4 MG/1
4 TABLET, ORALLY DISINTEGRATING ORAL EVERY 8 HOURS PRN
Status: DISCONTINUED | OUTPATIENT
Start: 2024-06-13 | End: 2024-06-14 | Stop reason: HOSPADM

## 2024-06-13 RX ORDER — ONDANSETRON 2 MG/ML
4 INJECTION INTRAMUSCULAR; INTRAVENOUS EVERY 6 HOURS PRN
Status: DISCONTINUED | OUTPATIENT
Start: 2024-06-13 | End: 2024-06-14 | Stop reason: HOSPADM

## 2024-06-13 RX ORDER — BUPIVACAINE HYDROCHLORIDE AND EPINEPHRINE 5; 5 MG/ML; UG/ML
30 INJECTION, SOLUTION EPIDURAL; INTRACAUDAL; PERINEURAL ONCE
Status: DISCONTINUED | OUTPATIENT
Start: 2024-06-13 | End: 2024-06-13

## 2024-06-13 RX ORDER — NALOXONE HYDROCHLORIDE 0.4 MG/ML
INJECTION, SOLUTION INTRAMUSCULAR; INTRAVENOUS; SUBCUTANEOUS PRN
Status: CANCELLED | OUTPATIENT
Start: 2024-06-13

## 2024-06-13 RX ORDER — SODIUM CHLORIDE 0.9 % (FLUSH) 0.9 %
5-40 SYRINGE (ML) INJECTION EVERY 12 HOURS SCHEDULED
Status: DISCONTINUED | OUTPATIENT
Start: 2024-06-13 | End: 2024-06-14 | Stop reason: HOSPADM

## 2024-06-13 RX ORDER — SPIRONOLACTONE 25 MG/1
25 TABLET ORAL 2 TIMES DAILY
Status: DISCONTINUED | OUTPATIENT
Start: 2024-06-13 | End: 2024-06-14 | Stop reason: HOSPADM

## 2024-06-13 RX ORDER — LIDOCAINE HYDROCHLORIDE 20 MG/ML
INJECTION, SOLUTION EPIDURAL; INFILTRATION; INTRACAUDAL; PERINEURAL PRN
Status: DISCONTINUED | OUTPATIENT
Start: 2024-06-13 | End: 2024-06-13 | Stop reason: SDUPTHER

## 2024-06-13 RX ORDER — ACETAMINOPHEN 325 MG/1
650 TABLET ORAL EVERY 4 HOURS PRN
Status: DISCONTINUED | OUTPATIENT
Start: 2024-06-13 | End: 2024-06-14 | Stop reason: HOSPADM

## 2024-06-13 RX ORDER — ARFORMOTEROL TARTRATE 15 UG/2ML
15 SOLUTION RESPIRATORY (INHALATION)
Status: DISCONTINUED | OUTPATIENT
Start: 2024-06-14 | End: 2024-06-14

## 2024-06-13 RX ORDER — AMLODIPINE BESYLATE 5 MG/1
10 TABLET ORAL DAILY
Status: DISCONTINUED | OUTPATIENT
Start: 2024-06-14 | End: 2024-06-14 | Stop reason: HOSPADM

## 2024-06-13 RX ORDER — DEXMEDETOMIDINE HYDROCHLORIDE 100 UG/ML
INJECTION, SOLUTION INTRAVENOUS PRN
Status: DISCONTINUED | OUTPATIENT
Start: 2024-06-13 | End: 2024-06-13 | Stop reason: SDUPTHER

## 2024-06-13 RX ORDER — OXYCODONE HYDROCHLORIDE 5 MG/1
10 TABLET ORAL EVERY 4 HOURS PRN
Status: DISCONTINUED | OUTPATIENT
Start: 2024-06-13 | End: 2024-06-14 | Stop reason: HOSPADM

## 2024-06-13 RX ORDER — ALBUTEROL SULFATE 90 UG/1
AEROSOL, METERED RESPIRATORY (INHALATION) PRN
Status: DISCONTINUED | OUTPATIENT
Start: 2024-06-13 | End: 2024-06-13 | Stop reason: SDUPTHER

## 2024-06-13 RX ORDER — ROCURONIUM BROMIDE 10 MG/ML
INJECTION, SOLUTION INTRAVENOUS PRN
Status: DISCONTINUED | OUTPATIENT
Start: 2024-06-13 | End: 2024-06-13 | Stop reason: SDUPTHER

## 2024-06-13 RX ORDER — HYDROMORPHONE HYDROCHLORIDE 1 MG/ML
0.25 INJECTION, SOLUTION INTRAMUSCULAR; INTRAVENOUS; SUBCUTANEOUS
Status: DISCONTINUED | OUTPATIENT
Start: 2024-06-13 | End: 2024-06-14 | Stop reason: HOSPADM

## 2024-06-13 RX ORDER — DIPHENHYDRAMINE HYDROCHLORIDE 50 MG/ML
12.5 INJECTION INTRAMUSCULAR; INTRAVENOUS EVERY 4 HOURS PRN
Status: CANCELLED | OUTPATIENT
Start: 2024-06-13

## 2024-06-13 RX ORDER — DEXAMETHASONE SODIUM PHOSPHATE 4 MG/ML
INJECTION, SOLUTION INTRA-ARTICULAR; INTRALESIONAL; INTRAMUSCULAR; INTRAVENOUS; SOFT TISSUE PRN
Status: DISCONTINUED | OUTPATIENT
Start: 2024-06-13 | End: 2024-06-13 | Stop reason: SDUPTHER

## 2024-06-13 RX ORDER — SODIUM CHLORIDE, SODIUM LACTATE, POTASSIUM CHLORIDE, CALCIUM CHLORIDE 600; 310; 30; 20 MG/100ML; MG/100ML; MG/100ML; MG/100ML
INJECTION, SOLUTION INTRAVENOUS CONTINUOUS PRN
Status: DISCONTINUED | OUTPATIENT
Start: 2024-06-13 | End: 2024-06-13 | Stop reason: SDUPTHER

## 2024-06-13 RX ADMIN — Medication 120 MG: at 12:52

## 2024-06-13 RX ADMIN — ALBUTEROL SULFATE 2 PUFF: 90 AEROSOL, METERED RESPIRATORY (INHALATION) at 14:10

## 2024-06-13 RX ADMIN — PROPOFOL 50 MG: 10 INJECTION, EMULSION INTRAVENOUS at 13:12

## 2024-06-13 RX ADMIN — ONDANSETRON HYDROCHLORIDE 4 MG: 2 INJECTION, SOLUTION INTRAMUSCULAR; INTRAVENOUS at 13:55

## 2024-06-13 RX ADMIN — ROCURONIUM BROMIDE 5 MG: 10 SOLUTION INTRAVENOUS at 12:52

## 2024-06-13 RX ADMIN — PROPOFOL 150 MG: 10 INJECTION, EMULSION INTRAVENOUS at 12:52

## 2024-06-13 RX ADMIN — SODIUM CHLORIDE, PRESERVATIVE FREE 10 ML: 5 INJECTION INTRAVENOUS at 21:07

## 2024-06-13 RX ADMIN — SODIUM CHLORIDE, POTASSIUM CHLORIDE, SODIUM LACTATE AND CALCIUM CHLORIDE: 600; 310; 30; 20 INJECTION, SOLUTION INTRAVENOUS at 12:45

## 2024-06-13 RX ADMIN — WATER 2000 MG: 1 INJECTION INTRAMUSCULAR; INTRAVENOUS; SUBCUTANEOUS at 13:08

## 2024-06-13 RX ADMIN — DEXMEDETOMIDINE HYDROCHLORIDE 10 MCG: 100 INJECTION, SOLUTION, CONCENTRATE INTRAVENOUS at 13:21

## 2024-06-13 RX ADMIN — MIDAZOLAM 2 MG: 1 INJECTION INTRAMUSCULAR; INTRAVENOUS at 12:45

## 2024-06-13 RX ADMIN — LIDOCAINE HYDROCHLORIDE 80 MG: 20 INJECTION, SOLUTION EPIDURAL; INFILTRATION; INTRACAUDAL; PERINEURAL at 12:52

## 2024-06-13 RX ADMIN — DEXAMETHASONE SODIUM PHOSPHATE 4 MG: 4 INJECTION, SOLUTION INTRAMUSCULAR; INTRAVENOUS at 12:52

## 2024-06-13 RX ADMIN — ACETAMINOPHEN 650 MG: 325 TABLET ORAL at 21:11

## 2024-06-13 RX ADMIN — FENTANYL CITRATE 50 MCG: 50 INJECTION INTRAMUSCULAR; INTRAVENOUS at 12:52

## 2024-06-13 RX ADMIN — FENTANYL CITRATE 50 MCG: 50 INJECTION INTRAMUSCULAR; INTRAVENOUS at 13:11

## 2024-06-13 ASSESSMENT — PAIN SCALES - GENERAL: PAINLEVEL_OUTOF10: 2

## 2024-06-13 ASSESSMENT — PAIN - FUNCTIONAL ASSESSMENT
PAIN_FUNCTIONAL_ASSESSMENT: NONE - DENIES PAIN

## 2024-06-13 NOTE — BRIEF OP NOTE
Brief Postoperative Note      Patient: Erica Holly  YOB: 1950  MRN: [id number]    Date of Procedure: 6/13/2024    Pre-Op Diagnosis Codes:     * Malignant neoplasm of left breast (HCC) [C50.912]    Post-Op Diagnosis: Same       Procedure(s):  LEFT BREAST MODIFIED RADICAL MASTECTOMY    Surgeon(s):  Antony Hazel Jr, MD    Assistant:  Surgical Assistant: Ward Moctezuma    Anesthesia: General    Estimated Blood Loss (mL): Minimal    Complications: None    Specimens:   ID Type Source Tests Collected by Time Destination   1 : LEFT BREAST MODIFIED RADICAL MASTECTOMY, STITCH-SUPERIOR Tissue Breast SURGICAL PATHOLOGY Antony Hazel Jr, MD 6/13/2024 1342        Implants:  * No implants in log *      Drains:   Closed/Suction Drain Left Breast Bulb (Active)   Site Description Clean, dry & intact 06/13/24 1353   Dressing Status Clean, dry & intact 06/13/24 1353   Drain Status To bulb suction 06/13/24 1353       Findings:  Infection Present At Time Of Surgery (PATOS) (choose all levels that have infection present):  No infection present  Other Findings: left breast and nodes         Electronically signed by Antony Hazel MD on 6/13/2024 at 2:03 PM

## 2024-06-13 NOTE — ANESTHESIA POSTPROCEDURE EVALUATION
Department of Anesthesiology  Postprocedure Note    Patient: Erica Holly  MRN: [id number]  YOB: 1950  Date of evaluation: 6/13/2024    Procedure Summary       Date: 06/13/24 Room / Location: [facility] ASU A1 / [facility] AMBULATORY OR    Anesthesia Start: 1245 Anesthesia Stop: 1430    Procedure: LEFT BREAST MODIFIED RADICAL MASTECTOMY (Left: Breast) Diagnosis:       Malignant neoplasm of left breast (HCC)      (Malignant neoplasm of left breast (HCC) [C50.912])    Surgeons: Antony Hazel Jr, MD Responsible Provider: Juancho Li MD    Anesthesia Type: General ASA Status: 2            Anesthesia Type: General    Naga Phase I: Naga Score: 10    Naga Phase II:      Anesthesia Post Evaluation  Post-Anesthesia Evaluation and Assessment    Patient: Erica Holly MRN: [id number]  SSN: [id number]    YOB: 1950  Age: 74 y.o.  Sex: female      I have evaluated the patient and they are stable and ready for discharge from the PACU.     Cardiovascular Function/Vital Signs  Visit Vitals  /85   Pulse 72   Temp 98.4 °F (36.9 °C) (Oral)   Resp 16   Ht 1.575 m (5' 2\")   Wt 82.1 kg (181 lb)   SpO2 97%   BMI 33.11 kg/m²       Patient is status post General anesthesia for Procedure(s):  LEFT BREAST MODIFIED RADICAL MASTECTOMY.    Nausea/Vomiting: None    Postoperative hydration reviewed and adequate.    Pain:      Managed    Neurological Status:       At baseline    Mental Status, Level of Consciousness: Alert and  oriented to person, place, and time    Pulmonary Status:       Adequate oxygenation and airway patent    Complications related to anesthesia: None    Post-anesthesia assessment completed. No concerns    Signed By: Juancho Li MD     June 13, 2024                No notable events documented.

## 2024-06-13 NOTE — H&P
NCCN Guidelines® for Survivorship recommend proactive screening for lymphedema using bioimpedance spectroscopy. Whenever possible, patients are tested for baseline L-Dex score before cancer treatment begins and then are reassessed during regular follow-up visits using the SOZO device. Otherwise, this can be started postoperatively and continued during regular follow-up visits. If the patient's L-Dex score increases above normal levels, that is a sign that lymphedema is developing and a referral is made to physical therapy for further evaluation and early compression treatment. Lymphedema assessment with the SOZO L-Dex score is recommended to be done every 3 months for the first 3 years and then every 6 months for years 4 and 5 followed by annually afterwards.      Review of Systems     Physical Exam  Exam conducted with a chaperone present.   Constitutional:       Appearance: She is not diaphoretic.   HENT:      Head: Normocephalic and atraumatic.      Right Ear: External ear normal.      Left Ear: External ear normal.   Eyes:      General: No scleral icterus.        Right eye: No discharge.         Left eye: No discharge.      Pupils: Pupils are equal, round, and reactive to light.   Cardiovascular:      Rate and Rhythm: Normal rate and regular rhythm.   Pulmonary:      Effort: Pulmonary effort is normal. No respiratory distress.      Breath sounds: Normal breath sounds. No stridor.   Chest:        Abdominal:      General: There is no distension.      Palpations: Abdomen is soft. There is no mass.      Tenderness: There is no abdominal tenderness.   Musculoskeletal:         General: Normal range of motion.      Cervical back: Normal range of motion and neck supple.   Lymphadenopathy:      Cervical: No cervical adenopathy.   Skin:     General: Skin is warm.   Neurological:      Mental Status: She is alert and oriented to person, place, and time.   Psychiatric:         Behavior: Behavior normal.         Thought  chemotherapy. Discussed possible chemotherapy regimes.      Patient has elected to proceed with biopsies. We will refer her to medical oncology and schedule her PAC insertion.

## 2024-06-13 NOTE — ANESTHESIA PRE PROCEDURE
Shilpi, Braxton, APRN - CRNA   50 mcg at 06/13/24 1252    midazolam (VERSED) injection   IntraVENous PRN Shilpi, Braxton, APRN - CRNA   2 mg at 06/13/24 1245    lidocaine PF 2 % injection   IntraVENous PRN Shilpi, Braxton, APRN - CRNA   80 mg at 06/13/24 1252    propofol infusion   IntraVENous PRN Shilpi, Braxton, APRN - CRNA   150 mg at 06/13/24 1252    rocuronium (ZEMURON) injection   IntraVENous PRN Shilpi, Braxton, APRN - CRNA   5 mg at 06/13/24 1252    succinylcholine (ANECTINE) injection   IntraVENous PRN Shilpi, Braxton, APRN - CRNA   120 mg at 06/13/24 1252    dexAMETHasone (DECADRON) injection   IntraVENous PRN Shilpi, Braxton, APRN - CRNA   4 mg at 06/13/24 1252    lactated ringers IV soln infusion   IntraVENous Continuous PRN Shilpi, Braxton, APRN - CRNA   New Bag at 06/13/24 1245       Allergies:    Allergies   Allergen Reactions    Ace Inhibitors Cough    Ibuprofen Hives and Swelling       Problem List:    Patient Active Problem List   Diagnosis Code    Primary osteoarthritis of knee M17.10    Primary osteoarthritis of both knees M17.0    Cerebral infarction due to thrombosis of precerebral artery ([facility]) I63.00    PAD (peripheral artery disease) ([facility]) I73.9    Dyslipidemia, goal LDL below 100 E78.5    Vein disorder I87.9    Hypertension I10    Benign essential hypertension I10    Chronic renal disease, stage III ([facility]) N18.30    Reflux laryngitis J04.0, K21.9    Mild intermittent asthma J45.20    Invasive ductal carcinoma of breast, left (HCC) C50.912    Malignant neoplasm of left breast (HCC) C50.912    Triple negative breast cancer (HCC) C50.919    S/P mastectomy, left Z90.12       Past Medical History:        Diagnosis Date    Arthritis     Breast cancer (HCC) 2024    LEFT BREAST    COVID 2020    NO ISSUES    DVT (deep venous thrombosis) ([facility])     Hyperlipidemia     Hypertension 6/15/2010    TIA (transient ischemic attack) 2010    NO RESIDUAL       Past Surgical History:

## 2024-06-13 NOTE — DISCHARGE INSTRUCTIONS
Discharge Instructions from Dr. Hazel    I will call you with the pathology results, typically within 1 week from today.  You may shower, but no hot tubs, swimming pools, or baths until your incision is healed.  No heavy lifting with the affected extremity (nothing greater than 5 pounds), and limit its use for the next 4-5 days.  You may use an ice pack for comfort for the next couple of days, but do not place ice directly on the skin.  Rather, use a towel or clothing to serve as a barrier between skin and ice to prevent injury.  If I placed a drain, follow the drain instructions provided, especially as you keep a record of the drain output.  Follow medication instructions carefully.  Watch for signs of infection as listed below.  Redness  Swelling  Drainage from the incision or from your nipple that appears infected  Fever over 101 degrees for consecutive readings, or over 99.5 if you are currently undergoing chemotherapy.  Call our office (number is below) for a follow-up appointment.  If you have any problems, our phone number is [phone].

## 2024-06-13 NOTE — PROGRESS NOTES
Addended by: LARISSA ALBERT on: 4/16/2019 07:32 AM     Modules accepted: Tadeo Mccall     TRANSFER - OUT REPORT:    Verbal report given to MARY Blunt on Erica Holly  being transferred to [facility],  #[phone] for routine post-op       Report consisted of patient's Situation, Background, Assessment and   Recommendations(SBAR).     Information from the following report(s) Nurse Handoff Report, Surgery Report, Intake/Output, and MAR was reviewed with the receiving nurse.           Lines:   Peripheral IV 06/13/24 Posterior;Right Hand (Active)   Site Assessment Clean, dry & intact 06/13/24 1545   Line Status Normal saline locked 06/13/24 1545   Line Care Connections checked and tightened 06/13/24 1430   Phlebitis Assessment No symptoms 06/13/24 1545   Infiltration Assessment 0 06/13/24 1545   Dressing Status Clean, dry & intact 06/13/24 1545   Dressing Type Transparent 06/13/24 1545        Opportunity for questions and clarification was provided.      Patient transported with:  Patient-specific medications from Pharmacy (albuterol) and belongings

## 2024-06-14 VITALS
HEIGHT: 62 IN | BODY MASS INDEX: 33.31 KG/M2 | RESPIRATION RATE: 18 BRPM | HEART RATE: 62 BPM | OXYGEN SATURATION: 99 % | TEMPERATURE: 97.5 F | DIASTOLIC BLOOD PRESSURE: 71 MMHG | WEIGHT: 181 LBS | SYSTOLIC BLOOD PRESSURE: 123 MMHG

## 2024-06-14 PROCEDURE — 6370000000 HC RX 637 (ALT 250 FOR IP): Performed by: SURGERY

## 2024-06-14 RX ORDER — BUDESONIDE 0.5 MG/2ML
0.5 INHALANT ORAL
Status: DISCONTINUED | OUTPATIENT
Start: 2024-06-14 | End: 2024-06-14 | Stop reason: HOSPADM

## 2024-06-14 RX ORDER — ARFORMOTEROL TARTRATE 15 UG/2ML
15 SOLUTION RESPIRATORY (INHALATION)
Status: DISCONTINUED | OUTPATIENT
Start: 2024-06-14 | End: 2024-06-14 | Stop reason: HOSPADM

## 2024-06-14 RX ADMIN — ACETAMINOPHEN 650 MG: 325 TABLET ORAL at 10:10

## 2024-06-14 NOTE — PROGRESS NOTES
Bedside and Verbal shift change report given to dawna rn (oncoming nurse) by hussain rn (offgoing nurse). Report included the following information Nurse Handoff Report.

## 2024-06-14 NOTE — PROGRESS NOTES
Doing well, no complaints    AVSS    ERIK serosanguineous    Flaps ok, no hematoma,     Ok to DC  Follow up for drain removal

## 2024-06-14 NOTE — OP NOTE
[address]  [address]                            OPERATIVE REPORT      PATIENT NAME: DOMINGA JAMES             : 1950  MED REC NO: [id number]                       ROOM: 303  ACCOUNT NO: [id number]                       ADMIT DATE: 2024  PROVIDER: Antony Hazel Jr, MD    DATE OF SERVICE:  2024    PREOPERATIVE DIAGNOSES:  Carcinoma of the left breast with positive nodes.    POSTOPERATIVE DIAGNOSES:  Carcinoma of the left breast with positive nodes.    PROCEDURES PERFORMED:  left modified radical mastectomy    SURGEON:  Antony Hazel Jr, MD    ASSISTANT:  Ward Moctezuma SA    ANESTHESIA:  General.    ESTIMATED BLOOD LOSS:  Minimal.    SPECIMENS REMOVED:  Left breast and left axillary lymph nodes.    INTRAOPERATIVE FINDINGS:  Left breast with multiple suspicious left axillary lymph nodes.     COMPLICATIONS:  None.    IMPLANTS:  None.    INDICATIONS:  The patient is a 74-year-old female with a node positive high-risk carcinoma of the breast, who has opted to proceed with surgery and avoid chemotherapy.    DESCRIPTION OF PROCEDURE:  Left modified radical mastectomy.    DESCRIPTION OF PROCEDURE:  After the satisfactory induction of general endotracheal anesthesia, the patient was prepped and draped in the usual sterile fashion.  An elliptical incision was made around the left breast and deepened through the subcutaneous tissues to Bovie cautery.  The skin flaps were raised superior to the clavicle, medial to the sternum, inferior to the inframammary fold, and lateral to the latissimus dorsi muscle.  The breast was removed off chest wall subfascially maintaining hemostasis with the Bovie cautery.  At the lateral border, the pectoralis major muscle of the breast was mobilized, and the axilla was entered.  As you know, lymph node dissection was performed by sweeping the axillary contents off the bottom portion of the

## 2024-06-14 NOTE — PROGRESS NOTES
I have reviewed the provider's instructions with the patient, answering all questions to her satisfaction. Including drain care and emptying.     1015- Pt with volunteers to go off floor for D/C.

## 2024-06-21 ENCOUNTER — TELEPHONE (OUTPATIENT)
Age: 74
End: 2024-06-21

## 2024-06-24 ENCOUNTER — TELEPHONE (OUTPATIENT)
Age: 74
End: 2024-06-24

## 2024-06-24 NOTE — TELEPHONE ENCOUNTER
Patient called asking if she should schedule a follow up appointment.  I told her she should and transferred her to the front to schedule.   She also asked about stretching/exercising, I told her we have handouts I could send to her, she asked me to email them.  I also asked her about her drain, she is still draining 20-30cc q6hrs.

## 2024-07-10 ENCOUNTER — OFFICE VISIT (OUTPATIENT)
Age: 74
End: 2024-07-10

## 2024-07-10 VITALS — BODY MASS INDEX: 33.31 KG/M2 | HEIGHT: 62 IN | WEIGHT: 181 LBS

## 2024-07-10 DIAGNOSIS — C50.912 INVASIVE DUCTAL CARCINOMA OF BREAST, LEFT (HCC): Primary | ICD-10-CM

## 2024-07-10 PROCEDURE — 99024 POSTOP FOLLOW-UP VISIT: CPT | Performed by: SURGERY

## 2024-07-10 NOTE — PROGRESS NOTES
Mammogram Result (most recent):  MAMMOGRAM POST BX CLIP PLACEMENT BILATERAL 05/15/2024    Addendum 5/22/2024  3:15 PM  Addendum:  PATHOLOGY CORRELATION  .  FINDINGS:  Right breast: Benign breast tissue.  Left breast: Benign breast tissue with fibroadenomatoid change and fibrocystic  changes including florid usual ductal hyperplasia.  .  IMPRESSION:  These are concordant results.    RECOMMENDATION:  Clinical management.    The findings of this biopsy and recommendation were discussed with the patient  by telephone by myself.    Narrative  MRI BIOPSY BREAST W LOC DEVICE RIGHT 1ST LESION, MAMMOGRAM POST BX CLIP  PLACEMENT BILATERAL, MRI BIOPSY BREAST W LOC DEVICE LEFT 1ST LESION, 5/15/2024  1:50 PM  INDICATION: Other abnormal and inconclusive findings on diagnostic imaging of  breast  COMPARISON: MRI of the breast, 4/24/2024  .  PROCEDURE: After obtaining informed consent, and performing a \"time-out\"  procedure, preliminary T1-weighted sagittal MR imaging of the breast was  performed before and after the intravenous administration of contrast. The  nonmasslike enhancement in each breast was targeted.  .  In each breast:  The breast was prepped with Chlorhexidine. 1% lidocaine was injected locally in  the skin. Lidocaine with epinephrine was injected into the deep tissues of the  breast. A skin incision was made, thereafter, a Suros introducer was advanced  into the breast using an introducer grid. The needle was removed and replaced  with a blunt obturator. MR imaging of the breast was then repeated. The images  demonstrated accurate positioning of the introducer. A Suros vacuum assisted  biopsy needle was then inserted through the introducer. Multiple core samples  were obtained; thereafter; a biopsy clip was deployed at the biopsy site to kizzy  the biopsy site for future localization purposes. The introducer was then  removed.  Repeat MR imaging demonstrated accurate sampling of the targeted  lesion.  .  The titanium

## 2024-07-10 NOTE — PROGRESS NOTES
HISTORY OF PRESENT ILLNESS  Erica Holly is a 74 y.o. female     HPI ESTABLISHED patient here for POST OP visit  S/P LEFT breast mastectomy . Pt doing well. 5 to 7 ml drainage. Denies any pain. Range of motion is off.     Drain removed today.      4/8/24: LEFT breast, 1 o'clock position; needle bx: IDC with squamous/ metaplastic features and necrosis, histologic grade 3. Size of largest viable invasive tumor focus in one tissue core: 3.5 mm. ER-/NM-/HER2-, Ki-67 80%     5/14/24: LEFT axilla, biopsy: Metastatic carcinoma. Metastatic deposit measures 3 mm in greatest linear extent. No extranodal extension identified.      6/13/24: LEFT breast modified radial mastectomy PATH: IDC, gr 3, measuring 40mm in max dimension. DCIS present,  cribriform and papillary patterns, high nuclear grade. Negative margins. Metastatic carcinoma in one of twelve lymph nodes (1/12). pT2, pN1a. ER-/NM-/HER2-        Review of Systems      Physical Exam       ASSESSMENT and PLAN  {Assessment and Plan Chronic Disease:[id number]}

## 2024-07-16 ENCOUNTER — OFFICE VISIT (OUTPATIENT)
Age: 74
End: 2024-07-16
Payer: MEDICARE

## 2024-07-16 VITALS
OXYGEN SATURATION: 96 % | WEIGHT: 181.9 LBS | SYSTOLIC BLOOD PRESSURE: 116 MMHG | DIASTOLIC BLOOD PRESSURE: 73 MMHG | HEART RATE: 86 BPM | HEIGHT: 62 IN | RESPIRATION RATE: 16 BRPM | BODY MASS INDEX: 33.47 KG/M2

## 2024-07-16 DIAGNOSIS — C50.919 TRIPLE NEGATIVE BREAST CANCER (HCC): Primary | ICD-10-CM

## 2024-07-16 DIAGNOSIS — Z90.12 S/P MASTECTOMY, LEFT: ICD-10-CM

## 2024-07-16 PROCEDURE — 3074F SYST BP LT 130 MM HG: CPT | Performed by: INTERNAL MEDICINE

## 2024-07-16 PROCEDURE — 99214 OFFICE O/P EST MOD 30 MIN: CPT | Performed by: INTERNAL MEDICINE

## 2024-07-16 PROCEDURE — 3078F DIAST BP <80 MM HG: CPT | Performed by: INTERNAL MEDICINE

## 2024-07-16 PROCEDURE — 1123F ACP DISCUSS/DSCN MKR DOCD: CPT | Performed by: INTERNAL MEDICINE

## 2024-07-16 NOTE — PROGRESS NOTES
Erica Holly is a 74 y.o. female    Chief Complaint   Patient presents with    Follow-up       /73   Pulse 86   Resp 16   Ht 1.575 m (5' 2\")   Wt 82.5 kg (181 lb 14.4 oz)   SpO2 96%   BMI 33.27 kg/m²         1. Have you been to the ER, urgent care clinic since your last visit?  Hospitalized since your last visit? No    2. Have you seen or consulted any other health care providers outside of the [facility] since your last visit?  Include any pap smears or colon screening. No    Learning Assessment:       No data to display                Fall Risk Assessment:      7/25/2023    10:50 AM 4/27/2022     9:50 AM 5/3/2021     4:08 PM   Amb Fall Risk Assessment and TUG Test   Do you feel unsteady or are you worried about falling?  no     2 or more falls in past year? no     Fall with injury in past year? no     Fall in past 12 months?  0 0   Able to walk?  Yes Yes       Abuse Screening:       No data to display                ADL Screening:       No data to display                
based on final pathology  results.    Records reviewed and summarized above.  Pathology report(s) reviewed above.  Radiology report(s) reviewed above.      Assessment:/PLAN     1)  LEFT breast cancer post mastectomy 6/13/24  pT Category:  pT2        pN Category:  pN1a  clinical T2N1 LEFT breast cancer post breast biopsy only 4/8/24  ER          [place]   Interpretation:     Negative     Interpretation:     Negative   Nuclear Staining %:     0%     Nuclear Staining %:     0%   Intensity:     NA     Intensity     NA   HER-2/abilio  Immunohistochemistry for Breast tumors   Results:     Negative, Score = 0   Ki-67 Immunohistochemical Assay   Result:          80% nuclear staining in invasive carcinoma        Seen today for fu to discuss adjuvant systemic therapy options.   Feels fine. Healing.  No pain.   Post mastectomy 6/13/24. Path with 40mm IDC with 1/12LN+.   Reviewed path today with pt and .   Discussed triple neg path again today.   Discussed no adjuvant hormonal therapy options.   Discussed adjuvant chemo.   Pt is not sure she wants chemo.   Wants least amount possible.   Will give TC chemo info.   Pt wants to think about chemo.   Wants to fu in 2 weeks.     2) HTN/arthritis.  per IM.     3) FamHX.  None.   Aunt cancer not breast.     4) Psychosocial. Mood good. Coping well.   Has children that are supportive.   Not working now. 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       Fu here in 2 weeks   Call if questions    I appreciate the opportunity to participate in Ms. Erica Holly's care.    Signed By: Jessa Woods,

## 2024-07-26 NOTE — PROGRESS NOTES
[facility]  [address], [address] [place] [address]  W: [phone]  F: [phone]    Reason for Visit:   Erica Holly is a 74 y.o. female who is seen fu breast cancer.    Treatment History:   4/8/24: LEFT breast, 1 o'clock position; needle bx: IDC with squamous/ metaplastic features and necrosis, histologic grade 3. Size of largest viable invasive tumor focus in one tissue core: 3.5 mm. ER-/IL-/HER2-, Ki-67 80%     5/14/24  Left axilla, biopsy:        Metastatic carcinoma.   Metastatic deposit measures 3 mm in greatest linear extent.   No extranodal extension identified     5/15/24  FINAL PATHOLOGIC DIAGNOSIS   1.  Left breast, site A, biopsy:        Benign breast tissue with fibroadenomatoid change and fibrocystic   changes including florid usual ductal hyperplasia.   Negative for malignancy or atypia.     2.  Right breast, site B, biopsy:        Benign breast tissue.   Negative for malignancy or atypia.     6/13/24: LEFT breast modified radial mastectomy PATH: IDC, gr 3, measuring 40mm in max dimension. DCIS present, cribriform and papillary patterns, high nuclear grade. Negative margins. Metastatic carcinoma in one of twelve lymph nodes (1/12). pT2, pN1a. ER-/IL-/HER2-       STAGE: pT Category:  pT2        pN Category:  pN1a     clinical T2N1 triple negative  R          [place]   Interpretation:     Negative     Interpretation:     Negative   Nuclear Staining %:     0%     Nuclear Staining %:     0%   Intensity:     NA     Intensity     NA   HER-2/abilio  Immunohistochemistry for Breast tumors   Results:     Negative, Score = 0   Ki-67 Immunohistochemical Assay   Result:          80% nuclear staining in invasive carcinoma       History of Present Illness:     Pt seen today for office fu for triple negative breast cancer post mastectomy 6/13/24.    Here again to discuss systemic therapy options.   Unsure about doing any chemo. Still wants to think about chemo  Still

## 2024-07-29 ENCOUNTER — OFFICE VISIT (OUTPATIENT)
Age: 74
End: 2024-07-29
Payer: MEDICARE

## 2024-07-29 ENCOUNTER — TELEPHONE (OUTPATIENT)
Age: 74
End: 2024-07-29

## 2024-07-29 VITALS
BODY MASS INDEX: 32.74 KG/M2 | DIASTOLIC BLOOD PRESSURE: 80 MMHG | SYSTOLIC BLOOD PRESSURE: 131 MMHG | TEMPERATURE: 98.1 F | HEART RATE: 76 BPM | OXYGEN SATURATION: 96 % | RESPIRATION RATE: 18 BRPM | WEIGHT: 179 LBS

## 2024-07-29 DIAGNOSIS — C50.919 TRIPLE NEGATIVE BREAST CANCER (HCC): Primary | ICD-10-CM

## 2024-07-29 DIAGNOSIS — I10 PRIMARY HYPERTENSION: ICD-10-CM

## 2024-07-29 DIAGNOSIS — Z90.12 S/P MASTECTOMY, LEFT: ICD-10-CM

## 2024-07-29 PROCEDURE — 3079F DIAST BP 80-89 MM HG: CPT | Performed by: INTERNAL MEDICINE

## 2024-07-29 PROCEDURE — 99214 OFFICE O/P EST MOD 30 MIN: CPT | Performed by: INTERNAL MEDICINE

## 2024-07-29 PROCEDURE — 1123F ACP DISCUSS/DSCN MKR DOCD: CPT | Performed by: INTERNAL MEDICINE

## 2024-07-29 PROCEDURE — 3075F SYST BP GE 130 - 139MM HG: CPT | Performed by: INTERNAL MEDICINE

## 2024-07-29 NOTE — PROGRESS NOTES
Ercia Holly is a 74 y.o. female    Chief Complaint   Patient presents with    Follow-up     breast cancer       1. Have you been to the ER, urgent care clinic since your last visit?  Hospitalized since your last visit?No    2. Have you seen or consulted any other health care providers outside of the [facility] since your last visit?  Include any pap smears or colon screening. Yes, Dental

## 2024-07-29 NOTE — TELEPHONE ENCOUNTER
Called patient advised it was okay to update follow up appt to a vv. Provided c/b # if further assistance is needed.

## 2024-08-01 DIAGNOSIS — I10 ESSENTIAL (PRIMARY) HYPERTENSION: ICD-10-CM

## 2024-08-01 RX ORDER — SPIRONOLACTONE 25 MG/1
25 TABLET ORAL 2 TIMES DAILY
Qty: 180 TABLET | Refills: 1 | Status: SHIPPED | OUTPATIENT
Start: 2024-08-01

## 2024-08-07 ENCOUNTER — TELEPHONE (OUTPATIENT)
Age: 74
End: 2024-08-07

## 2024-08-07 NOTE — TELEPHONE ENCOUNTER
FU call to patient, ID verified X 2, [phone].  Patient had been scheduled for VV with Provider this morning, but was unable to activate microphone on her phone to have visit for chemo discussion.   Patient has been rescheduled for in office visit for 8/15/24.  Update to Provider.

## 2024-08-15 ENCOUNTER — OFFICE VISIT (OUTPATIENT)
Age: 74
End: 2024-08-15
Payer: MEDICARE

## 2024-08-15 VITALS
HEART RATE: 74 BPM | RESPIRATION RATE: 18 BRPM | TEMPERATURE: 98.2 F | OXYGEN SATURATION: 95 % | WEIGHT: 183 LBS | DIASTOLIC BLOOD PRESSURE: 75 MMHG | BODY MASS INDEX: 33.47 KG/M2 | SYSTOLIC BLOOD PRESSURE: 132 MMHG

## 2024-08-15 DIAGNOSIS — Z90.12 S/P MASTECTOMY, LEFT: ICD-10-CM

## 2024-08-15 DIAGNOSIS — I10 PRIMARY HYPERTENSION: ICD-10-CM

## 2024-08-15 DIAGNOSIS — C50.919 TRIPLE NEGATIVE BREAST CANCER (HCC): Primary | ICD-10-CM

## 2024-08-15 PROCEDURE — 3075F SYST BP GE 130 - 139MM HG: CPT | Performed by: INTERNAL MEDICINE

## 2024-08-15 PROCEDURE — 3078F DIAST BP <80 MM HG: CPT | Performed by: INTERNAL MEDICINE

## 2024-08-15 PROCEDURE — 99214 OFFICE O/P EST MOD 30 MIN: CPT | Performed by: INTERNAL MEDICINE

## 2024-08-15 PROCEDURE — 1123F ACP DISCUSS/DSCN MKR DOCD: CPT | Performed by: INTERNAL MEDICINE

## 2024-08-15 NOTE — PROGRESS NOTES
Erica Holly is a 74 y.o. female    Chief Complaint   Patient presents with    Follow-up     breast cancer       1. Have you been to the ER, urgent care clinic since your last visit?  Hospitalized since your last visit?No    2. Have you seen or consulted any other health care providers outside of the [facility] since your last visit?  Include any pap smears or colon screening. No      
results.    RECOMMENDATION:  Clinical management.    The findings of this biopsy and recommendation were discussed with the patient  by telephone by myself.    Narrative  MRI BIOPSY BREAST W LOC DEVICE RIGHT 1ST LESION, MAMMOGRAM POST BX CLIP  PLACEMENT BILATERAL, MRI BIOPSY BREAST W LOC DEVICE LEFT 1ST LESION, 5/15/2024  1:50 PM  INDICATION: Other abnormal and inconclusive findings on diagnostic imaging of  breast  COMPARISON: MRI of the breast, 4/24/2024  .  PROCEDURE: After obtaining informed consent, and performing a \"time-out\"  procedure, preliminary T1-weighted sagittal MR imaging of the breast was  performed before and after the intravenous administration of contrast. The  nonmasslike enhancement in each breast was targeted.  .  In each breast:  The breast was prepped with Chlorhexidine. 1% lidocaine was injected locally in  the skin. Lidocaine with epinephrine was injected into the deep tissues of the  breast. A skin incision was made, thereafter, a Suros introducer was advanced  into the breast using an introducer grid. The needle was removed and replaced  with a blunt obturator. MR imaging of the breast was then repeated. The images  demonstrated accurate positioning of the introducer. A Suros vacuum assisted  biopsy needle was then inserted through the introducer. Multiple core samples  were obtained; thereafter; a biopsy clip was deployed at the biopsy site to kizzy  the biopsy site for future localization purposes. The introducer was then  removed.  Repeat MR imaging demonstrated accurate sampling of the targeted  lesion.  .  The titanium pellet placements and positions were confirmed with post-procedure  digital mammograms.  .  The patient tolerated the procedure well without apparent complications.  The  samples were submitted to pathology for analysis.  .    Impression  Uncomplicated core needle biopsy of each breast with accurate clip placement and  pathology pending. Further recommendations will be

## 2024-08-27 ENCOUNTER — HOSPITAL ENCOUNTER (OUTPATIENT)
Facility: HOSPITAL | Age: 74
Discharge: HOME | End: 2024-08-30

## 2024-08-27 ENCOUNTER — CLINICAL DOCUMENTATION (OUTPATIENT)
Facility: HOSPITAL | Age: 74
End: 2024-08-27

## 2024-08-27 VITALS
DIASTOLIC BLOOD PRESSURE: 85 MMHG | SYSTOLIC BLOOD PRESSURE: 156 MMHG | BODY MASS INDEX: 33.68 KG/M2 | HEART RATE: 75 BPM | WEIGHT: 183 LBS | RESPIRATION RATE: 18 BRPM | TEMPERATURE: 97.6 F | HEIGHT: 62 IN

## 2024-08-27 ASSESSMENT — PAIN SCALES - GENERAL: PAINLEVEL_OUTOF10: 0

## 2024-08-27 NOTE — PROGRESS NOTES
[facility]  Oncology Social Work Encounter    [] Med-Onc [facility] [] Med-Onc [facility] [] Med-Onc [facility] [] Rad-Onc [facility] [x] Rad-Onc [facility] [] Rad-Onc [facility] [] Rad-Onc [facility] [] Breast Center    Patient: Erica Holly    Encounter Type:    [x] Initial SW Encounter  [] Patient Initiated  [] Referral  [] Distress/PHQ Screening  [] Other:      Concern(s)/Barrier(s) to Care:     Narrative: Met with patient to introduce  role and support. She shared she lives with her spouse. 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 She endorsed having great support. No questions or concerns to report to . Distress Thermometer score 0. Reviewed support resources with patient including [facility] center and support groups. Provided my contact information and encouraged patient to contact me as needed for ongoing psychosocial support, patient voiced understanding.    Referral/Handouts: Complimentary therapies referral  Support Groups referral        Plan: Follow up as needed    Mally Turner LMSW Social Work Navigator, Radiation Oncology  [facility]  [facility]  W: [phone] F: [phone]

## 2024-08-27 NOTE — CONSULTS
companies over the years  Is active, walks nearly daily and also does some regular light exercise    GYN/Female History:   Menarche/Menopause: 13/late 40s    Hormone Replacement Therapy: No  Fertility Drugs: No    From a performance status standpoint, she is able to perform ADLs and IADLs independently.   Ms. Holly denies a history of inflammatory bowel disease, scleroderma or other collagen vascular diseases, pacemaker/AICD, or history of prior irradiation.      Review of Systems:  A complete review of systems was obtained and negative except as described above.    Allergies and Medications     Allergies   Allergen Reactions    Ace Inhibitors Cough    Ibuprofen Hives and Swelling       Current Outpatient Medications   Medication Instructions    amLODIPine (NORVASC) 10 mg, Oral, DAILY    aspirin 81 mg, Oral, DAILY    atorvastatin (LIPITOR) 20 mg, Oral, DAILY    Cranial Prosthesis MISC Does not apply, Wig for breast cancer chemo    spironolactone (ALDACTONE) 25 mg, Oral, 2 TIMES DAILY    TRELEGY ELLIPTA 100-62.5-25 MCG/ACT AEPB inhaler No dose, route, or frequency recorded.      Past Medical/Surgical History     Past Medical History:   Diagnosis Date    Arthritis     Breast cancer (HCC)     LEFT BREAST    COVID     NO ISSUES    DVT (deep venous thrombosis) (HCC)     Hyperlipidemia     Hypertension 6/15/2010    TIA (transient ischemic attack)     NO RESIDUAL     Past Surgical History:   Procedure Laterality Date    BREAST BIOPSY      neg    MASTECTOMY, MODIFIED RADICAL Left 2024    LEFT BREAST MODIFIED RADICAL MASTECTOMY performed by Antony Hazel Jr, MD at [facility] AMBULATORY OR    MOUTH SURGERY      ALL TOP TEETH REMOVED AND WEARS A DENTURE    MRI BREAST BX USING DEVICE LEFT Left 05/15/2024    MRI BREAST BX USING DEVICE LEFT 5/15/2024 [facility] PanX MRI    MRI BREAST BX USING DEVICE RIGHT Right 05/15/2024    MRI BREAST BX USING DEVICE RIGHT 5/15/2024 [facility] PanX MRI    TUBAL  Year: No     Physical Exam   Vitals: Blood pressure (!) 156/85, pulse 75, temperature 97.6 °F (36.4 °C), resp. rate 18, height 1.575 m (5' 2\"), weight 83 kg (183 lb). Pain Level: 0  Ms. Holly has elevated blood pressure today. Due to the elevated BP reading today, the following recommendations have been made: referred to Alternative/PCP.  Performance Status: ECOG 0, fully active, able to carry on all predisease activities without restrictions  Constitutional: Pleasant, sitting comfortably in no acute distress.   HEENT: Moist mucous membranes.  Cardiac: Good peripheral perfusion, no upper or lower extremity edema bilaterally.  Pulmonary: No increased work of breathing. Symmetric chest rise  Breast: The patient is s/p left MRM and ALND. Surgical incisions are healing well. There are no palpable masses or skin changes (skin nodules/skin thickening) noted to the left chest wall or the right breast. No cervical axillary or supraclavicular lymphadenopathy noted bilaterally.  Skin: Warm, dry, no rashes noted.  Neurologic: Alert and oriented.  Psychiatric: Cooperative to commands and responds to questions appropriately.    Imaging:   Imaging reports were reviewed as detailed in her history above      Impression:   Ms. Holly is a 74 y.o. female with a history of cT2N1 IDC of the left breast (UOQ), triple negative, Ki78 80%, Gr3.  S/p left mastectomy and ALND, final path IDC, Gr3, 4cm in max dimension +Gr3 DCIS (cribiform/papillary), triple negative. All margins widely negative (25mm closest). 1/12 LN + (1cm, no NATALIIA). +LVSI. pT2N1a. Has been recommended chemotherapy but has decided against it due to toxicity concerns and also due to dental issues which repairing would be delayed by adjuvant systemic therapy.    Treatment Intent: Definitive    Counseling:   Ms. Holly's pertinent history, treatment details and imaging findings were discussed with her today. The natural history of breast cancer was reviewed with the patient.

## 2024-08-27 NOTE — PROGRESS NOTES
[facility] Distress Thermometer    Date Screening Completed: 8/27/24    Screening Declined:  [] Yes    Number that best describes how much distress you've experienced in the past week, including today?  0 [x] - No distress 1 []      2 []      3 []      4 []       5 []       6 []      7 []      8 []      9 []       10 [] - Extreme distress    PROBLEM LIST  Have you had concerns about any of the items below in the past week, including today?      Physical Concerns Practical Concerns   [] Pain [] Taking care of myself    [] Sleep [] Taking care of others    [] Fatigue [] Work   [] Tobacco use  [] School   [] Substance use  [] Housing   [] Memory or concentration [] Finances   [] Sexual health [] Insurance   [] Changes in eating  [] Transportation   [] Loss or change of physical abilities  []     [] Having enough food   Emotional Concerns [] Access to medicine   [] Worry or anxiety [] Treatment decisions   [] Sadness or depression    [] Loss of interest or enjoyment  Spiritual or Gnosticist Concerns   [] Grief or loss  [] Sense of meaning or purpose   [] Fear [] Changes in roger or beliefs   [] Loneliness  [] Death, dying, or afterlife   [] Anger [] Conflict between beliefs and cancer treatments    [] Changes in appearance [] Relationship with the sacred   [] Feelings of worthlessness or being a burden [] Ritual or dietary needs        Social Concerns     [] Relationship with spouse or partner     [] Relationship with children    [] Relationship with family members     [] Relationship with friends or coworkers     [] Communication with health care team     [] Ability to have children     [] Prejudice or discrimination        Other Concerns:     Patient received resource information and education:  [x] Yes  [] No

## 2024-09-09 ENCOUNTER — HOSPITAL ENCOUNTER (OUTPATIENT)
Facility: HOSPITAL | Age: 74
Discharge: HOME | End: 2024-09-12
Attending: EMERGENCY MEDICINE

## 2024-09-12 ENCOUNTER — TELEPHONE (OUTPATIENT)
Age: 74
End: 2024-09-12

## 2024-09-12 DIAGNOSIS — I10 ESSENTIAL (PRIMARY) HYPERTENSION: ICD-10-CM

## 2024-09-13 RX ORDER — AMLODIPINE BESYLATE 10 MG/1
10 TABLET ORAL DAILY
Qty: 90 TABLET | Refills: 1 | Status: SHIPPED | OUTPATIENT
Start: 2024-09-13

## 2024-09-19 DIAGNOSIS — E78.5 DYSLIPIDEMIA, GOAL LDL BELOW 100: ICD-10-CM

## 2024-09-19 RX ORDER — ATORVASTATIN CALCIUM 20 MG/1
20 TABLET, FILM COATED ORAL DAILY
Qty: 90 TABLET | Refills: 1 | Status: SHIPPED | OUTPATIENT
Start: 2024-09-19

## 2024-09-23 ENCOUNTER — TELEPHONE (OUTPATIENT)
Age: 74
End: 2024-09-23

## 2024-10-07 SDOH — ECONOMIC STABILITY: TRANSPORTATION INSECURITY
IN THE PAST 12 MONTHS, HAS LACK OF TRANSPORTATION KEPT YOU FROM MEETINGS, WORK, OR FROM GETTING THINGS NEEDED FOR DAILY LIVING?: NO

## 2024-10-07 SDOH — ECONOMIC STABILITY: FOOD INSECURITY: WITHIN THE PAST 12 MONTHS, YOU WORRIED THAT YOUR FOOD WOULD RUN OUT BEFORE YOU GOT MONEY TO BUY MORE.: NEVER TRUE

## 2024-10-07 SDOH — ECONOMIC STABILITY: INCOME INSECURITY: HOW HARD IS IT FOR YOU TO PAY FOR THE VERY BASICS LIKE FOOD, HOUSING, MEDICAL CARE, AND HEATING?: NOT HARD AT ALL

## 2024-10-07 SDOH — ECONOMIC STABILITY: FOOD INSECURITY: WITHIN THE PAST 12 MONTHS, THE FOOD YOU BOUGHT JUST DIDN'T LAST AND YOU DIDN'T HAVE MONEY TO GET MORE.: NEVER TRUE

## 2024-10-08 ENCOUNTER — OFFICE VISIT (OUTPATIENT)
Age: 74
End: 2024-10-08
Payer: MEDICARE

## 2024-10-08 VITALS
HEIGHT: 62 IN | HEART RATE: 84 BPM | SYSTOLIC BLOOD PRESSURE: 128 MMHG | WEIGHT: 180 LBS | DIASTOLIC BLOOD PRESSURE: 77 MMHG | BODY MASS INDEX: 33.13 KG/M2 | TEMPERATURE: 97.6 F | OXYGEN SATURATION: 98 % | RESPIRATION RATE: 16 BRPM

## 2024-10-08 DIAGNOSIS — N18.31 STAGE 3A CHRONIC KIDNEY DISEASE (HCC): ICD-10-CM

## 2024-10-08 DIAGNOSIS — Z86.73 HISTORY OF STROKE: Primary | ICD-10-CM

## 2024-10-08 DIAGNOSIS — Z71.85 VACCINE COUNSELING: ICD-10-CM

## 2024-10-08 DIAGNOSIS — I10 ESSENTIAL (PRIMARY) HYPERTENSION: ICD-10-CM

## 2024-10-08 DIAGNOSIS — I73.9 PERIPHERAL VASCULAR DISEASE, UNSPECIFIED (HCC): ICD-10-CM

## 2024-10-08 DIAGNOSIS — E78.5 DYSLIPIDEMIA, GOAL LDL BELOW 100: ICD-10-CM

## 2024-10-08 PROCEDURE — 99214 OFFICE O/P EST MOD 30 MIN: CPT | Performed by: INTERNAL MEDICINE

## 2024-10-08 PROCEDURE — 3078F DIAST BP <80 MM HG: CPT | Performed by: INTERNAL MEDICINE

## 2024-10-08 PROCEDURE — 3074F SYST BP LT 130 MM HG: CPT | Performed by: INTERNAL MEDICINE

## 2024-10-08 PROCEDURE — 1123F ACP DISCUSS/DSCN MKR DOCD: CPT | Performed by: INTERNAL MEDICINE

## 2024-10-08 PROCEDURE — G2211 COMPLEX E/M VISIT ADD ON: HCPCS | Performed by: INTERNAL MEDICINE

## 2024-10-08 RX ORDER — ATORVASTATIN CALCIUM 20 MG/1
20 TABLET, FILM COATED ORAL DAILY
Qty: 90 TABLET | Refills: 1 | Status: SHIPPED | OUTPATIENT
Start: 2024-10-08

## 2024-10-08 RX ORDER — AMLODIPINE BESYLATE 10 MG/1
10 TABLET ORAL DAILY
Qty: 90 TABLET | Refills: 1 | Status: SHIPPED | OUTPATIENT
Start: 2024-10-08

## 2024-10-09 ENCOUNTER — HOSPITAL ENCOUNTER (OUTPATIENT)
Facility: HOSPITAL | Age: 74
Discharge: HOME | End: 2024-10-12
Attending: EMERGENCY MEDICINE

## 2024-10-09 LAB
ALBUMIN SERPL-MCNC: 4 G/DL (ref 3.8–4.8)
ALP SERPL-CCNC: 126 IU/L (ref 44–121)
ALT SERPL-CCNC: 16 IU/L (ref 0–32)
AST SERPL-CCNC: 23 IU/L (ref 0–40)
BASOPHILS # BLD AUTO: 0 X10E3/UL (ref 0–0.2)
BASOPHILS NFR BLD AUTO: 0 %
BILIRUB SERPL-MCNC: 0.7 MG/DL (ref 0–1.2)
BUN SERPL-MCNC: 12 MG/DL (ref 8–27)
BUN/CREAT SERPL: 10 (ref 12–28)
CALCIUM SERPL-MCNC: 9.2 MG/DL (ref 8.7–10.3)
CHLORIDE SERPL-SCNC: 102 MMOL/L (ref 96–106)
CHOLEST SERPL-MCNC: 150 MG/DL (ref 100–199)
CO2 SERPL-SCNC: 23 MMOL/L (ref 20–29)
CREAT SERPL-MCNC: 1.15 MG/DL (ref 0.57–1)
EGFRCR SERPLBLD CKD-EPI 2021: 50 ML/MIN/1.73
EOSINOPHIL # BLD AUTO: 0.2 X10E3/UL (ref 0–0.4)
EOSINOPHIL NFR BLD AUTO: 2 %
ERYTHROCYTE [DISTWIDTH] IN BLOOD BY AUTOMATED COUNT: 13.2 % (ref 11.7–15.4)
GLOBULIN SER CALC-MCNC: 3.4 G/DL (ref 1.5–4.5)
GLUCOSE SERPL-MCNC: 89 MG/DL (ref 70–99)
HCT VFR BLD AUTO: 39.5 % (ref 34–46.6)
HDLC SERPL-MCNC: 47 MG/DL
HGB BLD-MCNC: 12.6 G/DL (ref 11.1–15.9)
IMM GRANULOCYTES # BLD AUTO: 0 X10E3/UL (ref 0–0.1)
IMM GRANULOCYTES NFR BLD AUTO: 0 %
IMP & REVIEW OF LAB RESULTS: NORMAL
LDLC SERPL CALC-MCNC: 85 MG/DL (ref 0–99)
LYMPHOCYTES # BLD AUTO: 1.8 X10E3/UL (ref 0.7–3.1)
LYMPHOCYTES NFR BLD AUTO: 26 %
MCH RBC QN AUTO: 29.1 PG (ref 26.6–33)
MCHC RBC AUTO-ENTMCNC: 31.9 G/DL (ref 31.5–35.7)
MCV RBC AUTO: 91 FL (ref 79–97)
MONOCYTES # BLD AUTO: 0.7 X10E3/UL (ref 0.1–0.9)
MONOCYTES NFR BLD AUTO: 11 %
NEUTROPHILS # BLD AUTO: 4.2 X10E3/UL (ref 1.4–7)
NEUTROPHILS NFR BLD AUTO: 61 %
PLATELET # BLD AUTO: 289 X10E3/UL (ref 150–450)
POTASSIUM SERPL-SCNC: 3.8 MMOL/L (ref 3.5–5.2)
PROT SERPL-MCNC: 7.4 G/DL (ref 6–8.5)
RAD ONC ARIA COURSE FIRST TREATMENT DATE: NORMAL
RAD ONC ARIA COURSE ID: NORMAL
RAD ONC ARIA COURSE INTENT: NORMAL
RAD ONC ARIA COURSE LAST TREATMENT DATE: NORMAL
RAD ONC ARIA COURSE SESSION NUMBER: 1
RAD ONC ARIA COURSE START DATE: NORMAL
RAD ONC ARIA COURSE TREATMENT ELAPSED DAYS: 0
RAD ONC ARIA PLAN FRACTIONS TREATED TO DATE: 1
RAD ONC ARIA PLAN ID: NORMAL
RAD ONC ARIA PLAN PRESCRIBED DOSE PER FRACTION: 1.8 GY
RAD ONC ARIA PLAN PRIMARY REFERENCE POINT: NORMAL
RAD ONC ARIA PLAN TOTAL FRACTIONS PRESCRIBED: 28
RAD ONC ARIA PLAN TOTAL PRESCRIBED DOSE: 5040 CGY
RAD ONC ARIA REFERENCE POINT DOSAGE GIVEN TO DATE: 1.8 GY
RAD ONC ARIA REFERENCE POINT DOSAGE GIVEN TO DATE: 1.8 GY
RAD ONC ARIA REFERENCE POINT ID: NORMAL
RAD ONC ARIA REFERENCE POINT ID: NORMAL
RAD ONC ARIA REFERENCE POINT SESSION DOSAGE GIVEN: 1.8 GY
RAD ONC ARIA REFERENCE POINT SESSION DOSAGE GIVEN: 1.8 GY
RBC # BLD AUTO: 4.33 X10E6/UL (ref 3.77–5.28)
REPORT: NORMAL
REPORT: NORMAL
SODIUM SERPL-SCNC: 139 MMOL/L (ref 134–144)
TRIGL SERPL-MCNC: 95 MG/DL (ref 0–149)
VLDLC SERPL CALC-MCNC: 18 MG/DL (ref 5–40)
WBC # BLD AUTO: 7 X10E3/UL (ref 3.4–10.8)

## 2024-10-09 NOTE — PROGRESS NOTES
I have reviewed all needed documentation in preparation for visit. Verified patient by name and date of birth  Chief Complaint   Patient presents with    6 Month Follow-Up       There were no vitals filed for this visit.    Health Maintenance Due   Topic Date Due    Pneumococcal 65+ years Vaccine (1 of 2 - PCV) Never done    Shingles vaccine (1 of 2) Never done    Respiratory Syncytial Virus (RSV) Pregnant or age 60 yrs+ (1 - 1-dose 60+ series) Never done    Colorectal Cancer Screen  07/31/2023    Annual Wellness Visit (Medicare Advantage)  Never done    Flu vaccine (1) Never done    COVID-19 Vaccine (4 - 2023-24 season) 09/01/2024     \"Have you been to the ER, urgent care clinic since your last visit?  Hospitalized since your last visit?\"    YES - When: approximately 4 months ago.  Where and Why: [facility]  .    “Have you seen or consulted any other health care providers outside of [facility] since your last visit?”    NO        “Have you had a colorectal cancer screening such as a colonoscopy/FIT/Cologuard?    NO    Date of last Colonoscopy: 4/28/2010  Date of last Cologuard: 7/31/2020  No FIT/FOBT on file   No flexible sigmoidoscopy on file         Click Here for Release of Records Request         Osbaldo RUTHERFORD  
hyperlipidemia control uncertain, cerebrovascular disease well controlled, stable, knee arthritis bilat will eventually need replacement .but now breast cancer takers priority    PLAN:  current treatment plan is effective, no change in therapy  lab results and schedule of future lab studies reviewed with patient  repeat labs ordered prior to next appointment  orders and follow up as documented in patient record  reviewed diet, exercise and weight control  recommended sodium restriction  reviewed medications and side effects in detail  reviewed potential future medication changes and side effects  use of aspirin to prevent MI and TIA's discussed.  ASSESSMENT:.diagmed  1      Erica was seen today for 6 month follow-up.    Diagnoses and all orders for this visit:    History of stroke    Essential (primary) hypertension  -     amLODIPine (NORVASC) 10 MG tablet; Take 1 tablet by mouth daily  -     Cancel: CBC with Auto Differential; Future  -     Cancel: Lipid Panel; Future  -     Cancel: Comprehensive Metabolic Panel; Future  -     Comprehensive Metabolic Panel; Future  -     Lipid Panel; Future  -     CBC with Auto Differential; Future    Peripheral vascular disease, unspecified (HCC)    Stage 3a chronic kidney disease (HCC)  -     Cancel: Comprehensive Metabolic Panel; Future  -     Comprehensive Metabolic Panel; Future    Dyslipidemia, goal LDL below 100  -     atorvastatin (LIPITOR) 20 MG tablet; Take 1 tablet by mouth daily    Other orders  -     CBC with Auto Differential  -     Comprehensive Metabolic Panel  -     Lipid Panel  -     Cardiovascular Report  -     [facility] CKD Program      Hypertension is pretty well-controlled she is not having serious symptoms of peripheral vascular disease she has had no symptoms of coronary or cerebrovascular disease she is on a good strong statin as well as a good pressure program seems to work need to watch her potassium because of her history she should continue taking out

## 2024-10-10 ENCOUNTER — HOSPITAL ENCOUNTER (OUTPATIENT)
Facility: HOSPITAL | Age: 74
Discharge: HOME | End: 2024-10-13
Attending: EMERGENCY MEDICINE

## 2024-10-10 LAB
RAD ONC ARIA COURSE FIRST TREATMENT DATE: NORMAL
RAD ONC ARIA COURSE ID: NORMAL
RAD ONC ARIA COURSE INTENT: NORMAL
RAD ONC ARIA COURSE LAST TREATMENT DATE: NORMAL
RAD ONC ARIA COURSE SESSION NUMBER: 2
RAD ONC ARIA COURSE START DATE: NORMAL
RAD ONC ARIA COURSE TREATMENT ELAPSED DAYS: 1
RAD ONC ARIA PLAN FRACTIONS TREATED TO DATE: 2
RAD ONC ARIA PLAN ID: NORMAL
RAD ONC ARIA PLAN PRESCRIBED DOSE PER FRACTION: 1.8 GY
RAD ONC ARIA PLAN PRIMARY REFERENCE POINT: NORMAL
RAD ONC ARIA PLAN TOTAL FRACTIONS PRESCRIBED: 28
RAD ONC ARIA PLAN TOTAL PRESCRIBED DOSE: 5040 CGY
RAD ONC ARIA REFERENCE POINT DOSAGE GIVEN TO DATE: 3.6 GY
RAD ONC ARIA REFERENCE POINT DOSAGE GIVEN TO DATE: 3.6 GY
RAD ONC ARIA REFERENCE POINT ID: NORMAL
RAD ONC ARIA REFERENCE POINT ID: NORMAL
RAD ONC ARIA REFERENCE POINT SESSION DOSAGE GIVEN: 1.8 GY
RAD ONC ARIA REFERENCE POINT SESSION DOSAGE GIVEN: 1.8 GY

## 2024-10-11 ENCOUNTER — HOSPITAL ENCOUNTER (OUTPATIENT)
Facility: HOSPITAL | Age: 74
Discharge: HOME | End: 2024-10-14
Attending: EMERGENCY MEDICINE

## 2024-10-11 LAB
RAD ONC ARIA COURSE FIRST TREATMENT DATE: NORMAL
RAD ONC ARIA COURSE ID: NORMAL
RAD ONC ARIA COURSE INTENT: NORMAL
RAD ONC ARIA COURSE LAST TREATMENT DATE: NORMAL
RAD ONC ARIA COURSE SESSION NUMBER: 3
RAD ONC ARIA COURSE START DATE: NORMAL
RAD ONC ARIA COURSE TREATMENT ELAPSED DAYS: 2
RAD ONC ARIA PLAN FRACTIONS TREATED TO DATE: 3
RAD ONC ARIA PLAN ID: NORMAL
RAD ONC ARIA PLAN PRESCRIBED DOSE PER FRACTION: 1.8 GY
RAD ONC ARIA PLAN PRIMARY REFERENCE POINT: NORMAL
RAD ONC ARIA PLAN TOTAL FRACTIONS PRESCRIBED: 28
RAD ONC ARIA PLAN TOTAL PRESCRIBED DOSE: 5040 CGY
RAD ONC ARIA REFERENCE POINT DOSAGE GIVEN TO DATE: 5.4 GY
RAD ONC ARIA REFERENCE POINT DOSAGE GIVEN TO DATE: 5.4 GY
RAD ONC ARIA REFERENCE POINT ID: NORMAL
RAD ONC ARIA REFERENCE POINT ID: NORMAL
RAD ONC ARIA REFERENCE POINT SESSION DOSAGE GIVEN: 1.8 GY
RAD ONC ARIA REFERENCE POINT SESSION DOSAGE GIVEN: 1.8 GY

## 2024-10-14 ENCOUNTER — HOSPITAL ENCOUNTER (OUTPATIENT)
Facility: HOSPITAL | Age: 74
Discharge: HOME | End: 2024-10-17
Attending: EMERGENCY MEDICINE

## 2024-10-14 LAB
RAD ONC ARIA COURSE FIRST TREATMENT DATE: NORMAL
RAD ONC ARIA COURSE ID: NORMAL
RAD ONC ARIA COURSE INTENT: NORMAL
RAD ONC ARIA COURSE LAST TREATMENT DATE: NORMAL
RAD ONC ARIA COURSE SESSION NUMBER: 4
RAD ONC ARIA COURSE START DATE: NORMAL
RAD ONC ARIA COURSE TREATMENT ELAPSED DAYS: 5
RAD ONC ARIA PLAN FRACTIONS TREATED TO DATE: 4
RAD ONC ARIA PLAN ID: NORMAL
RAD ONC ARIA PLAN PRESCRIBED DOSE PER FRACTION: 1.8 GY
RAD ONC ARIA PLAN PRIMARY REFERENCE POINT: NORMAL
RAD ONC ARIA PLAN TOTAL FRACTIONS PRESCRIBED: 28
RAD ONC ARIA PLAN TOTAL PRESCRIBED DOSE: 5040 CGY
RAD ONC ARIA REFERENCE POINT DOSAGE GIVEN TO DATE: 7.2 GY
RAD ONC ARIA REFERENCE POINT DOSAGE GIVEN TO DATE: 7.2 GY
RAD ONC ARIA REFERENCE POINT ID: NORMAL
RAD ONC ARIA REFERENCE POINT ID: NORMAL
RAD ONC ARIA REFERENCE POINT SESSION DOSAGE GIVEN: 1.8 GY
RAD ONC ARIA REFERENCE POINT SESSION DOSAGE GIVEN: 1.8 GY

## 2024-10-15 ENCOUNTER — HOSPITAL ENCOUNTER (OUTPATIENT)
Facility: HOSPITAL | Age: 74
Discharge: HOME | End: 2024-10-18
Attending: EMERGENCY MEDICINE

## 2024-10-15 LAB
RAD ONC ARIA COURSE FIRST TREATMENT DATE: NORMAL
RAD ONC ARIA COURSE ID: NORMAL
RAD ONC ARIA COURSE INTENT: NORMAL
RAD ONC ARIA COURSE LAST TREATMENT DATE: NORMAL
RAD ONC ARIA COURSE SESSION NUMBER: 5
RAD ONC ARIA COURSE START DATE: NORMAL
RAD ONC ARIA COURSE TREATMENT ELAPSED DAYS: 6
RAD ONC ARIA PLAN FRACTIONS TREATED TO DATE: 5
RAD ONC ARIA PLAN ID: NORMAL
RAD ONC ARIA PLAN PRESCRIBED DOSE PER FRACTION: 1.8 GY
RAD ONC ARIA PLAN PRIMARY REFERENCE POINT: NORMAL
RAD ONC ARIA PLAN TOTAL FRACTIONS PRESCRIBED: 28
RAD ONC ARIA PLAN TOTAL PRESCRIBED DOSE: 5040 CGY
RAD ONC ARIA REFERENCE POINT DOSAGE GIVEN TO DATE: 9 GY
RAD ONC ARIA REFERENCE POINT DOSAGE GIVEN TO DATE: 9 GY
RAD ONC ARIA REFERENCE POINT ID: NORMAL
RAD ONC ARIA REFERENCE POINT ID: NORMAL
RAD ONC ARIA REFERENCE POINT SESSION DOSAGE GIVEN: 1.8 GY
RAD ONC ARIA REFERENCE POINT SESSION DOSAGE GIVEN: 1.8 GY

## 2024-10-15 ASSESSMENT — PATIENT HEALTH QUESTIONNAIRE - PHQ9
SUM OF ALL RESPONSES TO PHQ9 QUESTIONS 1 & 2: 0
SUM OF ALL RESPONSES TO PHQ QUESTIONS 1-9: 0
1. LITTLE INTEREST OR PLEASURE IN DOING THINGS: NOT AT ALL
SUM OF ALL RESPONSES TO PHQ QUESTIONS 1-9: 0
2. FEELING DOWN, DEPRESSED OR HOPELESS: NOT AT ALL
SUM OF ALL RESPONSES TO PHQ QUESTIONS 1-9: 0
SUM OF ALL RESPONSES TO PHQ QUESTIONS 1-9: 0

## 2024-10-15 ASSESSMENT — PAIN SCALES - GENERAL: PAINLEVEL_OUTOF10: 0

## 2024-10-15 NOTE — PROGRESS NOTES
[facility] [place] at [facility]  [facility]    Radiation Oncology Weekly Progress Note  Encounter Date: 10/15/24     Erica Holly  [id number]  1950     Care Team:  Dr. Chuck Hazel    Diagnosis   74 y.o. female with a history of cT2N1 IDC of the left breast (UOQ), triple negative, Ki78 80%, Gr3.  S/p left mastectomy and ALND, final path IDC, Gr3, 4cm in max dimension +Gr3 DCIS (cribiform/papillary), triple negative. All margins widely negative (25mm closest). 1/12 LN + (1cm, no NATALIIA). +LVSI. pT2N1a. Has been recommended chemotherapy but has decided against it due to toxicity concerns and also due to dental issues which repairing would be delayed by adjuvant systemic therapy.     AJCC Staging has been reviewed.  Oncologic History     The patient has a past medical history of TIA, HTN, HLD  The patient was in her usual state of health when she self palpated a lesion in the left breast in early 2024  3/28/24 Diagnostic Mammogram: Demonstrated a 2.1cm suspicious mass in the left breast at the 1:00 position, new since 2022 mammogram.  No left axillary lymphadenopathy.  No evidence of malignancy in the right breast.  Left breast ultrasound on the same day demonstrated the same lesion in the left breast, there was also noted to be a left axillary lymph node measuring 0.4 cm that does not appear as suspicious  4/24/2024 bilateral breast MRI demonstrated left breast carcinoma measuring up to 3 cm at the 3 o'clock position, consistent with known malignancy.  In addition, at 6:00 in the posterior third of the left breast there is a heterogenously enhancing mass measuring up to 1.8 cm, BI-RADS 4.  In the posterior third of the right upper inner quadrant, there is also is a third area of concern with non-mass like enhancement measuring up to 1.7 cm - possible left axillary emma metastasis, measuring 5-6mm with focal cortical thickening  4/8/24: left breast, 1:00 (UOQ)

## 2024-10-16 ENCOUNTER — HOSPITAL ENCOUNTER (OUTPATIENT)
Facility: HOSPITAL | Age: 74
Discharge: HOME | End: 2024-10-19
Attending: EMERGENCY MEDICINE

## 2024-10-16 LAB
RAD ONC ARIA COURSE FIRST TREATMENT DATE: NORMAL
RAD ONC ARIA COURSE ID: NORMAL
RAD ONC ARIA COURSE INTENT: NORMAL
RAD ONC ARIA COURSE LAST TREATMENT DATE: NORMAL
RAD ONC ARIA COURSE SESSION NUMBER: 6
RAD ONC ARIA COURSE START DATE: NORMAL
RAD ONC ARIA COURSE TREATMENT ELAPSED DAYS: 7
RAD ONC ARIA PLAN FRACTIONS TREATED TO DATE: 6
RAD ONC ARIA PLAN ID: NORMAL
RAD ONC ARIA PLAN PRESCRIBED DOSE PER FRACTION: 1.8 GY
RAD ONC ARIA PLAN PRIMARY REFERENCE POINT: NORMAL
RAD ONC ARIA PLAN TOTAL FRACTIONS PRESCRIBED: 28
RAD ONC ARIA PLAN TOTAL PRESCRIBED DOSE: 5040 CGY
RAD ONC ARIA REFERENCE POINT DOSAGE GIVEN TO DATE: 10.8 GY
RAD ONC ARIA REFERENCE POINT DOSAGE GIVEN TO DATE: 10.8 GY
RAD ONC ARIA REFERENCE POINT ID: NORMAL
RAD ONC ARIA REFERENCE POINT ID: NORMAL
RAD ONC ARIA REFERENCE POINT SESSION DOSAGE GIVEN: 1.8 GY
RAD ONC ARIA REFERENCE POINT SESSION DOSAGE GIVEN: 1.8 GY

## 2024-10-17 ENCOUNTER — HOSPITAL ENCOUNTER (OUTPATIENT)
Facility: HOSPITAL | Age: 74
Discharge: HOME | End: 2024-10-20
Attending: EMERGENCY MEDICINE

## 2024-10-17 LAB
RAD ONC ARIA COURSE FIRST TREATMENT DATE: NORMAL
RAD ONC ARIA COURSE ID: NORMAL
RAD ONC ARIA COURSE INTENT: NORMAL
RAD ONC ARIA COURSE LAST TREATMENT DATE: NORMAL
RAD ONC ARIA COURSE SESSION NUMBER: 7
RAD ONC ARIA COURSE START DATE: NORMAL
RAD ONC ARIA COURSE TREATMENT ELAPSED DAYS: 8
RAD ONC ARIA PLAN FRACTIONS TREATED TO DATE: 7
RAD ONC ARIA PLAN ID: NORMAL
RAD ONC ARIA PLAN PRESCRIBED DOSE PER FRACTION: 1.8 GY
RAD ONC ARIA PLAN PRIMARY REFERENCE POINT: NORMAL
RAD ONC ARIA PLAN TOTAL FRACTIONS PRESCRIBED: 28
RAD ONC ARIA PLAN TOTAL PRESCRIBED DOSE: 5040 CGY
RAD ONC ARIA REFERENCE POINT DOSAGE GIVEN TO DATE: 12.6 GY
RAD ONC ARIA REFERENCE POINT DOSAGE GIVEN TO DATE: 12.6 GY
RAD ONC ARIA REFERENCE POINT ID: NORMAL
RAD ONC ARIA REFERENCE POINT ID: NORMAL
RAD ONC ARIA REFERENCE POINT SESSION DOSAGE GIVEN: 1.8 GY
RAD ONC ARIA REFERENCE POINT SESSION DOSAGE GIVEN: 1.8 GY

## 2024-10-18 ENCOUNTER — HOSPITAL ENCOUNTER (OUTPATIENT)
Facility: HOSPITAL | Age: 74
Discharge: HOME | End: 2024-10-21
Attending: EMERGENCY MEDICINE

## 2024-10-18 LAB
RAD ONC ARIA COURSE FIRST TREATMENT DATE: NORMAL
RAD ONC ARIA COURSE ID: NORMAL
RAD ONC ARIA COURSE INTENT: NORMAL
RAD ONC ARIA COURSE LAST TREATMENT DATE: NORMAL
RAD ONC ARIA COURSE SESSION NUMBER: 8
RAD ONC ARIA COURSE START DATE: NORMAL
RAD ONC ARIA COURSE TREATMENT ELAPSED DAYS: 9
RAD ONC ARIA PLAN FRACTIONS TREATED TO DATE: 8
RAD ONC ARIA PLAN ID: NORMAL
RAD ONC ARIA PLAN PRESCRIBED DOSE PER FRACTION: 1.8 GY
RAD ONC ARIA PLAN PRIMARY REFERENCE POINT: NORMAL
RAD ONC ARIA PLAN TOTAL FRACTIONS PRESCRIBED: 28
RAD ONC ARIA PLAN TOTAL PRESCRIBED DOSE: 5040 CGY
RAD ONC ARIA REFERENCE POINT DOSAGE GIVEN TO DATE: 14.4 GY
RAD ONC ARIA REFERENCE POINT DOSAGE GIVEN TO DATE: 14.4 GY
RAD ONC ARIA REFERENCE POINT ID: NORMAL
RAD ONC ARIA REFERENCE POINT ID: NORMAL
RAD ONC ARIA REFERENCE POINT SESSION DOSAGE GIVEN: 1.8 GY
RAD ONC ARIA REFERENCE POINT SESSION DOSAGE GIVEN: 1.8 GY

## 2024-10-21 ENCOUNTER — HOSPITAL ENCOUNTER (OUTPATIENT)
Facility: HOSPITAL | Age: 74
Discharge: HOME | End: 2024-10-24
Attending: EMERGENCY MEDICINE

## 2024-10-21 DIAGNOSIS — Z17.1 MALIGNANT NEOPLASM OF CENTRAL PORTION OF LEFT BREAST IN FEMALE, ESTROGEN RECEPTOR NEGATIVE (HCC): Primary | ICD-10-CM

## 2024-10-21 DIAGNOSIS — C50.112 MALIGNANT NEOPLASM OF CENTRAL PORTION OF LEFT BREAST IN FEMALE, ESTROGEN RECEPTOR NEGATIVE (HCC): Primary | ICD-10-CM

## 2024-10-21 LAB
RAD ONC ARIA COURSE FIRST TREATMENT DATE: NORMAL
RAD ONC ARIA COURSE ID: NORMAL
RAD ONC ARIA COURSE INTENT: NORMAL
RAD ONC ARIA COURSE LAST TREATMENT DATE: NORMAL
RAD ONC ARIA COURSE SESSION NUMBER: 9
RAD ONC ARIA COURSE START DATE: NORMAL
RAD ONC ARIA COURSE TREATMENT ELAPSED DAYS: 12
RAD ONC ARIA PLAN FRACTIONS TREATED TO DATE: 9
RAD ONC ARIA PLAN ID: NORMAL
RAD ONC ARIA PLAN PRESCRIBED DOSE PER FRACTION: 1.8 GY
RAD ONC ARIA PLAN PRIMARY REFERENCE POINT: NORMAL
RAD ONC ARIA PLAN TOTAL FRACTIONS PRESCRIBED: 28
RAD ONC ARIA PLAN TOTAL PRESCRIBED DOSE: 5040 CGY
RAD ONC ARIA REFERENCE POINT DOSAGE GIVEN TO DATE: 16.2 GY
RAD ONC ARIA REFERENCE POINT DOSAGE GIVEN TO DATE: 16.2 GY
RAD ONC ARIA REFERENCE POINT ID: NORMAL
RAD ONC ARIA REFERENCE POINT ID: NORMAL
RAD ONC ARIA REFERENCE POINT SESSION DOSAGE GIVEN: 1.8 GY
RAD ONC ARIA REFERENCE POINT SESSION DOSAGE GIVEN: 1.8 GY

## 2024-10-21 ASSESSMENT — PAIN SCALES - GENERAL: PAINLEVEL_OUTOF10: 0

## 2024-10-21 NOTE — PROGRESS NOTES
[place] at [facility]  [facility]    Radiation Oncology Weekly Progress Note  Encounter Date: 10/21/24     Erica Holly  [id number]  1950     Care Team:  Dr. Chuck Hazel    Diagnosis   74 y.o. female with a history of cT2N1 IDC of the left breast (UOQ), triple negative, Ki78 80%, Gr3.  S/p left mastectomy and ALND, final path IDC, Gr3, 4cm in max dimension +Gr3 DCIS (cribiform/papillary), triple negative. All margins widely negative (25mm closest). 1/12 LN + (1cm, no NATALIIA). +LVSI. pT2N1a. Has been recommended chemotherapy but has decided against it due to toxicity concerns and also due to dental issues which repairing would be delayed by adjuvant systemic therapy.     AJCC Staging has been reviewed.  Oncologic History     The patient has a past medical history of TIA, HTN, HLD  The patient was in her usual state of health when she self palpated a lesion in the left breast in early 2024  3/28/24 Diagnostic Mammogram: Demonstrated a 2.1cm suspicious mass in the left breast at the 1:00 position, new since 2022 mammogram.  No left axillary lymphadenopathy.  No evidence of malignancy in the right breast.  Left breast ultrasound on the same day demonstrated the same lesion in the left breast, there was also noted to be a left axillary lymph node measuring 0.4 cm that does not appear as suspicious  4/24/2024 bilateral breast MRI demonstrated left breast carcinoma measuring up to 3 cm at the 3 o'clock position, consistent with known malignancy.  In addition, at 6:00 in the posterior third of the left breast there is a heterogenously enhancing mass measuring up to 1.8 cm, BI-RADS 4.  In the posterior third of the right upper inner quadrant, there is also is a third area of concern with non-mass like enhancement measuring up to 1.7 cm - possible left axillary emma metastasis, measuring 5-6mm with focal cortical thickening  4/8/24: left breast, 1:00 (UOQ)

## 2024-10-22 ENCOUNTER — HOSPITAL ENCOUNTER (OUTPATIENT)
Facility: HOSPITAL | Age: 74
Discharge: HOME | End: 2024-10-25
Attending: EMERGENCY MEDICINE

## 2024-10-22 LAB
RAD ONC ARIA COURSE FIRST TREATMENT DATE: NORMAL
RAD ONC ARIA COURSE ID: NORMAL
RAD ONC ARIA COURSE INTENT: NORMAL
RAD ONC ARIA COURSE LAST TREATMENT DATE: NORMAL
RAD ONC ARIA COURSE SESSION NUMBER: 10
RAD ONC ARIA COURSE START DATE: NORMAL
RAD ONC ARIA COURSE TREATMENT ELAPSED DAYS: 13
RAD ONC ARIA PLAN FRACTIONS TREATED TO DATE: 10
RAD ONC ARIA PLAN ID: NORMAL
RAD ONC ARIA PLAN PRESCRIBED DOSE PER FRACTION: 1.8 GY
RAD ONC ARIA PLAN PRIMARY REFERENCE POINT: NORMAL
RAD ONC ARIA PLAN TOTAL FRACTIONS PRESCRIBED: 28
RAD ONC ARIA PLAN TOTAL PRESCRIBED DOSE: 5040 CGY
RAD ONC ARIA REFERENCE POINT DOSAGE GIVEN TO DATE: 18 GY
RAD ONC ARIA REFERENCE POINT DOSAGE GIVEN TO DATE: 18.01 GY
RAD ONC ARIA REFERENCE POINT ID: NORMAL
RAD ONC ARIA REFERENCE POINT ID: NORMAL
RAD ONC ARIA REFERENCE POINT SESSION DOSAGE GIVEN: 1.8 GY
RAD ONC ARIA REFERENCE POINT SESSION DOSAGE GIVEN: 1.8 GY

## 2024-10-23 ENCOUNTER — HOSPITAL ENCOUNTER (OUTPATIENT)
Facility: HOSPITAL | Age: 74
Discharge: HOME | End: 2024-10-26
Attending: EMERGENCY MEDICINE

## 2024-10-23 LAB
RAD ONC ARIA COURSE FIRST TREATMENT DATE: NORMAL
RAD ONC ARIA COURSE ID: NORMAL
RAD ONC ARIA COURSE INTENT: NORMAL
RAD ONC ARIA COURSE LAST TREATMENT DATE: NORMAL
RAD ONC ARIA COURSE SESSION NUMBER: 11
RAD ONC ARIA COURSE START DATE: NORMAL
RAD ONC ARIA COURSE TREATMENT ELAPSED DAYS: 14
RAD ONC ARIA PLAN FRACTIONS TREATED TO DATE: 11
RAD ONC ARIA PLAN ID: NORMAL
RAD ONC ARIA PLAN PRESCRIBED DOSE PER FRACTION: 1.8 GY
RAD ONC ARIA PLAN PRIMARY REFERENCE POINT: NORMAL
RAD ONC ARIA PLAN TOTAL FRACTIONS PRESCRIBED: 28
RAD ONC ARIA PLAN TOTAL PRESCRIBED DOSE: 5040 CGY
RAD ONC ARIA REFERENCE POINT DOSAGE GIVEN TO DATE: 19.8 GY
RAD ONC ARIA REFERENCE POINT DOSAGE GIVEN TO DATE: 19.81 GY
RAD ONC ARIA REFERENCE POINT ID: NORMAL
RAD ONC ARIA REFERENCE POINT ID: NORMAL
RAD ONC ARIA REFERENCE POINT SESSION DOSAGE GIVEN: 1.8 GY
RAD ONC ARIA REFERENCE POINT SESSION DOSAGE GIVEN: 1.8 GY

## 2024-10-24 ENCOUNTER — HOSPITAL ENCOUNTER (OUTPATIENT)
Facility: HOSPITAL | Age: 74
Discharge: HOME | End: 2024-10-27
Attending: EMERGENCY MEDICINE

## 2024-10-24 LAB
RAD ONC ARIA COURSE FIRST TREATMENT DATE: NORMAL
RAD ONC ARIA COURSE ID: NORMAL
RAD ONC ARIA COURSE INTENT: NORMAL
RAD ONC ARIA COURSE LAST TREATMENT DATE: NORMAL
RAD ONC ARIA COURSE SESSION NUMBER: 12
RAD ONC ARIA COURSE START DATE: NORMAL
RAD ONC ARIA COURSE TREATMENT ELAPSED DAYS: 15
RAD ONC ARIA PLAN FRACTIONS TREATED TO DATE: 12
RAD ONC ARIA PLAN ID: NORMAL
RAD ONC ARIA PLAN PRESCRIBED DOSE PER FRACTION: 1.8 GY
RAD ONC ARIA PLAN PRIMARY REFERENCE POINT: NORMAL
RAD ONC ARIA PLAN TOTAL FRACTIONS PRESCRIBED: 28
RAD ONC ARIA PLAN TOTAL PRESCRIBED DOSE: 5040 CGY
RAD ONC ARIA REFERENCE POINT DOSAGE GIVEN TO DATE: 21.6 GY
RAD ONC ARIA REFERENCE POINT DOSAGE GIVEN TO DATE: 21.61 GY
RAD ONC ARIA REFERENCE POINT ID: NORMAL
RAD ONC ARIA REFERENCE POINT ID: NORMAL
RAD ONC ARIA REFERENCE POINT SESSION DOSAGE GIVEN: 1.8 GY
RAD ONC ARIA REFERENCE POINT SESSION DOSAGE GIVEN: 1.8 GY

## 2024-10-25 ENCOUNTER — HOSPITAL ENCOUNTER (OUTPATIENT)
Facility: HOSPITAL | Age: 74
Discharge: HOME | End: 2024-10-28
Attending: EMERGENCY MEDICINE

## 2024-10-25 LAB
RAD ONC ARIA COURSE FIRST TREATMENT DATE: NORMAL
RAD ONC ARIA COURSE ID: NORMAL
RAD ONC ARIA COURSE INTENT: NORMAL
RAD ONC ARIA COURSE LAST TREATMENT DATE: NORMAL
RAD ONC ARIA COURSE SESSION NUMBER: 13
RAD ONC ARIA COURSE START DATE: NORMAL
RAD ONC ARIA COURSE TREATMENT ELAPSED DAYS: 16
RAD ONC ARIA PLAN FRACTIONS TREATED TO DATE: 13
RAD ONC ARIA PLAN ID: NORMAL
RAD ONC ARIA PLAN PRESCRIBED DOSE PER FRACTION: 1.8 GY
RAD ONC ARIA PLAN PRIMARY REFERENCE POINT: NORMAL
RAD ONC ARIA PLAN TOTAL FRACTIONS PRESCRIBED: 28
RAD ONC ARIA PLAN TOTAL PRESCRIBED DOSE: 5040 CGY
RAD ONC ARIA REFERENCE POINT DOSAGE GIVEN TO DATE: 23.4 GY
RAD ONC ARIA REFERENCE POINT DOSAGE GIVEN TO DATE: 23.41 GY
RAD ONC ARIA REFERENCE POINT ID: NORMAL
RAD ONC ARIA REFERENCE POINT ID: NORMAL
RAD ONC ARIA REFERENCE POINT SESSION DOSAGE GIVEN: 1.8 GY
RAD ONC ARIA REFERENCE POINT SESSION DOSAGE GIVEN: 1.8 GY

## 2024-10-28 ENCOUNTER — HOSPITAL ENCOUNTER (OUTPATIENT)
Facility: HOSPITAL | Age: 74
Discharge: HOME | End: 2024-10-31
Attending: EMERGENCY MEDICINE

## 2024-10-28 LAB
RAD ONC ARIA COURSE FIRST TREATMENT DATE: NORMAL
RAD ONC ARIA COURSE ID: NORMAL
RAD ONC ARIA COURSE INTENT: NORMAL
RAD ONC ARIA COURSE LAST TREATMENT DATE: NORMAL
RAD ONC ARIA COURSE SESSION NUMBER: 14
RAD ONC ARIA COURSE START DATE: NORMAL
RAD ONC ARIA COURSE TREATMENT ELAPSED DAYS: 19
RAD ONC ARIA PLAN FRACTIONS TREATED TO DATE: 14
RAD ONC ARIA PLAN ID: NORMAL
RAD ONC ARIA PLAN PRESCRIBED DOSE PER FRACTION: 1.8 GY
RAD ONC ARIA PLAN PRIMARY REFERENCE POINT: NORMAL
RAD ONC ARIA PLAN TOTAL FRACTIONS PRESCRIBED: 28
RAD ONC ARIA PLAN TOTAL PRESCRIBED DOSE: 5040 CGY
RAD ONC ARIA REFERENCE POINT DOSAGE GIVEN TO DATE: 25.2 GY
RAD ONC ARIA REFERENCE POINT DOSAGE GIVEN TO DATE: 25.21 GY
RAD ONC ARIA REFERENCE POINT ID: NORMAL
RAD ONC ARIA REFERENCE POINT ID: NORMAL
RAD ONC ARIA REFERENCE POINT SESSION DOSAGE GIVEN: 1.8 GY
RAD ONC ARIA REFERENCE POINT SESSION DOSAGE GIVEN: 1.8 GY

## 2024-10-29 ENCOUNTER — HOSPITAL ENCOUNTER (OUTPATIENT)
Facility: HOSPITAL | Age: 74
Discharge: HOME | End: 2024-11-01
Attending: EMERGENCY MEDICINE

## 2024-10-29 LAB
RAD ONC ARIA COURSE FIRST TREATMENT DATE: NORMAL
RAD ONC ARIA COURSE ID: NORMAL
RAD ONC ARIA COURSE INTENT: NORMAL
RAD ONC ARIA COURSE LAST TREATMENT DATE: NORMAL
RAD ONC ARIA COURSE SESSION NUMBER: 15
RAD ONC ARIA COURSE START DATE: NORMAL
RAD ONC ARIA COURSE TREATMENT ELAPSED DAYS: 20
RAD ONC ARIA PLAN FRACTIONS TREATED TO DATE: 15
RAD ONC ARIA PLAN ID: NORMAL
RAD ONC ARIA PLAN PRESCRIBED DOSE PER FRACTION: 1.8 GY
RAD ONC ARIA PLAN PRIMARY REFERENCE POINT: NORMAL
RAD ONC ARIA PLAN TOTAL FRACTIONS PRESCRIBED: 28
RAD ONC ARIA PLAN TOTAL PRESCRIBED DOSE: 5040 CGY
RAD ONC ARIA REFERENCE POINT DOSAGE GIVEN TO DATE: 27 GY
RAD ONC ARIA REFERENCE POINT DOSAGE GIVEN TO DATE: 27.01 GY
RAD ONC ARIA REFERENCE POINT ID: NORMAL
RAD ONC ARIA REFERENCE POINT ID: NORMAL
RAD ONC ARIA REFERENCE POINT SESSION DOSAGE GIVEN: 1.8 GY
RAD ONC ARIA REFERENCE POINT SESSION DOSAGE GIVEN: 1.8 GY

## 2024-10-29 ASSESSMENT — PAIN DESCRIPTION - LOCATION: LOCATION: BREAST;CHEST

## 2024-10-29 ASSESSMENT — PAIN SCALES - GENERAL: PAINLEVEL_OUTOF10: 1

## 2024-10-29 ASSESSMENT — PAIN DESCRIPTION - DESCRIPTORS: DESCRIPTORS: TENDER;ITCHING

## 2024-10-29 ASSESSMENT — PAIN DESCRIPTION - ORIENTATION: ORIENTATION: LEFT

## 2024-10-29 NOTE — PROGRESS NOTES
Cancer [place] at [facility]  [facility]    Radiation Oncology Weekly Progress Note  Encounter Date: 10/29/24     Erica Holly  [id number]  1950     Care Team:  Dr. Chuck Hazel    Diagnosis   74 y.o. female with a history of cT2N1 IDC of the left breast (UOQ), triple negative, Ki78 80%, Gr3.  S/p left mastectomy and ALND, final path IDC, Gr3, 4cm in max dimension +Gr3 DCIS (cribiform/papillary), triple negative. All margins widely negative (25mm closest). 1/12 LN + (1cm, no NATALIIA). +LVSI. pT2N1a. Has been recommended chemotherapy but has decided against it due to toxicity concerns and also due to dental issues which repairing would be delayed by adjuvant systemic therapy.     AJCC Staging has been reviewed.  Oncologic History     The patient has a past medical history of TIA, HTN, HLD  The patient was in her usual state of health when she self palpated a lesion in the left breast in early 2024  3/28/24 Diagnostic Mammogram: Demonstrated a 2.1cm suspicious mass in the left breast at the 1:00 position, new since 2022 mammogram.  No left axillary lymphadenopathy.  No evidence of malignancy in the right breast.  Left breast ultrasound on the same day demonstrated the same lesion in the left breast, there was also noted to be a left axillary lymph node measuring 0.4 cm that does not appear as suspicious  4/24/2024 bilateral breast MRI demonstrated left breast carcinoma measuring up to 3 cm at the 3 o'clock position, consistent with known malignancy.  In addition, at 6:00 in the posterior third of the left breast there is a heterogenously enhancing mass measuring up to 1.8 cm, BI-RADS 4.  In the posterior third of the right upper inner quadrant, there is also is a third area of concern with non-mass like enhancement measuring up to 1.7 cm - possible left axillary emma metastasis, measuring 5-6mm with focal cortical thickening  4/8/24: left breast, 1:00 (UOQ)

## 2024-10-30 ENCOUNTER — HOSPITAL ENCOUNTER (OUTPATIENT)
Facility: HOSPITAL | Age: 74
Discharge: HOME | End: 2024-11-02
Attending: EMERGENCY MEDICINE

## 2024-10-30 LAB
RAD ONC ARIA COURSE FIRST TREATMENT DATE: NORMAL
RAD ONC ARIA COURSE ID: NORMAL
RAD ONC ARIA COURSE INTENT: NORMAL
RAD ONC ARIA COURSE LAST TREATMENT DATE: NORMAL
RAD ONC ARIA COURSE SESSION NUMBER: 16
RAD ONC ARIA COURSE START DATE: NORMAL
RAD ONC ARIA COURSE TREATMENT ELAPSED DAYS: 21
RAD ONC ARIA PLAN FRACTIONS TREATED TO DATE: 16
RAD ONC ARIA PLAN ID: NORMAL
RAD ONC ARIA PLAN PRESCRIBED DOSE PER FRACTION: 1.8 GY
RAD ONC ARIA PLAN PRIMARY REFERENCE POINT: NORMAL
RAD ONC ARIA PLAN TOTAL FRACTIONS PRESCRIBED: 28
RAD ONC ARIA PLAN TOTAL PRESCRIBED DOSE: 5040 CGY
RAD ONC ARIA REFERENCE POINT DOSAGE GIVEN TO DATE: 28.8 GY
RAD ONC ARIA REFERENCE POINT DOSAGE GIVEN TO DATE: 28.81 GY
RAD ONC ARIA REFERENCE POINT ID: NORMAL
RAD ONC ARIA REFERENCE POINT ID: NORMAL
RAD ONC ARIA REFERENCE POINT SESSION DOSAGE GIVEN: 1.8 GY
RAD ONC ARIA REFERENCE POINT SESSION DOSAGE GIVEN: 1.8 GY

## 2024-10-31 ENCOUNTER — HOSPITAL ENCOUNTER (OUTPATIENT)
Facility: HOSPITAL | Age: 74
Discharge: HOME | End: 2024-11-03
Attending: EMERGENCY MEDICINE

## 2024-10-31 LAB
RAD ONC ARIA COURSE FIRST TREATMENT DATE: NORMAL
RAD ONC ARIA COURSE ID: NORMAL
RAD ONC ARIA COURSE INTENT: NORMAL
RAD ONC ARIA COURSE LAST TREATMENT DATE: NORMAL
RAD ONC ARIA COURSE SESSION NUMBER: 17
RAD ONC ARIA COURSE START DATE: NORMAL
RAD ONC ARIA COURSE TREATMENT ELAPSED DAYS: 22
RAD ONC ARIA PLAN FRACTIONS TREATED TO DATE: 17
RAD ONC ARIA PLAN ID: NORMAL
RAD ONC ARIA PLAN PRESCRIBED DOSE PER FRACTION: 1.8 GY
RAD ONC ARIA PLAN PRIMARY REFERENCE POINT: NORMAL
RAD ONC ARIA PLAN TOTAL FRACTIONS PRESCRIBED: 28
RAD ONC ARIA PLAN TOTAL PRESCRIBED DOSE: 5040 CGY
RAD ONC ARIA REFERENCE POINT DOSAGE GIVEN TO DATE: 30.6 GY
RAD ONC ARIA REFERENCE POINT DOSAGE GIVEN TO DATE: 30.61 GY
RAD ONC ARIA REFERENCE POINT ID: NORMAL
RAD ONC ARIA REFERENCE POINT ID: NORMAL
RAD ONC ARIA REFERENCE POINT SESSION DOSAGE GIVEN: 1.8 GY
RAD ONC ARIA REFERENCE POINT SESSION DOSAGE GIVEN: 1.8 GY

## 2024-11-01 ENCOUNTER — HOSPITAL ENCOUNTER (OUTPATIENT)
Facility: HOSPITAL | Age: 74
Discharge: HOME | End: 2024-11-04
Attending: EMERGENCY MEDICINE

## 2024-11-01 LAB
RAD ONC ARIA COURSE FIRST TREATMENT DATE: NORMAL
RAD ONC ARIA COURSE ID: NORMAL
RAD ONC ARIA COURSE INTENT: NORMAL
RAD ONC ARIA COURSE LAST TREATMENT DATE: NORMAL
RAD ONC ARIA COURSE SESSION NUMBER: 18
RAD ONC ARIA COURSE START DATE: NORMAL
RAD ONC ARIA COURSE TREATMENT ELAPSED DAYS: 23
RAD ONC ARIA PLAN FRACTIONS TREATED TO DATE: 18
RAD ONC ARIA PLAN ID: NORMAL
RAD ONC ARIA PLAN PRESCRIBED DOSE PER FRACTION: 1.8 GY
RAD ONC ARIA PLAN PRIMARY REFERENCE POINT: NORMAL
RAD ONC ARIA PLAN TOTAL FRACTIONS PRESCRIBED: 28
RAD ONC ARIA PLAN TOTAL PRESCRIBED DOSE: 5040 CGY
RAD ONC ARIA REFERENCE POINT DOSAGE GIVEN TO DATE: 32.4 GY
RAD ONC ARIA REFERENCE POINT DOSAGE GIVEN TO DATE: 32.41 GY
RAD ONC ARIA REFERENCE POINT ID: NORMAL
RAD ONC ARIA REFERENCE POINT ID: NORMAL
RAD ONC ARIA REFERENCE POINT SESSION DOSAGE GIVEN: 1.8 GY
RAD ONC ARIA REFERENCE POINT SESSION DOSAGE GIVEN: 1.8 GY

## 2024-11-04 ENCOUNTER — HOSPITAL ENCOUNTER (OUTPATIENT)
Facility: HOSPITAL | Age: 74
Discharge: HOME | End: 2024-11-07
Attending: EMERGENCY MEDICINE

## 2024-11-04 LAB
RAD ONC ARIA COURSE FIRST TREATMENT DATE: NORMAL
RAD ONC ARIA COURSE ID: NORMAL
RAD ONC ARIA COURSE INTENT: NORMAL
RAD ONC ARIA COURSE LAST TREATMENT DATE: NORMAL
RAD ONC ARIA COURSE SESSION NUMBER: 19
RAD ONC ARIA COURSE START DATE: NORMAL
RAD ONC ARIA COURSE TREATMENT ELAPSED DAYS: 26
RAD ONC ARIA PLAN FRACTIONS TREATED TO DATE: 19
RAD ONC ARIA PLAN ID: NORMAL
RAD ONC ARIA PLAN PRESCRIBED DOSE PER FRACTION: 1.8 GY
RAD ONC ARIA PLAN PRIMARY REFERENCE POINT: NORMAL
RAD ONC ARIA PLAN TOTAL FRACTIONS PRESCRIBED: 28
RAD ONC ARIA PLAN TOTAL PRESCRIBED DOSE: 5040 CGY
RAD ONC ARIA REFERENCE POINT DOSAGE GIVEN TO DATE: 34.2 GY
RAD ONC ARIA REFERENCE POINT DOSAGE GIVEN TO DATE: 34.21 GY
RAD ONC ARIA REFERENCE POINT ID: NORMAL
RAD ONC ARIA REFERENCE POINT ID: NORMAL
RAD ONC ARIA REFERENCE POINT SESSION DOSAGE GIVEN: 1.8 GY
RAD ONC ARIA REFERENCE POINT SESSION DOSAGE GIVEN: 1.8 GY

## 2024-11-05 ENCOUNTER — HOSPITAL ENCOUNTER (OUTPATIENT)
Facility: HOSPITAL | Age: 74
Discharge: HOME | End: 2024-11-08
Attending: EMERGENCY MEDICINE

## 2024-11-05 LAB
RAD ONC ARIA COURSE FIRST TREATMENT DATE: NORMAL
RAD ONC ARIA COURSE ID: NORMAL
RAD ONC ARIA COURSE INTENT: NORMAL
RAD ONC ARIA COURSE LAST TREATMENT DATE: NORMAL
RAD ONC ARIA COURSE SESSION NUMBER: 20
RAD ONC ARIA COURSE START DATE: NORMAL
RAD ONC ARIA COURSE TREATMENT ELAPSED DAYS: 27
RAD ONC ARIA PLAN FRACTIONS TREATED TO DATE: 20
RAD ONC ARIA PLAN ID: NORMAL
RAD ONC ARIA PLAN PRESCRIBED DOSE PER FRACTION: 1.8 GY
RAD ONC ARIA PLAN PRIMARY REFERENCE POINT: NORMAL
RAD ONC ARIA PLAN TOTAL FRACTIONS PRESCRIBED: 28
RAD ONC ARIA PLAN TOTAL PRESCRIBED DOSE: 5040 CGY
RAD ONC ARIA REFERENCE POINT DOSAGE GIVEN TO DATE: 36 GY
RAD ONC ARIA REFERENCE POINT DOSAGE GIVEN TO DATE: 36.01 GY
RAD ONC ARIA REFERENCE POINT ID: NORMAL
RAD ONC ARIA REFERENCE POINT ID: NORMAL
RAD ONC ARIA REFERENCE POINT SESSION DOSAGE GIVEN: 1.8 GY
RAD ONC ARIA REFERENCE POINT SESSION DOSAGE GIVEN: 1.8 GY

## 2024-11-05 NOTE — PROGRESS NOTES
Cancer [place] at [facility]  [facility]    Radiation Oncology Weekly Progress Note  Encounter Date: 11/05/24     Erica Holly  [id number]  1950     Care Team:  Dr. Chuck Hazel    Diagnosis   74 y.o. female with a history of cT2N1 IDC of the left breast (UOQ), triple negative, Ki78 80%, Gr3.  S/p left mastectomy and ALND, final path IDC, Gr3, 4cm in max dimension +Gr3 DCIS (cribiform/papillary), triple negative. All margins widely negative (25mm closest). 1/12 LN + (1cm, no NATALIIA). +LVSI. pT2N1a. Has been recommended chemotherapy but has decided against it due to toxicity concerns and also due to dental issues which repairing would be delayed by adjuvant systemic therapy.     AJCC Staging has been reviewed.  Oncologic History     The patient has a past medical history of TIA, HTN, HLD  The patient was in her usual state of health when she self palpated a lesion in the left breast in early 2024  3/28/24 Diagnostic Mammogram: Demonstrated a 2.1cm suspicious mass in the left breast at the 1:00 position, new since 2022 mammogram.  No left axillary lymphadenopathy.  No evidence of malignancy in the right breast.  Left breast ultrasound on the same day demonstrated the same lesion in the left breast, there was also noted to be a left axillary lymph node measuring 0.4 cm that does not appear as suspicious  4/24/2024 bilateral breast MRI demonstrated left breast carcinoma measuring up to 3 cm at the 3 o'clock position, consistent with known malignancy.  In addition, at 6:00 in the posterior third of the left breast there is a heterogenously enhancing mass measuring up to 1.8 cm, BI-RADS 4.  In the posterior third of the right upper inner quadrant, there is also is a third area of concern with non-mass like enhancement measuring up to 1.7 cm - possible left axillary emma metastasis, measuring 5-6mm with focal cortical thickening  4/8/24: left breast, 1:00 (UOQ)

## 2024-11-06 ENCOUNTER — HOSPITAL ENCOUNTER (OUTPATIENT)
Facility: HOSPITAL | Age: 74
Discharge: HOME | End: 2024-11-09
Attending: EMERGENCY MEDICINE

## 2024-11-06 LAB
RAD ONC ARIA COURSE FIRST TREATMENT DATE: NORMAL
RAD ONC ARIA COURSE ID: NORMAL
RAD ONC ARIA COURSE INTENT: NORMAL
RAD ONC ARIA COURSE LAST TREATMENT DATE: NORMAL
RAD ONC ARIA COURSE SESSION NUMBER: 21
RAD ONC ARIA COURSE START DATE: NORMAL
RAD ONC ARIA COURSE TREATMENT ELAPSED DAYS: 28
RAD ONC ARIA PLAN FRACTIONS TREATED TO DATE: 21
RAD ONC ARIA PLAN ID: NORMAL
RAD ONC ARIA PLAN PRESCRIBED DOSE PER FRACTION: 1.8 GY
RAD ONC ARIA PLAN PRIMARY REFERENCE POINT: NORMAL
RAD ONC ARIA PLAN TOTAL FRACTIONS PRESCRIBED: 28
RAD ONC ARIA PLAN TOTAL PRESCRIBED DOSE: 5040 CGY
RAD ONC ARIA REFERENCE POINT DOSAGE GIVEN TO DATE: 37.8 GY
RAD ONC ARIA REFERENCE POINT DOSAGE GIVEN TO DATE: 37.81 GY
RAD ONC ARIA REFERENCE POINT ID: NORMAL
RAD ONC ARIA REFERENCE POINT ID: NORMAL
RAD ONC ARIA REFERENCE POINT SESSION DOSAGE GIVEN: 1.8 GY
RAD ONC ARIA REFERENCE POINT SESSION DOSAGE GIVEN: 1.8 GY

## 2024-11-07 ENCOUNTER — HOSPITAL ENCOUNTER (OUTPATIENT)
Facility: HOSPITAL | Age: 74
Discharge: HOME | End: 2024-11-10
Attending: EMERGENCY MEDICINE

## 2024-11-07 LAB
RAD ONC ARIA COURSE FIRST TREATMENT DATE: NORMAL
RAD ONC ARIA COURSE ID: NORMAL
RAD ONC ARIA COURSE INTENT: NORMAL
RAD ONC ARIA COURSE LAST TREATMENT DATE: NORMAL
RAD ONC ARIA COURSE SESSION NUMBER: 22
RAD ONC ARIA COURSE START DATE: NORMAL
RAD ONC ARIA COURSE TREATMENT ELAPSED DAYS: 29
RAD ONC ARIA PLAN FRACTIONS TREATED TO DATE: 22
RAD ONC ARIA PLAN ID: NORMAL
RAD ONC ARIA PLAN PRESCRIBED DOSE PER FRACTION: 1.8 GY
RAD ONC ARIA PLAN PRIMARY REFERENCE POINT: NORMAL
RAD ONC ARIA PLAN TOTAL FRACTIONS PRESCRIBED: 28
RAD ONC ARIA PLAN TOTAL PRESCRIBED DOSE: 5040 CGY
RAD ONC ARIA REFERENCE POINT DOSAGE GIVEN TO DATE: 39.6 GY
RAD ONC ARIA REFERENCE POINT DOSAGE GIVEN TO DATE: 39.61 GY
RAD ONC ARIA REFERENCE POINT ID: NORMAL
RAD ONC ARIA REFERENCE POINT ID: NORMAL
RAD ONC ARIA REFERENCE POINT SESSION DOSAGE GIVEN: 1.8 GY
RAD ONC ARIA REFERENCE POINT SESSION DOSAGE GIVEN: 1.8 GY

## 2024-11-08 ENCOUNTER — HOSPITAL ENCOUNTER (OUTPATIENT)
Facility: HOSPITAL | Age: 74
Discharge: HOME | End: 2024-11-11
Attending: EMERGENCY MEDICINE

## 2024-11-08 LAB
RAD ONC ARIA COURSE FIRST TREATMENT DATE: NORMAL
RAD ONC ARIA COURSE ID: NORMAL
RAD ONC ARIA COURSE INTENT: NORMAL
RAD ONC ARIA COURSE LAST TREATMENT DATE: NORMAL
RAD ONC ARIA COURSE SESSION NUMBER: 23
RAD ONC ARIA COURSE START DATE: NORMAL
RAD ONC ARIA COURSE TREATMENT ELAPSED DAYS: 30
RAD ONC ARIA PLAN FRACTIONS TREATED TO DATE: 23
RAD ONC ARIA PLAN ID: NORMAL
RAD ONC ARIA PLAN PRESCRIBED DOSE PER FRACTION: 1.8 GY
RAD ONC ARIA PLAN PRIMARY REFERENCE POINT: NORMAL
RAD ONC ARIA PLAN TOTAL FRACTIONS PRESCRIBED: 28
RAD ONC ARIA PLAN TOTAL PRESCRIBED DOSE: 5040 CGY
RAD ONC ARIA REFERENCE POINT DOSAGE GIVEN TO DATE: 41.4 GY
RAD ONC ARIA REFERENCE POINT DOSAGE GIVEN TO DATE: 41.41 GY
RAD ONC ARIA REFERENCE POINT ID: NORMAL
RAD ONC ARIA REFERENCE POINT ID: NORMAL
RAD ONC ARIA REFERENCE POINT SESSION DOSAGE GIVEN: 1.8 GY
RAD ONC ARIA REFERENCE POINT SESSION DOSAGE GIVEN: 1.8 GY

## 2024-11-11 ENCOUNTER — HOSPITAL ENCOUNTER (OUTPATIENT)
Facility: HOSPITAL | Age: 74
Discharge: HOME | End: 2024-11-14
Attending: EMERGENCY MEDICINE

## 2024-11-11 LAB
RAD ONC ARIA COURSE FIRST TREATMENT DATE: NORMAL
RAD ONC ARIA COURSE ID: NORMAL
RAD ONC ARIA COURSE INTENT: NORMAL
RAD ONC ARIA COURSE LAST TREATMENT DATE: NORMAL
RAD ONC ARIA COURSE SESSION NUMBER: 24
RAD ONC ARIA COURSE START DATE: NORMAL
RAD ONC ARIA COURSE TREATMENT ELAPSED DAYS: 33
RAD ONC ARIA PLAN FRACTIONS TREATED TO DATE: 24
RAD ONC ARIA PLAN ID: NORMAL
RAD ONC ARIA PLAN PRESCRIBED DOSE PER FRACTION: 1.8 GY
RAD ONC ARIA PLAN PRIMARY REFERENCE POINT: NORMAL
RAD ONC ARIA PLAN TOTAL FRACTIONS PRESCRIBED: 28
RAD ONC ARIA PLAN TOTAL PRESCRIBED DOSE: 5040 CGY
RAD ONC ARIA REFERENCE POINT DOSAGE GIVEN TO DATE: 43.2 GY
RAD ONC ARIA REFERENCE POINT DOSAGE GIVEN TO DATE: 43.21 GY
RAD ONC ARIA REFERENCE POINT ID: NORMAL
RAD ONC ARIA REFERENCE POINT ID: NORMAL
RAD ONC ARIA REFERENCE POINT SESSION DOSAGE GIVEN: 1.8 GY
RAD ONC ARIA REFERENCE POINT SESSION DOSAGE GIVEN: 1.8 GY

## 2024-11-12 ENCOUNTER — HOSPITAL ENCOUNTER (OUTPATIENT)
Facility: HOSPITAL | Age: 74
Discharge: HOME | End: 2024-11-15
Attending: EMERGENCY MEDICINE

## 2024-11-12 LAB
RAD ONC ARIA COURSE FIRST TREATMENT DATE: NORMAL
RAD ONC ARIA COURSE ID: NORMAL
RAD ONC ARIA COURSE INTENT: NORMAL
RAD ONC ARIA COURSE LAST TREATMENT DATE: NORMAL
RAD ONC ARIA COURSE SESSION NUMBER: 25
RAD ONC ARIA COURSE START DATE: NORMAL
RAD ONC ARIA COURSE TREATMENT ELAPSED DAYS: 34
RAD ONC ARIA PLAN FRACTIONS TREATED TO DATE: 25
RAD ONC ARIA PLAN ID: NORMAL
RAD ONC ARIA PLAN PRESCRIBED DOSE PER FRACTION: 1.8 GY
RAD ONC ARIA PLAN PRIMARY REFERENCE POINT: NORMAL
RAD ONC ARIA PLAN TOTAL FRACTIONS PRESCRIBED: 28
RAD ONC ARIA PLAN TOTAL PRESCRIBED DOSE: 5040 CGY
RAD ONC ARIA REFERENCE POINT DOSAGE GIVEN TO DATE: 45 GY
RAD ONC ARIA REFERENCE POINT DOSAGE GIVEN TO DATE: 45.01 GY
RAD ONC ARIA REFERENCE POINT ID: NORMAL
RAD ONC ARIA REFERENCE POINT ID: NORMAL
RAD ONC ARIA REFERENCE POINT SESSION DOSAGE GIVEN: 1.8 GY
RAD ONC ARIA REFERENCE POINT SESSION DOSAGE GIVEN: 1.8 GY

## 2024-11-12 ASSESSMENT — PAIN SCALES - GENERAL: PAINLEVEL_OUTOF10: 2

## 2024-11-12 ASSESSMENT — PAIN DESCRIPTION - LOCATION: LOCATION: BREAST

## 2024-11-12 NOTE — PROGRESS NOTES
needle biopsy - IDC w/squamous/metaplastic features - triple negative, Ki67 of 80%  5/14/24: left axillary biopsy, metastatic carcinoma, 3mm. No NATALIIA  5/15/24: two breast biopsies, both benign (targeted the other two areas of concern on the breast MRI)  6/13/24: She underwent a left breast modified radical mastectomy with Dr. Hazel, path showed IDC Gr3, 4cm in max dimension. +DCIS (cibiform/papillary), high nuclear grade. Negative margins, 1/12 LN+ (1cm node, no AMPARO). +LVSI. pT2N1a, ER/FL/Her2 negative.  She met with Dr. Woods both pre and post operatively, and despite recommendations of chemotherapy given triple negative disease has decided against systemic therapy at this time    Recommended adjuvant comprehensive radiotherapy (CW + LNs), 28 Fx. No Boost.    Interval History   Ms. Holly is a 74 y.o. female seen today for her weekly on-treatment evaluation    10/15/24: Fx 5. Doing well. Mild fatigue, no skin changes. Applying moisturizer. No concerns. Reviewed potential side effects.   10/21/24: At fraction 9 continues to do well.  Minimal dermatitis over left chest wall.  Using Aquaphor daily.  Denies pruritus or pain.  Energy level is good.  10/29/24: Fx 15. Doing well, increased erythema and hyperpigmentation, left CW and axilla. Cream BID. Mild pruritus, discussed hydrocortisone prn. Energy is good, keeping busy with grandchildren.  11/5/24: Fx 20, still doing very well. No fatigue, grandkids keeping her busy which I think is helping with fatigue. Increased hyperpigmentation and erythema, still doing cream BID. No desquamation. All concerns addressed.  11/12/24: Fx 25, final Tx this Fri. Continued hyperpigmentation and erythema. No desquamation, skin still intact. Talked about cream, still doing BID. Also cool packs and aloe + hydrocortisone. Reviewed FU plan. No concerns.      Treatment Details:     Treatment Site Dose/Fx (cGy) #Fx Current Dose (cGy) Total Planned Dose (cGy) Start Date End Date   Left

## 2024-11-13 ENCOUNTER — HOSPITAL ENCOUNTER (OUTPATIENT)
Facility: HOSPITAL | Age: 74
Discharge: HOME | End: 2024-11-16
Attending: EMERGENCY MEDICINE

## 2024-11-13 LAB
RAD ONC ARIA COURSE FIRST TREATMENT DATE: NORMAL
RAD ONC ARIA COURSE ID: NORMAL
RAD ONC ARIA COURSE INTENT: NORMAL
RAD ONC ARIA COURSE LAST TREATMENT DATE: NORMAL
RAD ONC ARIA COURSE SESSION NUMBER: 26
RAD ONC ARIA COURSE START DATE: NORMAL
RAD ONC ARIA COURSE TREATMENT ELAPSED DAYS: 35
RAD ONC ARIA PLAN FRACTIONS TREATED TO DATE: 26
RAD ONC ARIA PLAN ID: NORMAL
RAD ONC ARIA PLAN PRESCRIBED DOSE PER FRACTION: 1.8 GY
RAD ONC ARIA PLAN PRIMARY REFERENCE POINT: NORMAL
RAD ONC ARIA PLAN TOTAL FRACTIONS PRESCRIBED: 28
RAD ONC ARIA PLAN TOTAL PRESCRIBED DOSE: 5040 CGY
RAD ONC ARIA REFERENCE POINT DOSAGE GIVEN TO DATE: 46.8 GY
RAD ONC ARIA REFERENCE POINT DOSAGE GIVEN TO DATE: 46.81 GY
RAD ONC ARIA REFERENCE POINT ID: NORMAL
RAD ONC ARIA REFERENCE POINT ID: NORMAL
RAD ONC ARIA REFERENCE POINT SESSION DOSAGE GIVEN: 1.8 GY
RAD ONC ARIA REFERENCE POINT SESSION DOSAGE GIVEN: 1.8 GY

## 2024-11-14 ENCOUNTER — HOSPITAL ENCOUNTER (OUTPATIENT)
Facility: HOSPITAL | Age: 74
Discharge: HOME | End: 2024-11-17
Attending: EMERGENCY MEDICINE

## 2024-11-14 LAB
RAD ONC ARIA COURSE FIRST TREATMENT DATE: NORMAL
RAD ONC ARIA COURSE ID: NORMAL
RAD ONC ARIA COURSE INTENT: NORMAL
RAD ONC ARIA COURSE LAST TREATMENT DATE: NORMAL
RAD ONC ARIA COURSE SESSION NUMBER: 27
RAD ONC ARIA COURSE START DATE: NORMAL
RAD ONC ARIA COURSE TREATMENT ELAPSED DAYS: 36
RAD ONC ARIA PLAN FRACTIONS TREATED TO DATE: 27
RAD ONC ARIA PLAN ID: NORMAL
RAD ONC ARIA PLAN PRESCRIBED DOSE PER FRACTION: 1.8 GY
RAD ONC ARIA PLAN PRIMARY REFERENCE POINT: NORMAL
RAD ONC ARIA PLAN TOTAL FRACTIONS PRESCRIBED: 28
RAD ONC ARIA PLAN TOTAL PRESCRIBED DOSE: 5040 CGY
RAD ONC ARIA REFERENCE POINT DOSAGE GIVEN TO DATE: 48.6 GY
RAD ONC ARIA REFERENCE POINT DOSAGE GIVEN TO DATE: 48.61 GY
RAD ONC ARIA REFERENCE POINT ID: NORMAL
RAD ONC ARIA REFERENCE POINT ID: NORMAL
RAD ONC ARIA REFERENCE POINT SESSION DOSAGE GIVEN: 1.8 GY
RAD ONC ARIA REFERENCE POINT SESSION DOSAGE GIVEN: 1.8 GY

## 2024-11-15 ENCOUNTER — HOSPITAL ENCOUNTER (OUTPATIENT)
Facility: HOSPITAL | Age: 74
Discharge: HOME | End: 2024-11-18
Attending: EMERGENCY MEDICINE

## 2024-11-15 ENCOUNTER — CLINICAL DOCUMENTATION (OUTPATIENT)
Facility: HOSPITAL | Age: 74
End: 2024-11-15

## 2024-11-15 PROBLEM — Z85.3 HISTORY OF LEFT BREAST CANCER: Status: ACTIVE | Noted: 2024-11-15

## 2024-11-15 PROBLEM — M19.90 ARTHRITIS: Status: ACTIVE | Noted: 2024-11-15

## 2024-11-15 LAB
RAD ONC ARIA COURSE FIRST TREATMENT DATE: NORMAL
RAD ONC ARIA COURSE ID: NORMAL
RAD ONC ARIA COURSE INTENT: NORMAL
RAD ONC ARIA COURSE LAST TREATMENT DATE: NORMAL
RAD ONC ARIA COURSE SESSION NUMBER: 28
RAD ONC ARIA COURSE START DATE: NORMAL
RAD ONC ARIA COURSE TREATMENT ELAPSED DAYS: 37
RAD ONC ARIA PLAN FRACTIONS TREATED TO DATE: 28
RAD ONC ARIA PLAN ID: NORMAL
RAD ONC ARIA PLAN PRESCRIBED DOSE PER FRACTION: 1.8 GY
RAD ONC ARIA PLAN PRIMARY REFERENCE POINT: NORMAL
RAD ONC ARIA PLAN TOTAL FRACTIONS PRESCRIBED: 28
RAD ONC ARIA PLAN TOTAL PRESCRIBED DOSE: 5040 CGY
RAD ONC ARIA REFERENCE POINT DOSAGE GIVEN TO DATE: 50.4 GY
RAD ONC ARIA REFERENCE POINT DOSAGE GIVEN TO DATE: 50.41 GY
RAD ONC ARIA REFERENCE POINT ID: NORMAL
RAD ONC ARIA REFERENCE POINT ID: NORMAL
RAD ONC ARIA REFERENCE POINT SESSION DOSAGE GIVEN: 1.8 GY
RAD ONC ARIA REFERENCE POINT SESSION DOSAGE GIVEN: 1.8 GY

## 2024-11-15 NOTE — PROGRESS NOTES
Cancer [place] at [facility]  [facility]    Radiation Oncology End of Treatment Summary    Erica Holly  [id number]  1950     Care Team:  Dr. Chuck Hazel    Diagnosis   C50.412. Z17.1.     74 y.o. female with a history of cT2N1 IDC of the left breast (UOQ), triple negative, Ki78 80%, Gr3.  S/p left mastectomy and ALND, final path IDC, Gr3, 4cm in max dimension +Gr3 DCIS (cribiform/papillary), triple negative. All margins widely negative (25mm closest). 1/12 LN + (1cm, no NATALIIA). +LVSI. pT2N1a. Has been recommended chemotherapy but has decided against it due to toxicity concerns and also due to dental issues which repairing would be delayed by adjuvant systemic therapy.     Oncologic History     The patient has a past medical history of TIA, HTN, HLD  The patient was in her usual state of health when she self palpated a lesion in the left breast in early 2024  3/28/24 Diagnostic Mammogram: Demonstrated a 2.1cm suspicious mass in the left breast at the 1:00 position, new since 2022 mammogram.  No left axillary lymphadenopathy.  No evidence of malignancy in the right breast.  Left breast ultrasound on the same day demonstrated the same lesion in the left breast, there was also noted to be a left axillary lymph node measuring 0.4 cm that does not appear as suspicious  4/24/2024 bilateral breast MRI demonstrated left breast carcinoma measuring up to 3 cm at the 3 o'clock position, consistent with known malignancy.  In addition, at 6:00 in the posterior third of the left breast there is a heterogenously enhancing mass measuring up to 1.8 cm, BI-RADS 4.  In the posterior third of the right upper inner quadrant, there is also is a third area of concern with non-mass like enhancement measuring up to 1.7 cm - possible left axillary emma metastasis, measuring 5-6mm with focal cortical thickening  4/8/24: left breast, 1:00 (UOQ) needle biopsy - IDC w/squamous/metaplastic

## 2024-11-15 NOTE — PROGRESS NOTES
Cancer [place] at [facility]  [address], [address] [place] [id number]  W: [phone]  F: [phone]    Reason for Visit:   Erica Holly is a 74 y.o. female who is seen fu breast cancer.    Treatment History:   4/8/24: LEFT breast, 1 o'clock position; needle bx: IDC with squamous/ metaplastic features and necrosis, histologic grade 3. Size of largest viable invasive tumor focus in one tissue core: 3.5 mm. ER-/DC-/HER2-, Ki-67 80%     5/14/24  Left axilla, biopsy:        Metastatic carcinoma.   Metastatic deposit measures 3 mm in greatest linear extent.   No extranodal extension identified     5/15/24  FINAL PATHOLOGIC DIAGNOSIS   1.  Left breast, site A, biopsy:        Benign breast tissue with fibroadenomatoid change and fibrocystic   changes including florid usual ductal hyperplasia.   Negative for malignancy or atypia.     2.  Right breast, site B, biopsy:        Benign breast tissue.   Negative for malignancy or atypia.     6/13/24: LEFT breast modified radial mastectomy PATH: IDC, gr 3, measuring 40mm in max dimension. DCIS present, cribriform and papillary patterns, high nuclear grade. Negative margins. Metastatic carcinoma in one of twelve lymph nodes (1/12). pT2, pN1a. ER-/DC-/HER2-       STAGE: pT Category:  pT2        pN Category:  pN1a     clinical T2N1 triple negative  R          DC   Interpretation:     Negative     Interpretation:     Negative   Nuclear Staining %:     0%     Nuclear Staining %:     0%   Intensity:     NA     Intensity     NA   HER-2/abilio  Immunohistochemistry for Breast tumors   Results:     Negative, Score = 0   Ki-67 Immunohistochemical Assay   Result:          80% nuclear staining in invasive carcinoma       History of Present Illness:     Pt seen today for office fu for triple negative breast cancer post mastectomy 6/13/24.    Here again to discuss systemic therapy options.   Unsure about doing any chemo. Still wants to think about chemo  Still

## 2024-11-15 NOTE — PROGRESS NOTES
Cancer [place] at [facility]  [address], [address] [place] [address]  W: [phone]  F: [phone]    Reason for Visit:   Erica Holly is a 74 y.o. female seen today in office for follow up of Left Breast Cancer.     Treatment History:   Per Breast Surgery Note:  4/8/24: LEFT Breast, 1 o'clock position; needle Bx: PATH - IDC with squamous/ metaplastic features and necrosis, histologic grade 3. Size of largest viable invasive tumor focus in one tissue core: 3.5 mm. ER-/AZ-/HER2-, Ki-67 80%  5/14/24: LEFT Axilla, Biopsy: PATH - Metastatic carcinoma. Metastatic deposit measures 3 mm in greatest linear extent. No extranodal extension identified  5/15/24:  Left Breast, site A, Biopsy: Benign breast tissue with fibroadenomatoid change and fibrocystic changes including florid usual ductal hyperplasia. Negative for malignancy or atypia  Right Breast, site B, Biopsy: PATH - Benign breast tissue. Negative for malignancy or atypia.   6/13/24: LEFT Breast Modified Radial Mastectomy PATH: IDC, gr 3, measuring 40mm in max dimension. DCIS present,  cribriform and papillary patterns, high nuclear grade. Negative margins. Metastatic carcinoma in one of twelve lymph nodes (1/12). pT2, pN1a. ER-/AZ-/HER2-  XRT 10/9/24 - 11/15/24 with Dr Delgado    STAGE:   Clinical T2N1  Pathologic pT2 pN1a     History of Present Illness:   Erica Holly is a 74 y.o. female seen today in office for 3 month follow up of Triple Negative Left Breast Cancer post Mastectomy 6/13/24. She recently completed XRT and is not on any therapy from here. She reports that she feels well overall today. Her appetite and energy levels are good. She denies fever, chills, mouth sores, cough, SOB, CP, nausea, vomiting, diarrhea, and constipation. She denies pain today, she some limited ROM in left arm post surgery. She also has some lymphedema. She is here alone today.     Past Medical History:   Diagnosis Date    Arthritis

## 2024-11-20 ENCOUNTER — OFFICE VISIT (OUTPATIENT)
Age: 74
End: 2024-11-20
Payer: MEDICARE

## 2024-11-20 VITALS
HEART RATE: 86 BPM | BODY MASS INDEX: 33.47 KG/M2 | SYSTOLIC BLOOD PRESSURE: 125 MMHG | TEMPERATURE: 98.1 F | RESPIRATION RATE: 18 BRPM | WEIGHT: 183 LBS | DIASTOLIC BLOOD PRESSURE: 78 MMHG | OXYGEN SATURATION: 96 %

## 2024-11-20 DIAGNOSIS — Z85.3 HISTORY OF LEFT BREAST CANCER: Primary | ICD-10-CM

## 2024-11-20 DIAGNOSIS — I10 PRIMARY HYPERTENSION: ICD-10-CM

## 2024-11-20 DIAGNOSIS — Z12.31 BREAST CANCER SCREENING BY MAMMOGRAM: ICD-10-CM

## 2024-11-20 DIAGNOSIS — M25.619 LIMITED RANGE OF MOTION (ROM) OF SHOULDER: ICD-10-CM

## 2024-11-20 DIAGNOSIS — I89.0 LYMPHEDEMA OF LEFT ARM: ICD-10-CM

## 2024-11-20 DIAGNOSIS — M19.90 ARTHRITIS: ICD-10-CM

## 2024-11-20 DIAGNOSIS — Z90.12 S/P MASTECTOMY, LEFT: ICD-10-CM

## 2024-11-20 DIAGNOSIS — Z85.3 PERSONAL HISTORY OF BREAST CANCER: ICD-10-CM

## 2024-11-20 PROCEDURE — 3078F DIAST BP <80 MM HG: CPT | Performed by: NURSE PRACTITIONER

## 2024-11-20 PROCEDURE — 1126F AMNT PAIN NOTED NONE PRSNT: CPT | Performed by: NURSE PRACTITIONER

## 2024-11-20 PROCEDURE — 1123F ACP DISCUSS/DSCN MKR DOCD: CPT | Performed by: NURSE PRACTITIONER

## 2024-11-20 PROCEDURE — 1160F RVW MEDS BY RX/DR IN RCRD: CPT | Performed by: NURSE PRACTITIONER

## 2024-11-20 PROCEDURE — 3074F SYST BP LT 130 MM HG: CPT | Performed by: NURSE PRACTITIONER

## 2024-11-20 PROCEDURE — 99213 OFFICE O/P EST LOW 20 MIN: CPT | Performed by: NURSE PRACTITIONER

## 2024-11-20 PROCEDURE — 1159F MED LIST DOCD IN RCRD: CPT | Performed by: NURSE PRACTITIONER

## 2024-11-20 NOTE — PROGRESS NOTES
Erica Holly is a 74 y.o. female    Chief Complaint   Patient presents with    Follow-up     Left Breast Cancer       1. Have you been to the ER, urgent care clinic since your last visit?  Hospitalized since your last visit?No    2. Have you seen or consulted any other health care providers outside of the [facility] since your last visit?  Include any pap smears or colon screening. No

## 2024-11-21 ENCOUNTER — HOSPITAL ENCOUNTER (OUTPATIENT)
Facility: HOSPITAL | Age: 74
Setting detail: RECURRING SERIES
Discharge: HOME | End: 2024-11-24
Payer: MEDICARE

## 2024-11-21 VITALS — BODY MASS INDEX: 33.68 KG/M2 | WEIGHT: 183 LBS | HEIGHT: 62 IN

## 2024-11-21 PROCEDURE — 97162 PT EVAL MOD COMPLEX 30 MIN: CPT

## 2024-11-21 NOTE — THERAPY EVALUATION
[facility]  [facility]   [address], [address]  [place] [id number]  Phone: [phone]    Fax: [phone]                                                                                PT/OT LYMPHEDEMA - EVALUATION/PLAN OF CARE NOTE (updated 3/23)      Date: 2024          Patient Name:  Erica Holly :  1950   Medical   Diagnosis:  History of left breast cancer [Z85.3]  S/P mastectomy, left [Z90.12]  Lymphedema of left arm [I89.0]  Limited range of motion (ROM) of shoulder [M25.619] Treatment Diagnosis:  I97.2     Post-Mastectomy lymphedema syndrome    Referral Source:  Marga Fenton A* Provider #:  [id number]                Insurance: Payor: [facility] MEDICARE / Plan: [facility] [employer]ALTHKEEPERS MEDIBLUE PLUS / Product Type: *No Product type* /      Patient  verified yes     Visit #   Current  / Total 1 1   Time   In / Out 1036 1145   Total Treatment Time 69   Total Timed Codes 62   1:1 Treatment Time 62      [facility] Totals Reminder:  bill using total billable   min of TIMED therapeutic procedures and modalities.   8-22 min = 1 unit; 23-37 min = 2 units; 38-52 min = 3 units;  53-67 min = 4 units; 68-82 min = 5 units         SUBJECTIVE  Pain Level (0-10 scale): 5 out of 10 numeric scale  currently and at worst with tightness and pulling in axilla and burning in forearm, tightness and pain are present with mobility only not at rest  []constant []intermittent []improving []worsening []no change since onset    Any medication changes, allergies to medications, adverse drug reactions, diagnosis change, or new procedure performed?: [] No    [x] Yes (see summary sheet for update)  Medications: Verified on Patient Summary List    Subjective functional status/changes:    I noticed the swelling  a few weeks ago during radiation.  My tightness is in the underarm not in the shoulder.  When I reach up I can feel a pulling down into the forearm.

## 2024-12-03 ENCOUNTER — TELEPHONE (OUTPATIENT)
Age: 74
End: 2024-12-03

## 2024-12-03 NOTE — TELEPHONE ENCOUNTER
Patient states she is completely out of  spironolactone (ALDACTONE) 25 MG tablet and amLODIPine (NORVASC) 10 MG tablet. She states refilling these medications will cost her around $400 with her insurance and she will not have the money to pay for them until the end of this month. She wanted to know if her provider has any samples of these medications that he could provide her until she is able to pay for her refills. I let her know that I could not guarantee this, but would send a message to the provider and someone from [facility] will reach back out to her with a response. She understood.

## 2024-12-04 ENCOUNTER — HOSPITAL ENCOUNTER (OUTPATIENT)
Facility: HOSPITAL | Age: 74
Setting detail: RECURRING SERIES
Discharge: HOME | End: 2024-12-07
Payer: MEDICARE

## 2024-12-04 PROCEDURE — 97110 THERAPEUTIC EXERCISES: CPT

## 2024-12-04 PROCEDURE — 97140 MANUAL THERAPY 1/> REGIONS: CPT

## 2024-12-04 NOTE — PROGRESS NOTES
to perform safe mobility and dressing. In progress     Long Term Goals: To be accomplished in 15  treatments  Patient will demonstrate an improvement in self perceived functional impairment as evidenced by an improved score on the FOTO from 58 to 72.  2. Patient/caregiver will demonstrate improved edema management as evidenced by performing donning/doffing of garments modified independent 3/3 times within session to aid in reducing risk for infection and promote transition to maintenance phase of CDT.  3.  Patient will demonstrate an increase in range of motion of 40 degrees for left shoulder flexion and 50 degrees for left shoulder abduction for ease of performing ADL's, including reaching into an overhead shelf without pain in her forearm.  4. Patient will demonstrate a loss of volume of the 100 ml in the left upper extremity to reduce the risk of infection and progression of swelling over time for ease of performing compression garment donning and doffing and safe mobility.    PLAN  Yes  Continue plan of care  Re-Cert Due: 2/19/2025 or 15 visits  []  Upgrade activities as tolerated  []  Discharge due to:  [x]  Other:Assess volumes and range of motion next visit.       ANALILIA BEGUM, PTA, CLT      12/4/2024       4:45 PM

## 2024-12-05 NOTE — TELEPHONE ENCOUNTER
Reached out to pt Verified pt identifiers x 2  and relayed message verbatim to pt also asked her to go on [facility] .

## 2024-12-13 ENCOUNTER — HOSPITAL ENCOUNTER (OUTPATIENT)
Facility: HOSPITAL | Age: 74
Setting detail: RECURRING SERIES
Discharge: HOME | End: 2024-12-16
Payer: MEDICARE

## 2024-12-13 PROCEDURE — 97140 MANUAL THERAPY 1/> REGIONS: CPT

## 2024-12-13 PROCEDURE — 97110 THERAPEUTIC EXERCISES: CPT

## 2024-12-13 NOTE — PROGRESS NOTES
PHYSICAL THERAPY/OCCUPATIONAL THERAPY - MEDICARE DAILY TREATMENT NOTE (updated 3/23)    Date: 2024        Patient Name:  Erica Holly :  1950   Medical   Diagnosis:  History of left breast cancer [Z85.3]  S/P mastectomy, left [Z90.12]  Lymphedema of left arm [I89.0]  Limited range of motion (ROM) of shoulder [M25.619] Treatment Diagnosis:  I97.2     Post-Mastectomy lymphedema syndrome    Referral Source:  Marga Fenton A* Insurance:   Payor: [facility] MEDICARE / Plan: [facility] HEALTHKEEPERS MEDIBLUE PLUS / Product Type: *No Product type* /                  Patient  verified yes     Visit #   Current  / Total 3 15   Time   In / Out 10:15 AM 11:45 AM   Total Treatment Time 90   Total Timed Codes 90   1:1 Treatment Time 90      [facility] Totals Reminder:  bill using total billable   min of TIMED therapeutic procedures and modalities.   8-22 min = 1 unit; 23-37 min = 2 units; 38-52 min = 3 units;  53-67 min = 4 units; 68-82 min = 5 units     SUBJECTIVE    Pain Level (0-10 scale): 3/10 L UE/Axilla/Forearm    Any medication changes, allergies to medications, adverse drug reactions, diagnosis change, or new procedure performed?: [x] No    [] Yes (see summary sheet for update)  Medications: Verified on Patient Summary List    Subjective functional status/changes  Decorating or Shama she reports caused some increased swelling in her L UE especially her hand. Patient has been wearing the Edema Wear cut for her L UE last visit, but did not wear when she was decorating. Educated on utilizing compression with activity and exercise to prevent worsening swelling.  Patient has not heard from vendor to date, but explained process of the order and it may take another week to here back from vendor on status of the order.    OBJECTIVE  Therapeutic Procedures:  Tx Min Billable or 1:1 Min (if diff from Tx Min) Procedure, Rationale, Specifics   [phone] [id number] Manual Therapy (timed):  decrease pain, increase

## 2024-12-18 ENCOUNTER — HOSPITAL ENCOUNTER (OUTPATIENT)
Facility: HOSPITAL | Age: 74
Setting detail: RECURRING SERIES
Discharge: HOME | End: 2024-12-21
Payer: MEDICARE

## 2024-12-18 PROCEDURE — 97110 THERAPEUTIC EXERCISES: CPT

## 2024-12-18 PROCEDURE — 97140 MANUAL THERAPY 1/> REGIONS: CPT

## 2024-12-19 NOTE — PROGRESS NOTES
[facility]  a part of [facility]   [address], [address]  [place] [address]  Phone: [phone]  Fax: [phone]     PHYSICAL THERAPY/OCCUPATIONAL THERAPY PROGRESS NOTE            Patient Name:  Erica Holly :  1950   Treatment/Medical Diagnosis: History of left breast cancer [Z85.3]  S/P mastectomy, left [Z90.12]  Lymphedema of left arm [I89.0]  Limited range of motion (ROM) of shoulder [M25.619]   Referral Source:  Marga Fenton A*       Date of Initial Visit:  2024 Attended Visits:  4 Missed Visits:  0      SUMMARY OF TREATMENT/ASSESSMENT:  Patient returns today and is reporting no pain in L UE. She has reports of continued tightness in upper chest/axilla/L UE from axillary web cording, but reports symptoms and range of motion is much improved. Able to progress MLD and mobilizations as skin healing. Patient has significant axillary web cording and was able to be issued dycem today to begin self mobilization in the home as tolerated daily. Patient is very motivated to improve and return to her active lifestyle. She is pleased with her progress she has made with reduction in pain and range of motion. Noted volumes elevated in the L UE today despite wearing Edema Wear, but patient reports that she has been very active with the upcoming holidays. Patient has been measured for custom day and night compression garments and the order is in progress. Patient will continue to benefit from use of Edema Wear as tolerated to prevent worsening swelling until products arrive.   Patient will benefit from a vasopneumatic pump demonstration on an upcoming visit when appropriate.   Patient able to report relief in L UE/L upper quadrant/truncal lymphedema post MLD and gentle axillary web cording mobilizations. Will continue to focus on reducing swelling/symptoms, improving range of motion and progress home program as tolerated.     Short term goals 1+3 met. All

## 2024-12-19 NOTE — PROGRESS NOTES
PHYSICAL THERAPY/OCCUPATIONAL THERAPY - MEDICARE DAILY TREATMENT NOTE (updated 3/23)    Date: 2024        Patient Name:  Erica Holly :  1950   Medical   Diagnosis:  History of left breast cancer [Z85.3]  S/P mastectomy, left [Z90.12]  Lymphedema of left arm [I89.0]  Limited range of motion (ROM) of shoulder [M25.619] Treatment Diagnosis:  I97.2     Post-Mastectomy lymphedema syndrome    Referral Source:  Marga Fenton A* Insurance:   Payor: [facility] MEDICARE / Plan: [facility] HEALTHKEEPERS MEDIBLUE PLUS / Product Type: *No Product type* /                  Patient  verified yes     Visit #   Current  / Total 4 15   Time   In / Out 10:20 AM 11:50   Total Treatment Time 90   Total Timed Codes 90   1:1 Treatment Time 90      [facility] Totals Reminder:  bill using t AMotal billable   min of TIMED therapeutic procedures and modalities.   8-22 min = 1 unit; 23-37 min = 2 units; 38-52 min = 3 units;  53-67 min = 4 units; 68-82 min = 5 units     SUBJECTIVE    Pain Level (0-10 scale): 0/10 (Patient reports no pain today in L UE today on arrival)     Any medication changes, allergies to medications, adverse drug reactions, diagnosis change, or new procedure performed?: [x] No    [] Yes (see summary sheet for update)  Medications: Verified on Patient Summary List    Subjective functional status/changes  Patient reports that she burned herself when cooking when boiling water splashed onto her shirt. She has small intact blister on her abdomen where the boiling water went through her clothing. Educated in monitoring and if signs of infection seek medical attention.  Patient reported no pain and that she has been working on her home program and can see improvement with her L UE.     OBJECTIVE  Therapeutic Procedures:  Tx Min Billable or 1:1 Min (if diff from Tx Min) Procedure, Rationale, Specifics   37 [phone] [id number] Manual Therapy (timed):  decrease pain, increase ROM, decrease edema, and increase

## 2024-12-26 ENCOUNTER — HOSPITAL ENCOUNTER (OUTPATIENT)
Facility: HOSPITAL | Age: 74
Setting detail: RECURRING SERIES
Discharge: HOME | End: 2024-12-29
Payer: MEDICARE

## 2024-12-26 PROCEDURE — 97140 MANUAL THERAPY 1/> REGIONS: CPT

## 2024-12-26 PROCEDURE — 97110 THERAPEUTIC EXERCISES: CPT

## 2024-12-26 NOTE — PROGRESS NOTES
PHYSICAL THERAPY/OCCUPATIONAL THERAPY - MEDICARE DAILY TREATMENT NOTE (updated 3/23)    Date: 2024        Patient Name:  Erica Holly :  1950   Medical   Diagnosis:  History of left breast cancer [Z85.3]  S/P mastectomy, left [Z90.12]  Lymphedema of left arm [I89.0]  Limited range of motion (ROM) of shoulder [M25.619] Treatment Diagnosis:  I97.2     Post-Mastectomy lymphedema syndrome    Referral Source:  Marga Fenton A* Insurance:   Payor: [facility] MEDICARE / Plan: [facility] HEALTHKEEPERS MEDIBLUE PLUS / Product Type: *No Product type* /                  Patient  verified yes     Visit #   Current  / Total 5 15   Time   In / Out  1154  1328   Total Treatment Time 94   Total Timed Codes 94   1:1 Treatment Time 94      [facility] Totals Reminder:  bill using t AMotal billable   min of TIMED therapeutic procedures and modalities.   8-22 min = 1 unit; 23-37 min = 2 units; 38-52 min = 3 units;  53-67 min = 4 units; 68-82 min = 5 units     SUBJECTIVE    Pain Level (0-10 scale): 0 out of 10 numeric scale no pain reported just some tightness in the axilla when reaching    Any medication changes, allergies to medications, adverse drug reactions, diagnosis change, or new procedure performed?: [x] No    [] Yes (see summary sheet for update)  Medications: Verified on Patient Summary List    Subjective functional status/changes  I don't have any pain and I am moving better.  I still have some hand swelling.  It is better in the morning.  This pad I am using under the compression has made my forearm and elbow feel better.  I am able to reach overhead into my second shelf now.  The burn is fine-no problems.        OBJECTIVE  Therapeutic Procedures:  Tx Min Billable or 1:1 Min (if diff from Tx Min) Procedure, Rationale, Specifics    80  [id number] Manual Therapy (timed):  decrease pain, increase ROM, decrease edema, and increase postural awareness to improve patient's ability to progress to PLOF and

## 2024-12-30 ENCOUNTER — HOSPITAL ENCOUNTER (OUTPATIENT)
Facility: HOSPITAL | Age: 74
Setting detail: RECURRING SERIES
Discharge: HOME | End: 2025-01-02
Payer: MEDICARE

## 2024-12-30 PROCEDURE — 97140 MANUAL THERAPY 1/> REGIONS: CPT

## 2024-12-30 PROCEDURE — 97110 THERAPEUTIC EXERCISES: CPT

## 2024-12-30 NOTE — PROGRESS NOTES
PHYSICAL THERAPY/OCCUPATIONAL THERAPY - MEDICARE DAILY TREATMENT NOTE (updated 3/23)    Date: 2024        Patient Name:  Ercia Holly :  1950   Medical   Diagnosis:  History of left breast cancer [Z85.3]  S/P mastectomy, left [Z90.12]  Lymphedema of left arm [I89.0]  Limited range of motion (ROM) of shoulder [M25.619] Treatment Diagnosis:  I97.2     Post-Mastectomy lymphedema syndrome    Referral Source:  Marga Fenton A* Insurance:   Payor: [facility] MEDICARE / Plan: [facility] HEALTHKEEPERS MEDIBLUE PLUS / Product Type: *No Product type* /                  Patient  verified yes     Visit #   Current  / Total 6 15   Time   In / Out 10:25 AM 12:00 PM   Total Treatment Time 95   Total Timed Codes 95   1:1 Treatment Time 95      [facility] Totals Reminder:  bill using t AMotal billable   min of TIMED therapeutic procedures and modalities.   8-22 min = 1 unit; 23-37 min = 2 units; 38-52 min = 3 units;  53-67 min = 4 units; 68-82 min = 5 units     SUBJECTIVE    Pain Level (0-10 scale):0/10 (just reports some tightness in axilla)    Any medication changes, allergies to medications, adverse drug reactions, diagnosis change, or new procedure performed?: [x] No    [] Yes (see summary sheet for update)  Medications: Verified on Patient Summary List    Subjective functional status/changes  Patient reports that she has not heard from vendor on status of her compression garment order. Will reach back out to vendor to check on status on behalf of patient. Patient is working on her home program and wearing Edema Wear as recommended for the L UE.      OBJECTIVE  Therapeutic Procedures:  Tx Min Billable or 1:1 Min (if diff from Tx Min) Procedure, Rationale, Specifics   [phone] [id number] Manual Therapy (timed):  decrease pain, increase ROM, decrease edema, and increase postural awareness to improve patient's ability to progress to PLOF and address remaining functional goals.  The manual therapy interventions

## 2025-01-07 ENCOUNTER — HOSPITAL ENCOUNTER (OUTPATIENT)
Facility: HOSPITAL | Age: 75
Discharge: HOME | End: 2025-01-10
Attending: EMERGENCY MEDICINE

## 2025-01-07 VITALS
HEIGHT: 62 IN | SYSTOLIC BLOOD PRESSURE: 125 MMHG | WEIGHT: 182.6 LBS | BODY MASS INDEX: 33.6 KG/M2 | OXYGEN SATURATION: 98 % | HEART RATE: 69 BPM | DIASTOLIC BLOOD PRESSURE: 76 MMHG | RESPIRATION RATE: 16 BRPM

## 2025-01-07 NOTE — PROGRESS NOTES
Thank you for the opportunity to participate in the care of Ms. Holly. Please feel free to contact me with any questions or concerns.    Mary Delgado, DO  [facility]  [facility]  [address] [place] [address]  P: [phone]  [facility] [address], [place] [address]  P: [phone]  [facility]  [address], [address], [place] [address]  P: [phone]    Time and Counseling: I spent a total of 32 minutes in care of this patient during today's encounter on 1/7/25.

## 2025-01-09 ENCOUNTER — HOSPITAL ENCOUNTER (OUTPATIENT)
Facility: HOSPITAL | Age: 75
Setting detail: RECURRING SERIES
End: 2025-01-09
Payer: MEDICARE

## 2025-01-14 ENCOUNTER — HOSPITAL ENCOUNTER (OUTPATIENT)
Facility: HOSPITAL | Age: 75
Setting detail: RECURRING SERIES
Discharge: HOME | End: 2025-01-17
Payer: MEDICARE

## 2025-01-14 ENCOUNTER — OFFICE VISIT (OUTPATIENT)
Age: 75
End: 2025-01-14
Payer: MEDICARE

## 2025-01-14 DIAGNOSIS — C50.112 MALIGNANT NEOPLASM OF CENTRAL PORTION OF LEFT BREAST IN FEMALE, ESTROGEN RECEPTOR NEGATIVE (HCC): Primary | ICD-10-CM

## 2025-01-14 DIAGNOSIS — Z90.12 S/P LEFT MASTECTOMY: ICD-10-CM

## 2025-01-14 DIAGNOSIS — Z12.31 ENCOUNTER FOR SCREENING MAMMOGRAM FOR BREAST CANCER: ICD-10-CM

## 2025-01-14 DIAGNOSIS — Z85.3 HISTORY OF BREAST CANCER IN FEMALE: ICD-10-CM

## 2025-01-14 DIAGNOSIS — Z17.1 MALIGNANT NEOPLASM OF CENTRAL PORTION OF LEFT BREAST IN FEMALE, ESTROGEN RECEPTOR NEGATIVE (HCC): Primary | ICD-10-CM

## 2025-01-14 PROCEDURE — 1123F ACP DISCUSS/DSCN MKR DOCD: CPT | Performed by: NURSE PRACTITIONER

## 2025-01-14 PROCEDURE — 97140 MANUAL THERAPY 1/> REGIONS: CPT

## 2025-01-14 PROCEDURE — 1159F MED LIST DOCD IN RCRD: CPT | Performed by: NURSE PRACTITIONER

## 2025-01-14 PROCEDURE — 99213 OFFICE O/P EST LOW 20 MIN: CPT | Performed by: NURSE PRACTITIONER

## 2025-01-14 PROCEDURE — 1160F RVW MEDS BY RX/DR IN RCRD: CPT | Performed by: NURSE PRACTITIONER

## 2025-01-14 NOTE — PROGRESS NOTES
PHYSICAL THERAPY/OCCUPATIONAL THERAPY - MEDICARE DAILY TREATMENT NOTE (updated 3/23)     Date: 25      Patient Name:  Erica Holly :  1950   Medical   Diagnosis:  History of left breast cancer [Z85.3]  S/P mastectomy, left [Z90.12]  Lymphedema of left arm [I89.0]  Limited range of motion (ROM) of shoulder [M25.619] Treatment Diagnosis:  I97.2     Post-Mastectomy lymphedema syndrome    Referral Source:  Marga Fenton A* Insurance:   Payor: [facility] MEDICARE / Plan: [facility] [employer]EEP[employer] MEDIBLUE PLUS / Product Type: *No Product type* /                       Patient  verified yes     Visit #   Current  / Total 7 15   Time   In / Out  1158  1328   Total Treatment Time  90   Total Timed Codes  90   1:1 Treatment Time  90      [facility] Totals Reminder:  bill using t AMotal billable   min of TIMED therapeutic procedures and modalities.   8-22 min = 1 unit; 23-37 min = 2 units; 38-52 min = 3 units;  53-67 min = 4 units; 68-82 min = 5 units      SUBJECTIVE     Pain Level (0-10 scale): 0 out of 10 numeric scale, tightness in axilla     Any medication changes, allergies to medications, adverse drug reactions, diagnosis change, or new procedure performed?: [x] No    [] Yes (see summary sheet for update)  Medications: Verified on Patient Summary List     Subjective functional status/changes:  I am supposed to have the sleeve and glove in the next 5-10 days.  I talked to the company this week.  My hand swelling is better.           OBJECTIVE  Therapeutic Procedures:  Tx Min Billable or 1:1 Min (if diff from Tx Min) Procedure, Rationale, Specifics   86    86 [id number] Manual Therapy (timed):  decrease pain, increase ROM, decrease edema, and increase postural awareness to improve patient's ability to progress to PLOF and address remaining functional goals.  The manual therapy interventions were performed at a separate and distinct time from the therapeutic activities interventions . (see flow sheet as 
no  3.  Patient will demonstrate an increase in range of motion of 40 degrees for left shoulder flexion and 50 degrees for left shoulder abduction for ease of performing ADL's, including reaching into an overhead shelf without pain in her forearm.  Status at last Eval/Progress Note: In progress  Current Status: Not Met/In Progress  Goal Met?  no  4. Patient will demonstrate a loss of volume of the 100 ml in the left upper extremity to reduce the risk of infection and progression of swelling over time for ease of performing compression garment donning and doffing and safe mobility.  Status at last Eval/Progress Note: In progress  Current Status: Not Met/In Progress  Goal Met?  no     RECOMMENDATIONS FOR SKILLED THERAPY  Continue Plan of Care        SABAS Toney, CLT-SHITAL    01/14/25  3:53 PM      If you have any questions/comments please contact us directly at [phone].   Thank you for allowing us to assist in the care of your patient.

## 2025-01-14 NOTE — PROGRESS NOTES
HISTORY OF PRESENT ILLNESS  Erica Holly is a 74 y.o. female     HPI Established patient presents for follow-up for LEFT breast cancer.  S/P LEFT mastectomy. Denies breast mass, skin changes, nipple discharge and pain.        Breast history -  Referring - Dr. Rickey Salazar  4/8/24: LEFT Breast, 1 o'clock position; needle Bx: PATH - IDC with squamous/ metaplastic features and necrosis, histologic grade 3. Size of largest viable invasive tumor focus in one tissue core: 3.5 mm. ER-/IN-/HER2-, Ki-67 80%  5/14/24: LEFT Axilla, Biopsy: PATH - Metastatic carcinoma. Metastatic deposit measures 3 mm in greatest linear extent. No extranodal extension identified  5/15/24:  Left Breast, site A, Biopsy: Benign breast tissue with fibroadenomatoid change and fibrocystic changes including florid usual ductal hyperplasia. Negative for malignancy or atypia  Right Breast, site B, Biopsy: PATH - Benign breast tissue. Negative for malignancy or atypia.   6/13/24: LEFT Breast Modified Radial Mastectomy PATH: IDC, gr 3, measuring 40mm in max dimension. DCIS present,  cribriform and papillary patterns, high nuclear grade. Negative margins. Metastatic carcinoma in one of twelve lymph nodes (1/12). pT2, pN1a. ER-/IN-/HER2-  XRT 10/9/24 - 11/15/24 with Dr Delgado        Family history -   No family history of breast or ovarian cancer        Past Surgical History:   Procedure Laterality Date    BREAST BIOPSY  1990's    neg    MASTECTOMY, MODIFIED RADICAL Left 6/13/2024    LEFT BREAST MODIFIED RADICAL MASTECTOMY performed by Antony Hazel Jr, MD at [facility] AMBULATORY OR    MOUTH SURGERY  2024    ALL TOP TEETH REMOVED AND WEARS A DENTURE    MRI BIOPSY BREAST W LOC DEVICE LEFT 1ST LESION Left 05/15/2024    MRI BREAST BX USING DEVICE LEFT 5/15/2024 [facility]    MRI BIOPSY BREAST W LOC DEVICE RIGHT 1ST LESION Right 05/15/2024    MRI BREAST BX USING DEVICE RIGHT 5/15/2024 [facility]    TUBAL LIGATION      US BREAST BIOPSY W

## 2025-01-21 ENCOUNTER — HOSPITAL ENCOUNTER (OUTPATIENT)
Facility: HOSPITAL | Age: 75
Setting detail: RECURRING SERIES
Discharge: HOME | End: 2025-01-24
Payer: MEDICARE

## 2025-01-21 PROCEDURE — 97140 MANUAL THERAPY 1/> REGIONS: CPT

## 2025-01-21 PROCEDURE — 97110 THERAPEUTIC EXERCISES: CPT

## 2025-01-21 NOTE — PROGRESS NOTES
PHYSICAL THERAPY/OCCUPATIONAL THERAPY - MEDICARE DAILY TREATMENT NOTE (updated 3/23)     Date: 25      Patient Name:  Erica Holly :  1950   Medical   Diagnosis:  History of left breast cancer [Z85.3]  S/P mastectomy, left [Z90.12]  Lymphedema of left arm [I89.0]  Limited range of motion (ROM) of shoulder [M25.619] Treatment Diagnosis:  I97.2     Post-Mastectomy lymphedema syndrome    Referral Source:  Marga Fenton A* Insurance:   Payor: [facility] MEDICARE / Plan: [facility] [employer]EEPERS MEDIBLUE PLUS / Product Type: *No Product type* /                       Patient  verified yes     Visit #   Current  / Total 8 15   Time   In / Out   1035  1200    Total Treatment Time   85   Total Timed Codes   85   1:1 Treatment Time   85      [facility] Totals Reminder:  bill using t AMotal billable   min of TIMED therapeutic procedures and modalities.   8-22 min = 1 unit; 23-37 min = 2 units; 38-52 min = 3 units;  53-67 min = 4 units; 68-82 min = 5 units      SUBJECTIVE     Pain Level (0-10 scale): 0 out of 10 numeric scale, tightness in axilla     Any medication changes, allergies to medications, adverse drug reactions, diagnosis change, or new procedure performed?: [x] No    [] Yes (see summary sheet for update)  Medications: Verified on Patient Summary List     Subjective functional status/changes:  I am still waiting on the garments.  They are in the cue to be filled and I will bring them in when I get them.  My hand has been doing better and was a lot better over the weekend.              OBJECTIVE  Therapeutic Procedures:  Tx Min Billable or 1:1 Min (if diff from Tx Min) Procedure, Rationale, Specifics    [phone] [id number] Manual Therapy (timed):  decrease pain, increase ROM, decrease edema, and increase postural awareness to improve patient's ability to progress to PLOF and address remaining functional goals.  The manual therapy interventions were performed at a separate and distinct time from the

## 2025-01-28 ENCOUNTER — HOSPITAL ENCOUNTER (OUTPATIENT)
Facility: HOSPITAL | Age: 75
Setting detail: RECURRING SERIES
Discharge: HOME | End: 2025-01-31
Payer: MEDICARE

## 2025-01-28 PROCEDURE — 97140 MANUAL THERAPY 1/> REGIONS: CPT

## 2025-01-28 PROCEDURE — 97763 ORTHC/PROSTC MGMT SBSQ ENC: CPT

## 2025-01-28 NOTE — PROGRESS NOTES
PHYSICAL THERAPY/OCCUPATIONAL THERAPY - MEDICARE DAILY TREATMENT NOTE (updated 3/23)     Date: 25      Patient Name:  Erica Holly :  1950   Medical   Diagnosis:  History of left breast cancer [Z85.3]  S/P mastectomy, left [Z90.12]  Lymphedema of left arm [I89.0]  Limited range of motion (ROM) of shoulder [M25.619] Treatment Diagnosis:  I97.2     Post-Mastectomy lymphedema syndrome    Referral Source:  Marga Fenton A* Insurance:   Payor: [facility] MEDICARE / Plan: [facility] [employer]EEPERS MEDIBLUE PLUS / Product Type: *No Product type* /                       Patient  verified yes     Visit #   Current  / Total 9 15   Time   In / Out  1322  1508   Total Treatment Time  106   Total Timed Codes 96   1:1 Treatment Time 96      [facility] Totals Reminder:  bill using t AMotal billable   min of TIMED therapeutic procedures and modalities.   8-22 min = 1 unit; 23-37 min = 2 units; 38-52 min = 3 units;  53-67 min = 4 units; 68-82 min = 5 units      SUBJECTIVE     Pain Level (0-10 scale): 0 out of 10 numeric scale, tightness in axilla     Any medication changes, allergies to medications, adverse drug reactions, diagnosis change, or new procedure performed?: [x] No    [] Yes (see summary sheet for update)  Medications: Verified on Patient Summary List     Subjective functional status/changes:  I got some deliveries in and some things to try on.  My hand didn't have any swelling in it yesterday but today you can some swelling.             OBJECTIVE  Therapeutic Procedures:  Tx Min Billable or 1:1 Min (if diff from Tx Min) Procedure, Rationale, Specifics   48 48 [id number] Orthotic/Prosthetic Management and/or Training UE, LE and/or trunk, subsequent orthotic(s)/prosthetic(s) encounter (timed): modification, fitting adjustments and additional training to improve positioning of lower extremity during weight bearing and gait, improve pressure distribution of the plantar aspect of the foot, improve upper

## 2025-01-29 ENCOUNTER — APPOINTMENT (OUTPATIENT)
Facility: HOSPITAL | Age: 75
End: 2025-01-29
Payer: MEDICARE

## 2025-02-06 ENCOUNTER — HOSPITAL ENCOUNTER (OUTPATIENT)
Facility: HOSPITAL | Age: 75
Setting detail: RECURRING SERIES
Discharge: HOME | End: 2025-02-09
Payer: MEDICARE

## 2025-02-06 PROCEDURE — 97535 SELF CARE MNGMENT TRAINING: CPT

## 2025-02-06 PROCEDURE — 97110 THERAPEUTIC EXERCISES: CPT

## 2025-02-06 PROCEDURE — 97140 MANUAL THERAPY 1/> REGIONS: CPT

## 2025-02-12 ENCOUNTER — HOSPITAL ENCOUNTER (OUTPATIENT)
Facility: HOSPITAL | Age: 75
Setting detail: RECURRING SERIES
Discharge: HOME | End: 2025-02-15
Payer: MEDICARE

## 2025-02-12 PROCEDURE — 97535 SELF CARE MNGMENT TRAINING: CPT

## 2025-02-12 PROCEDURE — 97140 MANUAL THERAPY 1/> REGIONS: CPT

## 2025-02-13 NOTE — PROGRESS NOTES
PHYSICAL THERAPY/OCCUPATIONAL THERAPY - MEDICARE DAILY TREATMENT NOTE (updated 3/23)     Date: 25      Patient Name:  Erica Holly :  1950   Medical   Diagnosis:  History of left breast cancer [Z85.3]  S/P mastectomy, left [Z90.12]  Lymphedema of left arm [I89.0]  Limited range of motion (ROM) of shoulder [M25.619] Treatment Diagnosis:  I97.2     Post-Mastectomy lymphedema syndrome    Referral Source:  Marga Fenton A* Insurance:   Payor: [facility] MEDICARE / Plan: [facility] [employer]EEPERS MEDIBLUE PLUS / Product Type: *No Product type* /                       Patient  verified yes     Visit #   Current  / Total 10 15   Time   In / Out 10:30 AM 12:05 PM   Total Treatment Time 95   Total Timed Codes 85   1:1 Treatment Time 85      [facility] Totals Reminder:  bill using t AMotal billable   min of TIMED therapeutic procedures and modalities.   8-22 min = 1 unit; 23-37 min = 2 units; 38-52 min = 3 units;  53-67 min = 4 units; 68-82 min = 5 units      SUBJECTIVE     Pain Level (0-10 scale): Patient reporting soreness in her left axilla/lateral trunk.      Any medication changes, allergies to medications, adverse drug reactions, diagnosis change, or new procedure performed?: [x] No    [] Yes (see summary sheet for update)  Medications: Verified on Patient Summary List     Subjective functional status/changes:  Patient reports that she received her new compression glove, but she thinks it may be to tight.   Patient reports that she is having to have her  assist her with her arm sleeve, but would prefer to be more independent with her compression.  Patient reports not wearing her new compression when she is performing housework/cooking/dishes and utilizing Edema Wear. Discussed to focus on wearing her new compression as it will provide best containment of her swelling.     OBJECTIVE  Therapeutic Procedures:  Tx Min Billable or 1:1 Min (if diff from Tx Min) Procedure, Rationale, Specifics 
safe mobility and dressing.   Status at last Eval/Progress Note: Not Met/In Progress  Current Status: Not Met/In Progress/Partially Met  Goal Met?  no     Long Term Goals: To be accomplished in 15  treatments  Patient will demonstrate an improvement in self perceived functional impairment as evidenced by an improved score on the FOTO from 58 to 72.   Status at last Eval/Progress Note: MET  Current Status: MET  Goal Met?  yes  2. Patient/caregiver will demonstrate improved edema management as evidenced by performing donning/doffing of garments modified independent 3/3 times within session to aid in reducing risk for infection and promote transition to maintenance phase of CDT.  Status at last Eval/Progress Note: Not Met/In Progress  Current Status: Not Met/In Progress/Partially Met  Goal Met?  no  3.  Patient will demonstrate an increase in range of motion of 40 degrees for left shoulder flexion and 50 degrees for left shoulder abduction for ease of performing ADL's, including reaching into an overhead shelf without pain in her forearm.   Status at last Eval/Progress Note: Not Met/In Progress  Current Status: Not Met/In Progress/Partially Met  Goal Met?  no  4. Patient will demonstrate a loss of volume of the 100 ml in the left upper extremity to reduce the risk of infection and progression of swelling over time for ease of performing compression garment donning and doffing and safe mobility  Status at last Eval/Progress Note: Not Met/In Progress  Current Status: Not Met/In Progress  Goal Met?  no        RECOMMENDATIONS FOR SKILLED THERAPY  Continue Plan of Care           ANALILIA BEGUM PTA, DENISE       02/06/25 4:20 PM  SABAS Toney, CLLORENA-SHITAL      If you have any questions/comments please contact us directly at [phone].   Thank you for allowing us to assist in the care of your patient.

## 2025-02-19 ENCOUNTER — HOSPITAL ENCOUNTER (OUTPATIENT)
Facility: HOSPITAL | Age: 75
Setting detail: RECURRING SERIES
Discharge: HOME | End: 2025-02-22
Payer: MEDICARE

## 2025-02-19 PROCEDURE — 97140 MANUAL THERAPY 1/> REGIONS: CPT

## 2025-02-19 PROCEDURE — 97110 THERAPEUTIC EXERCISES: CPT

## 2025-02-19 PROCEDURE — 97016 VASOPNEUMATIC DEVICE THERAPY: CPT

## 2025-02-19 PROCEDURE — 97535 SELF CARE MNGMENT TRAINING: CPT

## 2025-02-19 NOTE — PROGRESS NOTES
PHYSICAL THERAPY/OCCUPATIONAL THERAPY - MEDICARE DAILY TREATMENT NOTE (updated 3/23)     Date: 25      Patient Name:  Erica Holly :  1950   Medical   Diagnosis:  History of left breast cancer [Z85.3]  S/P mastectomy, left [Z90.12]  Lymphedema of left arm [I89.0]  Limited range of motion (ROM) of shoulder [M25.619] Treatment Diagnosis:  I97.2     Post-Mastectomy lymphedema syndrome    Referral Source:  Marga Fenton A* Insurance:   Payor: [facility] MEDICARE / Plan: [facility] [employer]KEEPERS MEDIBLUE PLUS / Product Type: *No Product type* /                       Patient  verified yes     Visit #   Current  / Total 12 15   Time   In / Out 9:35 AM 11:55 AM   Total Treatment Time 145   Total Timed Codes 70   1:1 Treatment Time 70      [facility] Totals Reminder:  bill using t AMotal billable   min of TIMED therapeutic procedures and modalities.   8-22 min = 1 unit; 23-37 min = 2 units; 38-52 min = 3 units;  53-67 min = 4 units; 68-82 min = 5 units      SUBJECTIVE     Pain Level (0-10 scale): Patient left axilla/lateral trunk soreness.      Any medication changes, allergies to medications, adverse drug reactions, diagnosis change, or new procedure performed?: [x] No    [] Yes (see summary sheet for update)  Medications: Verified on Patient Summary List     Subjective functional status/changes:  Patient reports that she was able to pull a sweater overhead today and not have to think about how to get it on her left arm. She reports it has been a long time since she could easily don a garment overhead.    OBJECTIVE  Therapeutic Procedures:  Tx Min Billable or 1:1 Min (if diff from Tx Min) Procedure, Rationale, Specifics   15 15 99811 Self Care/Home Management (timed):  improve patient knowledge and understanding of home injury/symptom/pain management, positioning, home safety, activity modification, and lymphedema home program  to improve patient's ability to progress to PLOF and address remaining

## 2025-02-26 ENCOUNTER — HOSPITAL ENCOUNTER (OUTPATIENT)
Facility: HOSPITAL | Age: 75
Setting detail: RECURRING SERIES
End: 2025-02-26
Payer: MEDICARE

## 2025-02-28 ENCOUNTER — TELEPHONE (OUTPATIENT)
Age: 75
End: 2025-02-28

## 2025-02-28 NOTE — TELEPHONE ENCOUNTER
FU call to patient, ID verified X 2, [phone].  Patient states recent onset of head cold.  Concerned over what to take due to surgery, radiation, etc for breast cancer.  RN inquired what she has taken in past.  Patient stated Robitussin.    Updated patient to push fluids, take Robitussin, if she fails to improve or worsens to seek PCP guidance or urgent care.   Update to Provider.

## 2025-02-28 NOTE — TELEPHONE ENCOUNTER
Pt called wondering if there are any RX's she should not take as she has a head cold & would like to take some medicine      # [phone]

## 2025-03-04 NOTE — PROGRESS NOTES
PHYSICAL THERAPY/OCCUPATIONAL THERAPY - MEDICARE DAILY TREATMENT NOTE (updated 3/23)     Date: 25      Patient Name:  Erica Holly :  1950   Medical   Diagnosis:  History of left breast cancer [Z85.3]  S/P mastectomy, left [Z90.12]  Lymphedema of left arm [I89.0]  Limited range of motion (ROM) of shoulder [M25.619] Treatment Diagnosis:  I97.2     Post-Mastectomy lymphedema syndrome    Referral Source:  Marga Fenton A* Insurance:   Payor: [facility] MEDICARE / Plan: [facility] HEALTHKEEPERS MEDIBLUE PLUS / Product Type: *No Product type* /                       Patient  verified yes     Visit #   Current  / Total 11 15   Time   In / Out 10:15 AM 11:50 AM   Total Treatment Time 95   Total Timed Codes 80   1:1 Treatment Time 80      [facility] Totals Reminder:  bill using t AMotal billable   min of TIMED therapeutic procedures and modalities.   8-22 min = 1 unit; 23-37 min = 2 units; 38-52 min = 3 units;  53-67 min = 4 units; 68-82 min = 5 units      SUBJECTIVE     Pain Level (0-10 scale): Patient left axilla/lateral trunk.     Any medication changes, allergies to medications, adverse drug reactions, diagnosis change, or new procedure performed?: [x] No    [] Yes (see summary sheet for update)  Medications: Verified on Patient Summary List     Subjective functional status/changes:  Patient reports that she was able to pull a sweater overhead today and not have to think about how to get it on her left arm. She reports it has been a long time since she could easily don a garment overhead.    OBJECTIVE  Therapeutic Procedures:  Tx Min Billable or 1:1 Min (if diff from Tx Min) Procedure, Rationale, Specifics   10 10 71954 Self Care/Home Management (timed):  improve patient knowledge and understanding of home injury/symptom/pain management, positioning, home safety, activity modification, and lymphedema home program  to improve patient's ability to progress to PLOF and address remaining functional 
Resident
Resident

## 2025-03-05 ENCOUNTER — HOSPITAL ENCOUNTER (OUTPATIENT)
Facility: HOSPITAL | Age: 75
Setting detail: RECURRING SERIES
Discharge: HOME | End: 2025-03-08
Payer: MEDICARE

## 2025-03-05 PROCEDURE — 97140 MANUAL THERAPY 1/> REGIONS: CPT

## 2025-03-05 PROCEDURE — 97110 THERAPEUTIC EXERCISES: CPT

## 2025-03-05 NOTE — PROGRESS NOTES
PHYSICAL THERAPY/OCCUPATIONAL THERAPY - MEDICARE DAILY TREATMENT NOTE (updated 3/23)     Date: 25      Patient Name:  Erica Holly :  1950   Medical   Diagnosis:  History of left breast cancer [Z85.3]  S/P mastectomy, left [Z90.12]  Lymphedema of left arm [I89.0]  Limited range of motion (ROM) of shoulder [M25.619] Treatment Diagnosis:  I97.2     Post-Mastectomy lymphedema syndrome    Referral Source:  Marga Fenton A* Insurance:   Payor: [facility] MEDICARE / Plan: [facility] HEALTHKEEPERS MEDIBLUE PLUS / Product Type: *No Product type* /                       Patient  verified yes     Visit #   Current  / Total 13 15   Time  In / Out 10:35 AM 12:05 PM   Total Treatment Time 90   Total Timed Codes 90   1:1 Treatment Time 90      [facility] Totals Reminder:  bill using t AMotal billable   min of TIMED therapeutic procedures and modalities.   8-22 min = 1 unit; 23-37 min = 2 units; 38-52 min = 3 units;  53-67 min = 4 units; 68-82 min = 5 units      SUBJECTIVE     Pain Level (0-10 scale): Patient left axilla/lateral trunk soreness.      Any medication changes, allergies to medications, adverse drug reactions, diagnosis change, or new procedure performed?: [x] No    [] Yes (see summary sheet for update)  Medications: Verified on Patient Summary List     Subjective functional status/changes:  Patient returns today for follow up and to have a vasopneumatic pump demonstration in clinic.  Patient has been wearing her compression daily, elevating her L UE, performing exercises/stretching and performing Self MLD in the home setting daily.    OBJECTIVE  Therapeutic Procedures:  Tx Min Billable or 1:1 Min (if diff from Tx Min) Procedure, Rationale, Specifics   15 15 91474 Therapeutic Exercise (timed):  increase ROM, strength, coordination, balance, and proprioception to improve patient's ability to progress to PLOF and address remaining functional goals. (see flow sheet as applicable)    Details if

## 2025-03-12 ENCOUNTER — HOSPITAL ENCOUNTER (OUTPATIENT)
Facility: HOSPITAL | Age: 75
Setting detail: RECURRING SERIES
Discharge: HOME | End: 2025-03-15
Payer: MEDICARE

## 2025-03-12 PROCEDURE — 97110 THERAPEUTIC EXERCISES: CPT

## 2025-03-12 PROCEDURE — 97140 MANUAL THERAPY 1/> REGIONS: CPT

## 2025-03-12 NOTE — PROGRESS NOTES
[facility]  a part of [facility]   [address], [address]  [place] [address]  Phone: [phone]  Fax: [phone]    PHYSICAL THERAPY/OCCUPATIONAL THERAPY PROGRESS NOTE  Patient Name:  Erica Holly :  1950   Treatment/Medical Diagnosis: History of left breast cancer [Z85.3]  S/P mastectomy, left [Z90.12]  Lymphedema of left arm [I89.0]  Limited range of motion (ROM) of shoulder [M25.619]   Referral Source:  Marga Fenton A*     Date of Initial Visit:  *** Attended Visits:  *** Missed Visits:  ***     SUMMARY OF TREATMENT/ASSESSMENT:  ***    CURRENT STATUS/GOALS    ***  Status at last Eval/Progress Note: ***  Current Status: ***  Goal Met?  {Yes/No-Ex:346624}    2.  ***  Status at last Eval/Progress Note: ***  Current Status: ***  Goal Met?  {Yes/No-Ex:873914}    3. ***  Status at last Eval/Progress Note: ***  Current Status: ***  Goal Met?  {Yes/No-Ex:183742}    4. ***  Status at last Eval/Progress Note: ***  Current Status: ***  Goal Met?  {Yes/No-Ex:618990}        RECOMMENDATIONS FOR SKILLED THERAPY  ***        ANALILIA BEGUM, PTA       3/12/2025       12:40 PM    If you have any questions/comments please contact us directly at [phone].   Thank you for allowing us to assist in the care of your patient.   
12/30/2024  compared to 3,210.02 ml 12/18/2024 compared to 3,131.51 ml on 11/21/2024.   Right Upper 2,824.63 ml 3/5/2025 today compared to 2,762.75 ml 2/19/2025 compared to 3,076.19 ml 2/12/2025  compared to 2,862.55 ml 2/6/2025 compared to  2,750.00ml 1/14/2025 versus  2,773.41 ml 12/30/2024  compared to 2,834.59 ml 12/18/2024 compared to 2,862.47 ml on 11/21/2024.      Percent difference today is 13.80% today compared to 16.10% 2/06/2025 to 9.40% on evaluation.     Truncal Girths (cm):     3/5/2025 2/19/2025 2/12/2025 2/6/2025 12/18/2024   Axilla  99.8 103.5 100.8 102.2 100.5   Chest  109.8 110.5 110 109.5 108   Xiphoid  101.5 105.5 106.5 102.6 103.8   Waist  106 108 111.8 107.6 107   Hips  110.5 115 110.5 113.5 117.5     ROM:  L Shoulder (degrees)   2/19/2025 L Shoulder (degrees) Evaluation   Flexion:Seated  0-165 degrees  Post mobilizations/exercise 0-170 degrees Tightness/restrictions at end range Flexion: 0-110   Abduction:Seated  0-155 degrees  Post mobilizations/exercise  0-165 degrees   Tightness/restrictions from axillary web cording. Abduction: 0-90 tightness under arm and pain in forearm      Pain Level at end of session (0-10 scale): 0/10      Assessment    Patient returns today with reduction in L UE volumes from last visit, but continues with volumes greater in L UE from evaluation. Patient encouraged to utilize her compression garments daily and continue with her home program to work towards reduction in volumes. Patient is very active and will benefit from having her additional sets of custom compression when they arrive so that she has enough day time garments for laundering and wearing to have adequate compression consistently. Patient will benefit from having an advanced vasopneumatic device in home setting to use daily during the restorative phase to address her L UE and truncal lymphedema.   Patient has had improvement in her range of motion from evaluation, but continues to be limited in end

## 2025-03-26 ENCOUNTER — HOSPITAL ENCOUNTER (OUTPATIENT)
Facility: HOSPITAL | Age: 75
Setting detail: RECURRING SERIES
Discharge: HOME | End: 2025-03-29
Payer: MEDICARE

## 2025-03-26 PROCEDURE — 97110 THERAPEUTIC EXERCISES: CPT

## 2025-03-26 PROCEDURE — 97140 MANUAL THERAPY 1/> REGIONS: CPT

## 2025-03-26 NOTE — PROGRESS NOTES
PHYSICAL THERAPY/OCCUPATIONAL THERAPY - MEDICARE DAILY TREATMENT NOTE (updated 3/23)     Date: 25      Patient Name:  Erica Holly :  1950   Medical   Diagnosis:  History of left breast cancer [Z85.3]  S/P mastectomy, left [Z90.12]  Lymphedema of left arm [I89.0]  Limited range of motion (ROM) of shoulder [M25.619] Treatment Diagnosis:  I97.2     Post-Mastectomy lymphedema syndrome    Referral Source:  Marga Fenton A* Insurance:   Payor: [facility] MEDICARE / Plan: [facility] [employer] MEDIBLUE PLUS / Product Type: *No Product type* /                       Patient  verified yes     Visit #   Current  / Total 15 18   Time  In / Out 10:35 AM 12:00 PM   Total Treatment Time 85   Total Timed Codes 85   1:1 Treatment Time 85      [facility] Totals Reminder:  bill using total billable   min of TIMED therapeutic procedures and modalities.   8-22 min = 1 unit; 23-37 min = 2 units; 38-52 min = 3 units;  53-67 min = 4 units; 68-82 min = 5 units      SUBJECTIVE     Pain Level (0-10 scale): 3/10 R Axilla/ R UE    Any medication changes, allergies to medications, adverse drug reactions, diagnosis change, or new procedure performed?: [x] No    [] Yes (see summary sheet for update)  Medications: Verified on Patient Summary List     Subjective functional status/changes:  Patient's insurance provider has denied the advanced vasopneumatic device that would best address her L UE and her L upper quadrant/truncal swelling. Patient will need to start with basic device in order to be covered by insurance provider. Due to the significance of her L UE lymphedema she would like to proceed with the basic device and is aware that she will need to monitor for proximal swelling  Patient is awaiting her remaining set of compression garments. She did receive her [employer] [place] to assist with donning in the home setting and reports this has helped her with improving independence with managing her compression.   She

## 2025-04-07 SDOH — ECONOMIC STABILITY: FOOD INSECURITY: WITHIN THE PAST 12 MONTHS, YOU WORRIED THAT YOUR FOOD WOULD RUN OUT BEFORE YOU GOT MONEY TO BUY MORE.: NEVER TRUE

## 2025-04-07 SDOH — ECONOMIC STABILITY: INCOME INSECURITY: IN THE LAST 12 MONTHS, WAS THERE A TIME WHEN YOU WERE NOT ABLE TO PAY THE MORTGAGE OR RENT ON TIME?: NO

## 2025-04-07 SDOH — ECONOMIC STABILITY: FOOD INSECURITY: WITHIN THE PAST 12 MONTHS, THE FOOD YOU BOUGHT JUST DIDN'T LAST AND YOU DIDN'T HAVE MONEY TO GET MORE.: NEVER TRUE

## 2025-04-07 SDOH — ECONOMIC STABILITY: TRANSPORTATION INSECURITY
IN THE PAST 12 MONTHS, HAS THE LACK OF TRANSPORTATION KEPT YOU FROM MEDICAL APPOINTMENTS OR FROM GETTING MEDICATIONS?: NO

## 2025-04-08 ENCOUNTER — OFFICE VISIT (OUTPATIENT)
Age: 75
End: 2025-04-08
Payer: MEDICARE

## 2025-04-08 VITALS
TEMPERATURE: 97.3 F | WEIGHT: 183.4 LBS | HEART RATE: 75 BPM | DIASTOLIC BLOOD PRESSURE: 80 MMHG | RESPIRATION RATE: 18 BRPM | SYSTOLIC BLOOD PRESSURE: 130 MMHG | OXYGEN SATURATION: 99 % | BODY MASS INDEX: 33.75 KG/M2 | HEIGHT: 62 IN

## 2025-04-08 DIAGNOSIS — I10 ESSENTIAL (PRIMARY) HYPERTENSION: ICD-10-CM

## 2025-04-08 DIAGNOSIS — E78.5 DYSLIPIDEMIA, GOAL LDL BELOW 100: ICD-10-CM

## 2025-04-08 DIAGNOSIS — N18.31 STAGE 3A CHRONIC KIDNEY DISEASE (HCC): ICD-10-CM

## 2025-04-08 DIAGNOSIS — I89.0 LYMPHEDEMA: ICD-10-CM

## 2025-04-08 DIAGNOSIS — C77.3 SECONDARY AND UNSPECIFIED MALIGNANT NEOPLASM OF AXILLA AND UPPER LIMB LYMPH NODES: Primary | ICD-10-CM

## 2025-04-08 PROCEDURE — 99213 OFFICE O/P EST LOW 20 MIN: CPT | Performed by: INTERNAL MEDICINE

## 2025-04-08 PROCEDURE — 3079F DIAST BP 80-89 MM HG: CPT | Performed by: INTERNAL MEDICINE

## 2025-04-08 PROCEDURE — 1123F ACP DISCUSS/DSCN MKR DOCD: CPT | Performed by: INTERNAL MEDICINE

## 2025-04-08 PROCEDURE — 3075F SYST BP GE 130 - 139MM HG: CPT | Performed by: INTERNAL MEDICINE

## 2025-04-08 PROCEDURE — 1159F MED LIST DOCD IN RCRD: CPT | Performed by: INTERNAL MEDICINE

## 2025-04-08 PROCEDURE — G2211 COMPLEX E/M VISIT ADD ON: HCPCS | Performed by: INTERNAL MEDICINE

## 2025-04-08 RX ORDER — SPIRONOLACTONE 25 MG/1
25 TABLET ORAL 2 TIMES DAILY
Qty: 180 TABLET | Refills: 1 | Status: SHIPPED | OUTPATIENT
Start: 2025-04-08

## 2025-04-08 RX ORDER — AMLODIPINE BESYLATE 10 MG/1
10 TABLET ORAL DAILY
Qty: 90 TABLET | Refills: 1 | Status: SHIPPED | OUTPATIENT
Start: 2025-04-08

## 2025-04-08 RX ORDER — ATORVASTATIN CALCIUM 20 MG/1
20 TABLET, FILM COATED ORAL DAILY
Qty: 90 TABLET | Refills: 1 | Status: SHIPPED | OUTPATIENT
Start: 2025-04-08

## 2025-04-08 RX ORDER — SPIRONOLACTONE 25 MG/1
25 TABLET ORAL 2 TIMES DAILY
Qty: 180 TABLET | Refills: 1 | Status: SHIPPED | OUTPATIENT
Start: 2025-04-08 | End: 2025-04-08 | Stop reason: SDUPTHER

## 2025-04-08 RX ORDER — ATORVASTATIN CALCIUM 20 MG/1
20 TABLET, FILM COATED ORAL DAILY
Qty: 90 TABLET | Refills: 1 | Status: SHIPPED | OUTPATIENT
Start: 2025-04-08 | End: 2025-04-08 | Stop reason: SDUPTHER

## 2025-04-08 RX ORDER — AMLODIPINE BESYLATE 10 MG/1
10 TABLET ORAL DAILY
Qty: 90 TABLET | Refills: 1 | Status: SHIPPED | OUTPATIENT
Start: 2025-04-08 | End: 2025-04-08 | Stop reason: SDUPTHER

## 2025-04-08 ASSESSMENT — PATIENT HEALTH QUESTIONNAIRE - PHQ9
SUM OF ALL RESPONSES TO PHQ QUESTIONS 1-9: 0
1. LITTLE INTEREST OR PLEASURE IN DOING THINGS: NOT AT ALL
2. FEELING DOWN, DEPRESSED OR HOPELESS: NOT AT ALL
SUM OF ALL RESPONSES TO PHQ QUESTIONS 1-9: 0

## 2025-04-09 ENCOUNTER — RESULTS FOLLOW-UP (OUTPATIENT)
Age: 75
End: 2025-04-09

## 2025-04-09 ENCOUNTER — HOSPITAL ENCOUNTER (OUTPATIENT)
Facility: HOSPITAL | Age: 75
Setting detail: RECURRING SERIES
Discharge: HOME | End: 2025-04-12
Payer: MEDICARE

## 2025-04-09 LAB
ALBUMIN SERPL-MCNC: 4.1 G/DL (ref 3.8–4.8)
ALP SERPL-CCNC: 112 IU/L (ref 44–121)
ALT SERPL-CCNC: 22 IU/L (ref 0–32)
AST SERPL-CCNC: 27 IU/L (ref 0–40)
BASOPHILS # BLD AUTO: 0 X10E3/UL (ref 0–0.2)
BASOPHILS NFR BLD AUTO: 0 %
BILIRUB SERPL-MCNC: 0.5 MG/DL (ref 0–1.2)
BUN SERPL-MCNC: 15 MG/DL (ref 8–27)
BUN/CREAT SERPL: 13 (ref 12–28)
CALCIUM SERPL-MCNC: 9.4 MG/DL (ref 8.7–10.3)
CHLORIDE SERPL-SCNC: 100 MMOL/L (ref 96–106)
CHOLEST SERPL-MCNC: 198 MG/DL (ref 100–199)
CO2 SERPL-SCNC: 21 MMOL/L (ref 20–29)
CREAT SERPL-MCNC: 1.13 MG/DL (ref 0.57–1)
EGFRCR SERPLBLD CKD-EPI 2021: 51 ML/MIN/1.73
EOSINOPHIL # BLD AUTO: 0.1 X10E3/UL (ref 0–0.4)
EOSINOPHIL NFR BLD AUTO: 2 %
ERYTHROCYTE [DISTWIDTH] IN BLOOD BY AUTOMATED COUNT: 13 % (ref 11.7–15.4)
GLOBULIN SER CALC-MCNC: 3.1 G/DL (ref 1.5–4.5)
GLUCOSE SERPL-MCNC: 87 MG/DL (ref 70–99)
HCT VFR BLD AUTO: 40.8 % (ref 34–46.6)
HDLC SERPL-MCNC: 43 MG/DL
HGB BLD-MCNC: 13.7 G/DL (ref 11.1–15.9)
IMM GRANULOCYTES # BLD AUTO: 0 X10E3/UL (ref 0–0.1)
IMM GRANULOCYTES NFR BLD AUTO: 0 %
IMP & REVIEW OF LAB RESULTS: NORMAL
LDLC SERPL CALC-MCNC: 118 MG/DL (ref 0–99)
LYMPHOCYTES # BLD AUTO: 1.7 X10E3/UL (ref 0.7–3.1)
LYMPHOCYTES NFR BLD AUTO: 36 %
MCH RBC QN AUTO: 29.9 PG (ref 26.6–33)
MCHC RBC AUTO-ENTMCNC: 33.6 G/DL (ref 31.5–35.7)
MCV RBC AUTO: 89 FL (ref 79–97)
MONOCYTES # BLD AUTO: 0.5 X10E3/UL (ref 0.1–0.9)
MONOCYTES NFR BLD AUTO: 11 %
NEUTROPHILS # BLD AUTO: 2.3 X10E3/UL (ref 1.4–7)
NEUTROPHILS NFR BLD AUTO: 51 %
PLATELET # BLD AUTO: 312 X10E3/UL (ref 150–450)
POTASSIUM SERPL-SCNC: 4.4 MMOL/L (ref 3.5–5.2)
PROT SERPL-MCNC: 7.2 G/DL (ref 6–8.5)
RBC # BLD AUTO: 4.58 X10E6/UL (ref 3.77–5.28)
REPORT: NORMAL
REPORT: NORMAL
SODIUM SERPL-SCNC: 138 MMOL/L (ref 134–144)
TRIGL SERPL-MCNC: 212 MG/DL (ref 0–149)
VLDLC SERPL CALC-MCNC: 37 MG/DL (ref 5–40)
WBC # BLD AUTO: 4.7 X10E3/UL (ref 3.4–10.8)

## 2025-04-09 PROCEDURE — 97140 MANUAL THERAPY 1/> REGIONS: CPT

## 2025-04-09 PROCEDURE — 97110 THERAPEUTIC EXERCISES: CPT

## 2025-04-09 NOTE — PROGRESS NOTES
I have reviewed all needed documentation in preparation for visit. Verified patient by name and date of birth  Chief Complaint   Patient presents with    6 Month Follow-Up       Vitals:    04/08/25 1139   BP: (!) 150/84   BP Site: Right Upper Arm   Patient Position: Sitting   BP Cuff Size: Medium Adult   Pulse: 75   Resp: 18   Temp: 97.3 °F (36.3 °C)   TempSrc: Temporal   SpO2: 99%   Weight: 83.2 kg (183 lb 6.4 oz)   Height: 1.575 m (5' 2.01\")       Health Maintenance Due   Topic Date Due    Shingles vaccine (1 of 2) Never done    Pneumococcal 50+ years Vaccine (1 of 2 - PCV) Never done    Respiratory Syncytial Virus (RSV) Pregnant or age 60 yrs+ (1 - Risk 60-74 years 1-dose series) Never done    Colorectal Cancer Screen  07/31/2023    COVID-19 Vaccine (4 - 2024-25 season) 09/01/2024    Annual Wellness Visit (Medicare Advantage)  Never done    Breast cancer screen  03/28/2025     \"Have you been to the ER, urgent care clinic since your last visit?  Hospitalized since your last visit?\"    NO    “Have you seen or consulted any other health care providers outside of [facility] since your last visit?”    NO    Have you had a mammogram?”   NO    Date of last Mammogram: 3/28/2024         “Have you had a colorectal cancer screening such as a colonoscopy/FIT/Cologuard?    NO    Date of last Colonoscopy: 4/28/2010  Date of last Cologuard: 7/31/2020  No FIT/FOBT on file   No flexible sigmoidoscopy on file         Click Here for Release of Records Request         SAM Frazier  
well as a good pressure program seems to work need to watch her potassium because of her history she should continue taking out aspirin spironolactone was a very effective drug for her so we need to keep her on that      She has lymphedema of the right arm related to the breast cancer she says she is doing okay with the breast cancer blood pressure control is okay I told her she needed to stay on a baby aspirin she has a history of stroke it was at least 10 years ago but she is at risk for more she needs to stay on a good dose of statin and continue to get keep her lipids under good control and to work hard on keeping her her blood pressure normal

## 2025-04-09 NOTE — PROGRESS NOTES
[facility]  a part of [address], [address]  [place] [address]  Phone: [phone]  Fax: [phone]    PHYSICAL THERAPY/OCCUPATIONAL THERAPY PROGRESS NOTE  Patient Name:  Erica Holly :  1950   Treatment/Medical Diagnosis: History of left breast cancer [Z85.3]  S/P mastectomy, left [Z90.12]  Lymphedema of left arm [I89.0]  Limited range of motion (ROM) of shoulder [M25.619]   Referral Source:  Marga Fenton A*     Date of Initial Visit:  *** Attended Visits:  *** Missed Visits:  ***     SUMMARY OF TREATMENT/ASSESSMENT:  ***    CURRENT STATUS/GOALS    ***  Status at last Eval/Progress Note: ***  Current Status: ***  Goal Met?  {Yes/No-Ex:899992}    2.  ***  Status at last Eval/Progress Note: ***  Current Status: ***  Goal Met?  {Yes/No-Ex:379907}    3. ***  Status at last Eval/Progress Note: ***  Current Status: ***  Goal Met?  {Yes/No-Ex:336565}    4. ***  Status at last Eval/Progress Note: ***  Current Status: ***  Goal Met?  {Yes/No-Ex:320389}        RECOMMENDATIONS FOR SKILLED THERAPY  ***        ANALILIA BEGUM, PTA       2025       12:01 PM    If you have any questions/comments please contact us directly at [phone].   Thank you for allowing us to assist in the care of your patient.   
Remaining long term goals in progress/partially met.      Patient will continue to benefit from skilled PT / OT services to address ROM deficits, address swelling, analyze and address soft tissue restrictions, analyze compression product fit and use, assess and modify postural abnormalities, instruct in home lymphedema management program, measure for compression products, and modify and progress therapeutic interventions to address functional deficits and attain remaining goals.     Progress toward goals / Updated goals:  []  See Progress Note/Recertification    Short Term, Goals: To be accomplished in 8  treatments  Patient and/or caregiver will verbalize 3/3 signs and symptoms of infection without external cueing, in order to reduce the risk of infection and skin impairment and to promote optimal self management of condition. Goal Met 12/18/2024  Patient to perform 5/5 lymphedema remedial exercises in session with modified independence utilizing HEP handout, in order to promote optimal independence with management of  condition, as well as promote optimal limb volume reduction required for proper fit of donned clothing. Goal Met 2/19/2025  3.  Patient will demonstrate an increase in left shoulder flexion and abduction active range of motion of 25 degrees for ease of performing ADL's, including reaching overhead and bathing. Goal Met 12/18/2024    Short Term, Goal: To be accomplished in 15 treatments  4. Patient will be measured for and obtain comfortable and optimal fitting compression products to prevent reaccumulation of fluid at discharge which could impair patient's ability to perform safe mobility and dressing. Goal Met 3/12/2025     Long Term Goals: To be accomplished in 15  treatments  Patient will demonstrate an improvement in self perceived functional impairment as evidenced by an improved score on the FOTO from 58 to 72. Met    Long Term Goals: To be accomplished in 18  treatments  2. Patient/caregiver

## 2025-04-11 NOTE — PATIENT INSTRUCTIONS
[place] [address]   Website: [email or web address]      [facility] [employer] [facility]   What they offer: free coaching services in the areas of Employment, Financial, and Benefits Access   Phone Number: [phone]   Email: [email or web address]   Address: [address] [place] [address]

## 2025-04-15 ENCOUNTER — HOSPITAL ENCOUNTER (EMERGENCY)
Facility: HOSPITAL | Age: 75
Discharge: HOME | End: 2025-04-15
Attending: EMERGENCY MEDICINE
Payer: MEDICARE

## 2025-04-15 ENCOUNTER — APPOINTMENT (OUTPATIENT)
Facility: HOSPITAL | Age: 75
End: 2025-04-15
Payer: MEDICARE

## 2025-04-15 VITALS
BODY MASS INDEX: 34.28 KG/M2 | OXYGEN SATURATION: 96 % | HEART RATE: 72 BPM | DIASTOLIC BLOOD PRESSURE: 71 MMHG | RESPIRATION RATE: 16 BRPM | SYSTOLIC BLOOD PRESSURE: 146 MMHG | HEIGHT: 62 IN | WEIGHT: 186.29 LBS | TEMPERATURE: 97.5 F

## 2025-04-15 DIAGNOSIS — S60.459A FOREIGN BODY IN SKIN OF FINGER, INITIAL ENCOUNTER: Primary | ICD-10-CM

## 2025-04-15 PROCEDURE — 73140 X-RAY EXAM OF FINGER(S): CPT

## 2025-04-15 PROCEDURE — 6360000002 HC RX W HCPCS

## 2025-04-15 PROCEDURE — 99283 EMERGENCY DEPT VISIT LOW MDM: CPT

## 2025-04-15 RX ORDER — GINSENG 100 MG
CAPSULE ORAL
Qty: 14.2 G | Refills: 0 | Status: SHIPPED | OUTPATIENT
Start: 2025-04-15

## 2025-04-15 RX ORDER — LIDOCAINE HYDROCHLORIDE 10 MG/ML
5 INJECTION, SOLUTION EPIDURAL; INFILTRATION; INTRACAUDAL; PERINEURAL ONCE
Status: COMPLETED | OUTPATIENT
Start: 2025-04-15 | End: 2025-04-15

## 2025-04-15 RX ADMIN — LIDOCAINE HYDROCHLORIDE 5 ML: 10 INJECTION, SOLUTION EPIDURAL; INFILTRATION; INTRACAUDAL; PERINEURAL at 10:38

## 2025-04-15 ASSESSMENT — PAIN SCALES - GENERAL: PAINLEVEL_OUTOF10: 0

## 2025-04-15 ASSESSMENT — LIFESTYLE VARIABLES: HOW OFTEN DO YOU HAVE A DRINK CONTAINING ALCOHOL: NEVER

## 2025-04-15 ASSESSMENT — PAIN - FUNCTIONAL ASSESSMENT
PAIN_FUNCTIONAL_ASSESSMENT: 0-10
PAIN_FUNCTIONAL_ASSESSMENT: 0-10

## 2025-04-15 NOTE — ED PROVIDER NOTES
[facility] EMERGENCY DEPARTMENT  EMERGENCY DEPARTMENT ENCOUNTER      Pt Name: Erica Holly  MRN: [id number]  Birthdate 1950  Date of evaluation: 4/15/2025  Provider: Katherin Andrew PA-C    CHIEF COMPLAINT       Chief Complaint   Patient presents with    Hand Pain         HISTORY OF PRESENT ILLNESS   (Location/Symptom, Timing/Onset, Context/Setting, Quality, Duration, Modifying Factors, Severity)  Note limiting factors.   Erica Holly is a 74 y.o. female with history of  has a past medical history of Arthritis, Breast cancer (2024), COVID (2020), DVT (deep venous thrombosis) (HCC), Hyperlipidemia, Hypertension (6/15/2010), and TIA (transient ischemic attack) (2010). who presents from home to [facility] ED with cc of glass foreign body in right fifth finger.  Reports she broke a glass last night and cannot see a piece of glass in the proximal phalanx of the right third finger.  She is up-to-date on tetanus.  No other complaints.  Note she is undergoing treatment for cancer currently.      PCP: Rickey Salazar MD    There are no other complaints, changes or physical findings at this time.                Review of External Medical Records:     Nursing Notes were reviewed.    REVIEW OF SYSTEMS    (2-9 systems for level 4, 10 or more for level 5)     Review of Systems   Skin:  Positive for wound.       Except as noted above the remainder of the review of systems was reviewed and negative.       PAST MEDICAL HISTORY     Past Medical History:   Diagnosis Date    Arthritis     Breast cancer 2024    LEFT BREAST    COVID 2020    NO ISSUES    DVT (deep venous thrombosis) (HCC)     Hyperlipidemia     Hypertension 6/15/2010    TIA (transient ischemic attack) 2010    NO RESIDUAL         SURGICAL HISTORY       Past Surgical History:   Procedure Laterality Date    BREAST BIOPSY  1990's    neg    MASTECTOMY, MODIFIED RADICAL Left 6/13/2024    LEFT BREAST MODIFIED RADICAL MASTECTOMY

## 2025-04-15 NOTE — DISCHARGE INSTRUCTIONS
You were seen today for a foreign body in your finger. It was removed in the emergency department. You should return to the emergency department if you have any signs of infection such as increased swelling, redness surrounding the wound, increased warmth of the skin surrounding the wound or fever. You should follow-up with your PCP within 4 days.

## 2025-04-23 ENCOUNTER — OFFICE VISIT (OUTPATIENT)
Age: 75
End: 2025-04-23
Payer: MEDICARE

## 2025-04-23 ENCOUNTER — TELEPHONE (OUTPATIENT)
Age: 75
End: 2025-04-23

## 2025-04-23 VITALS
RESPIRATION RATE: 18 BRPM | OXYGEN SATURATION: 97 % | WEIGHT: 184 LBS | SYSTOLIC BLOOD PRESSURE: 128 MMHG | DIASTOLIC BLOOD PRESSURE: 76 MMHG | HEART RATE: 76 BPM | BODY MASS INDEX: 33.65 KG/M2 | TEMPERATURE: 98.4 F

## 2025-04-23 DIAGNOSIS — Z17.421 TRIPLE NEGATIVE BREAST CANCER (HCC): ICD-10-CM

## 2025-04-23 DIAGNOSIS — Z85.3 HISTORY OF LEFT BREAST CANCER: Primary | ICD-10-CM

## 2025-04-23 DIAGNOSIS — Z90.12 S/P MASTECTOMY, LEFT: ICD-10-CM

## 2025-04-23 DIAGNOSIS — C50.919 TRIPLE NEGATIVE BREAST CANCER (HCC): ICD-10-CM

## 2025-04-23 PROCEDURE — 1126F AMNT PAIN NOTED NONE PRSNT: CPT | Performed by: INTERNAL MEDICINE

## 2025-04-23 PROCEDURE — 99202 OFFICE O/P NEW SF 15 MIN: CPT | Performed by: INTERNAL MEDICINE

## 2025-04-23 PROCEDURE — 1123F ACP DISCUSS/DSCN MKR DOCD: CPT | Performed by: INTERNAL MEDICINE

## 2025-04-23 PROCEDURE — 3078F DIAST BP <80 MM HG: CPT | Performed by: INTERNAL MEDICINE

## 2025-04-23 PROCEDURE — 3074F SYST BP LT 130 MM HG: CPT | Performed by: INTERNAL MEDICINE

## 2025-04-23 PROCEDURE — 1159F MED LIST DOCD IN RCRD: CPT | Performed by: INTERNAL MEDICINE

## 2025-04-23 PROCEDURE — 1160F RVW MEDS BY RX/DR IN RCRD: CPT | Performed by: INTERNAL MEDICINE

## 2025-04-23 NOTE — TELEPHONE ENCOUNTER
FU call to [facility], [phone], patient fully ID verified, spoke with Kayleigh.  Discussed insurance denial as not medically necessary for patient to have pneumatic compression device for her lymphedema.  Per Kayleigh this has been appealed by [employer] and denied.  Would require P2P if interested in pursuing further.  Update to Provider.

## 2025-04-23 NOTE — PROGRESS NOTES
Erica Holly is a 74 y.o. female    Chief Complaint   Patient presents with    Follow-up     Left Breast Cancer       1. Have you been to the ER, urgent care clinic since your last visit?  Hospitalized since your last visit? Yes,  ER 4/15/24.    2. Have you seen or consulted any other health care providers outside of the [facility] since your last visit?  Include any pap smears or colon screening. Yes, Same office as Marie/PCP, [facility] work, Dr. Santamaria is regular Dentist and suggested referral to [facility], Dental complete fitted denture.      
Examined: 14   Lymphatic and / or Vascular Invasion: Present   Treatment Effect in the Breast: No known presurgical therapy   MARGINS   Margin Status for Invasive Carcinoma: All margins negative for invasive carcinoma   Distance from Invasive Carcinoma to Closest Margin: 25 mm from superior margin   Margin Status for DCIS: All margins negative for DCIS   REGIONAL LYMPH NODES   Regional Lymph Node Status: Tumor present in regional lymph node   Number of Lymph Nodes with Macrometastases - 1   Number of Lymph Nodes with Micrometastases - 0   Number of Lymph Nodes with Isolated Tumor Cells - 0   Size of Largest Stephanie Metastatic Deposit - 10 mm   Extranodal Extension - Not identified   Total Number of Lymph Nodes Examined (sentinel and non-sentinel): 12   Number of [place] Nodes Examined: 0   pTNM CLASSIFICATION (AJCC 8th Edition)   Reporting of pT, pN, and (when applicable) pM categories is based on information available to the pathologist at the time the report is issued. As per the AJCC (Chapter 1, 8th Ed.) it is the managing physician's responsibility to establish the final pathologic stage based upon all pertinent information, including but potentially not limited to this pathology report.   pT Category: pT2   pN Category: pN1a     HORMONE RECEPTOR IMMUNOHISTOCHEMISTRY RESULTS:   ER Interpretation: Negative. Nuclear Staining %: 0%. Intensity: NA   NC Interpretation: Negative. Nuclear Staining %: 0%. Intensity: NA     HER-2/abilio Immunohistochemistry for Breast tumors   Results: Negative, Score = 0     Records reviewed and summarized above.  Pathology report(s) reviewed above.  Radiology report(s) reviewed above.    Assessment:/PLAN     1) pT2 pN1a Triple Negative LEFT Breast Cancer post Mastectomy 6/13/24  Ki-67 80%.   Post mastectomy 6/13/24 - path with 40 mm IDC with 1/12LN+.   Discussed triple neg path; no adjuvant hormonal therapy options.   Discussed adjuvant chemo with TC vs ddAC-T.   Patient did not want

## 2025-04-28 ENCOUNTER — CLINICAL DOCUMENTATION (OUTPATIENT)
Age: 75
End: 2025-04-28

## 2025-04-29 ENCOUNTER — TELEPHONE (OUTPATIENT)
Age: 75
End: 2025-04-29

## 2025-04-29 NOTE — TELEPHONE ENCOUNTER
1234    Incoming call from [employer], pt ID verified x2.  Confirmed insurance has received appeal letter from provider. [employer] authorization unable to lovely expedited appeal and will be run with standard time frame instead. Process can take up to 30 calendar days from appeal received on 4/28/25. Update on appeal will mostly likely be sooner than 5/27/25. Pt will be notified from her insurance as well. Update to provider.

## 2025-04-29 NOTE — TELEPHONE ENCOUNTER
Purnima from [employer] called to see who was the appealing provider for the recent denial. Advised pt's provider and NP. Purnima expressed gratitude.

## 2025-05-01 ENCOUNTER — HOSPITAL ENCOUNTER (OUTPATIENT)
Facility: HOSPITAL | Age: 75
Discharge: HOME | End: 2025-05-01
Payer: MEDICARE

## 2025-05-01 VITALS — BODY MASS INDEX: 33.86 KG/M2 | WEIGHT: 184 LBS | HEIGHT: 62 IN

## 2025-05-01 DIAGNOSIS — Z12.31 BREAST CANCER SCREENING BY MAMMOGRAM: ICD-10-CM

## 2025-05-01 PROCEDURE — 77063 BREAST TOMOSYNTHESIS BI: CPT

## 2025-06-03 ENCOUNTER — HOSPITAL ENCOUNTER (OUTPATIENT)
Facility: HOSPITAL | Age: 75
Setting detail: RECURRING SERIES
Discharge: HOME | End: 2025-06-06
Payer: MEDICARE

## 2025-06-03 PROCEDURE — 97110 THERAPEUTIC EXERCISES: CPT

## 2025-06-03 PROCEDURE — 97535 SELF CARE MNGMENT TRAINING: CPT

## 2025-06-03 NOTE — THERAPY RECERTIFICATION
[facility]  a part of [facility]   [address], [address]  [place] [address]  Phone: [phone]  Fax: [phone]     CONTINUED PLAN OF CARE/RECERTIFICATION FOR PHYSICAL THERAPY or OCCUPATIONAL THERAPY AND DISCHARGE SUMMARY              Patient Name:              Erica Holly :  1950   Treatment/Medical Diagnosis:  History of left breast cancer [Z85.3]  S/P mastectomy, left [Z90.12]  Lymphedema of left arm [I89.0]  Limited range of motion (ROM) of shoulder [M25.619]   Onset Date:  2024     Referral Source:  Marga Fenton A* Start of Care (SOC):  2024   Prior Hospitalization:  See Medical History Provider #:  [id number]      Prior Level of Function (PLOF):  Mobility: Independent with ambulation and transfers  Self Care: Modified independent to independent with dressing and bathing but has difficulty reaching across from left to right shoulder when bathing   Comorbidities:  See Evaluation   Medications:  Verified on Patient Summary List   Visits from SOC:  17 Missed Visits:  0       Progress toward Goals:  Short Term, Goals: To be accomplished in 8  treatments  Patient and/or caregiver will verbalize 3/3 signs and symptoms of infection without external cueing, in order to reduce the risk of infection and skin impairment and to promote optimal self management of condition.   Status at last Eval/Progress Note: MET  Current Status: MET  Goal Met?  yes  Patient to perform 5/5 lymphedema remedial exercises in session with modified independence utilizing HEP handout, in order to promote optimal independence with management of  condition, as well as promote optimal limb volume reduction required for proper fit of donned clothing.   Status at last Eval/Progress Note: MET  Current Status: MET  Goal Met?  yes  3.  Patient will demonstrate an increase in left shoulder flexion and abduction active range of motion of 25 degrees for ease of performing

## 2025-06-24 ENCOUNTER — HOSPITAL ENCOUNTER (OUTPATIENT)
Facility: HOSPITAL | Age: 75
Setting detail: RECURRING SERIES
Discharge: HOME | End: 2025-06-27
Payer: MEDICARE

## 2025-06-24 PROCEDURE — 97535 SELF CARE MNGMENT TRAINING: CPT

## 2025-06-24 PROCEDURE — 97140 MANUAL THERAPY 1/> REGIONS: CPT

## 2025-06-24 PROCEDURE — 97110 THERAPEUTIC EXERCISES: CPT

## 2025-06-24 NOTE — PROGRESS NOTES
PHYSICAL THERAPY/OCCUPATIONAL THERAPY - MEDICARE DAILY TREATMENT NOTE (updated 3/23)     Date: 25      Patient Name:  Erica Holly :  1950   Medical   Diagnosis:  History of left breast cancer [Z85.3]  S/P mastectomy, left [Z90.12]  Lymphedema of left arm [I89.0]  Limited range of motion (ROM) of shoulder [M25.619] Treatment Diagnosis:  I97.2     Post-Mastectomy lymphedema syndrome    Referral Source:  Marga Fenton A* Insurance:   Payor: [facility] MEDICARE / Plan: [facility] [employer] MEDIBLUE PLUS / Product Type: *No Product type* /                       Patient  verified yes     Visit #   Current  / Total 18 18   Time  In / Out   1210   1315   Total Treatment Time   65   Total Timed Codes  65   1:1 Treatment Time  65      [facility] Totals Reminder:  bill using total billable   min of TIMED therapeutic procedures and modalities.   8-22 min = 1 unit; 23-37 min = 2 units; 38-52 min = 3 units;  53-67 min = 4 units; 68-82 min = 5 units      SUBJECTIVE     Pain Level (0-10 scale): 0 out 10  numeric scale    Any medication changes, allergies to medications, adverse drug reactions, diagnosis change, or new procedure performed?: [x] No    [] Yes (see summary sheet for update)  Medications: Verified on Patient Summary List     Subjective functional status/changes:    I got the machine and am using it now. It feels good on my arm but doesn't help the trunk and seems like it would make that worse.  Patient reports she had some muscle cramps on both sides on Saturday and she is not sure if is the heat and she was dehydrated or if it is related to the swelling.        OBJECTIVE  Therapeutic Procedures:  Tx Min Billable or 1:1 Min (if diff from Tx Min) Procedure, Rationale, Specifics    11  11 [id number] Therapeutic Exercise (timed):  increase ROM, strength, coordination, balance, and proprioception to improve patient's ability to progress to PLOF and address remaining functional goals. (see flow

## 2025-06-24 NOTE — THERAPY DISCHARGE
restrictions, analyze compression product fit and use, assess and modify postural abnormalities, instruct in home lymphedema management program, measure for compression products, and modify and progress therapeutic interventions to address functional deficits and attain remaining goals.      CURRENT STATUS   Short Term, Goals: To be accomplished in 8  treatments  Patient and/or caregiver will verbalize 3/3 signs and symptoms of infection without external cueing, in order to reduce the risk of infection and skin impairment and to promote optimal self management of condition.   Status at last Eval/Progress Note: MET  Current Status: MET  Goal Met?  yes  Patient to perform 5/5 lymphedema remedial exercises in session with modified independence utilizing HEP handout, in order to promote optimal independence with management of  condition, as well as promote optimal limb volume reduction required for proper fit of donned clothing.   Status at last Eval/Progress Note: MET  Current Status: MET  Goal Met?  yes  3.  Patient will demonstrate an increase in left shoulder flexion and abduction active range of motion of 25 degrees for ease of performing ADL's, including reaching overhead and bathing.   Status at last Eval/Progress Note: MET  Current Status: MET  Goal Met?  yes  4. Patient will be measured for and obtain comfortable and optimal fitting compression products to prevent reaccumulation of fluid at discharge which could impair patient's ability to perform safe mobility and dressing.   Status at last Eval/Progress Note: MET  Current Status:  MET  Goal Met?   yes     Long Term Goals: To be accomplished in 15  treatments  Patient will demonstrate an improvement in self perceived functional impairment as evidenced by an improved score on the FOTO from 58 to 72.  Status at last Eval/Progress Note: MET  Current Status: MET  Goal Met?  yes  2. Patient/caregiver will demonstrate improved edema management as evidenced by

## 2025-07-15 ENCOUNTER — HOSPITAL ENCOUNTER (OUTPATIENT)
Facility: HOSPITAL | Age: 75
Discharge: HOME | End: 2025-07-18
Attending: EMERGENCY MEDICINE

## 2025-07-15 VITALS
WEIGHT: 187.4 LBS | BODY MASS INDEX: 34.48 KG/M2 | HEIGHT: 62 IN | DIASTOLIC BLOOD PRESSURE: 68 MMHG | OXYGEN SATURATION: 96 % | HEART RATE: 70 BPM | RESPIRATION RATE: 16 BRPM | SYSTOLIC BLOOD PRESSURE: 139 MMHG

## 2025-07-15 ASSESSMENT — PAIN SCALES - GENERAL: PAINLEVEL_OUTOF10: 0

## 2025-07-15 ASSESSMENT — PAIN DESCRIPTION - LOCATION: LOCATION: SHOULDER

## 2025-07-15 ASSESSMENT — PAIN DESCRIPTION - DESCRIPTORS: DESCRIPTORS: BURNING

## 2025-07-15 ASSESSMENT — PAIN DESCRIPTION - ORIENTATION: ORIENTATION: LEFT

## 2025-07-15 NOTE — PROGRESS NOTES
[facility] [place] [facility]  [facility]    Radiation Oncology Follow Up Note    Date of Encounter: 07/15/25     Erica Holly  [id number]  1950     Care Team:  Dr. Chuck Hazel    Diagnosis   C50.412. Z17.1.      The patient is a 75 y.o. female with a history of cT2N1 IDC of the LEFT breast (UOQ), triple negative, Ki78 80%, Gr3.  S/p left mastectomy and ALND, final path IDC, Gr3, 4cm in max dimension +Gr3 DCIS (cribiform/papillary). All margins widely negative (25mm closest). 1/12 LN + (1cm, no NATALIIA). +LVSI. pT2N1a. Opted out of chemo despite it being recommended, s/p adjuvant RT 50.4 Gy in 28 Fx to the left chest wall and regional lymph nodes, EOT 11/15/25.    AJCC Staging has been reviewed  History of Present Illness   Ms. Holly is a 75 y.o. female seen in routine post treatment follow up for her above diagnosis.      Oncologic History:  The patient has a past medical history of TIA, HTN, HLD  The patient was in her usual state of health when she self palpated a lesion in the left breast in early 2024  3/28/24 Diagnostic Mammogram: Demonstrated a 2.1cm suspicious mass in the left breast at the 1:00 position, new since 2022 mammogram.  No left axillary lymphadenopathy.  No evidence of malignancy in the right breast.  Left breast ultrasound on the same day demonstrated the same lesion in the left breast, there was also noted to be a left axillary lymph node measuring 0.4 cm that does not appear as suspicious  4/24/2024 bilateral breast MRI demonstrated left breast carcinoma measuring up to 3 cm at the 3 o'clock position, consistent with known malignancy.  In addition, at 6:00 in the posterior third of the left breast there is a heterogenously enhancing mass measuring up to 1.8 cm, BI-RADS 4.  In the posterior third of the right upper inner quadrant, there is also is a third area of concern with non-mass like enhancement measuring up to 1.7 cm - possible left

## 2025-08-18 ENCOUNTER — TELEPHONE (OUTPATIENT)
Dept: PHARMACY | Facility: CLINIC | Age: 75
End: 2025-08-18

## 2025-09-04 ENCOUNTER — COMMUNITY OUTREACH (OUTPATIENT)
Age: 75
End: 2025-09-04

## (undated) DEVICE — X-RAY DETECTABLE SPONGES,16 PLY: Brand: VISTEC

## (undated) DEVICE — PROVE COVER: Brand: UNBRANDED

## (undated) DEVICE — GLOVE ORANGE PI 7 1/2   MSG9075

## (undated) DEVICE — PENCIL SMK EVAC L10FT DIA95MM TBNG NONSTICK W ADPT TO 22MM

## (undated) DEVICE — SUTURE PERMA-HAND SZ 2-0 L30IN NONABSORBABLE BLK L26MM SH K833H

## (undated) DEVICE — 3M™ TEGADERM™ TRANSPARENT FILM DRESSING FRAME STYLE, 1626W, 4 IN X 4-3/4 IN (10 CM X 12 CM), 50/CT 4CT/CASE: Brand: 3M™ TEGADERM™

## (undated) DEVICE — PLASTICS CHEST BREAST ASU: Brand: MEDLINE INDUSTRIES, INC.

## (undated) DEVICE — SOLUTION IRRIG 1000ML 0.9% SOD CHL USP POUR PLAS BTL

## (undated) DEVICE — BLADE ES ELASTOMERIC COAT INSUL DURABLE BEND UPTO 90DEG

## (undated) DEVICE — ADHESIVE SKIN CLOSURE WND 8.661X1.5 IN 22 CM LIQUIBAND SECUR

## (undated) DEVICE — GARMENT,MEDLINE,DVT,INT,CALF,MED, GEN2: Brand: MEDLINE

## (undated) DEVICE — SEALER LAP SM L18.8CM OPN JAW HAND/FOOT SWCH FORCETRIAD

## (undated) DEVICE — DRESSING FOAM DISK DIA1IN H 7MM HYDRPHLC CHG IMPREG IN SL

## (undated) DEVICE — SOLUTION IV 100ML 0.9% SOD CHL PLAS CONT USP VIAFLX 1 PER

## (undated) DEVICE — DRAIN SURG 19FR 100% SIL RADPQ RND CHN FULL FLUT

## (undated) DEVICE — SUTURE VICRYL + SZ 3-0 L27IN ABSRB UD L26MM SH 1/2 CIR VCP416H

## (undated) DEVICE — GOWN,SIRUS,FABRNF,XL,20/CS: Brand: MEDLINE

## (undated) DEVICE — DECANTER BAG 9": Brand: MEDLINE INDUSTRIES, INC.

## (undated) DEVICE — RESERVOIR,SUCTION,100CC,SILICONE: Brand: MEDLINE

## (undated) DEVICE — GOWN,AURORA,NON-REINFORCED,2XL: Brand: MEDLINE

## (undated) DEVICE — SUTURE MONOCRYL SZ 3-0 L27IN ABSRB UD L24MM PS-1 3/8 CIR PRIM Y936H